# Patient Record
Sex: FEMALE | Race: BLACK OR AFRICAN AMERICAN | Employment: OTHER | URBAN - METROPOLITAN AREA
[De-identification: names, ages, dates, MRNs, and addresses within clinical notes are randomized per-mention and may not be internally consistent; named-entity substitution may affect disease eponyms.]

---

## 2017-07-27 PROBLEM — E66.01 MORBID OBESITY WITH BMI OF 40.0-44.9, ADULT (HCC): Chronic | Status: ACTIVE | Noted: 2017-07-27

## 2017-07-27 PROBLEM — I25.10 CAD (CORONARY ARTERY DISEASE): Chronic | Status: ACTIVE | Noted: 2017-07-27

## 2017-07-27 PROBLEM — K85.00 IDIOPATHIC ACUTE PANCREATITIS: Status: ACTIVE | Noted: 2017-07-27

## 2017-07-30 PROBLEM — K85.90 ACUTE PANCREATITIS WITHOUT INFECTION OR NECROSIS: Status: ACTIVE | Noted: 2017-07-27

## 2017-08-07 ENCOUNTER — OFFICE VISIT (OUTPATIENT)
Dept: CARDIOLOGY CLINIC | Age: 67
End: 2017-08-07

## 2017-08-07 VITALS
SYSTOLIC BLOOD PRESSURE: 120 MMHG | DIASTOLIC BLOOD PRESSURE: 66 MMHG | HEART RATE: 76 BPM | BODY MASS INDEX: 43.68 KG/M2 | WEIGHT: 231.2 LBS

## 2017-08-07 DIAGNOSIS — I25.83 CORONARY ARTERY DISEASE DUE TO LIPID RICH PLAQUE: Primary | ICD-10-CM

## 2017-08-07 DIAGNOSIS — I25.10 CORONARY ARTERY DISEASE DUE TO LIPID RICH PLAQUE: Primary | ICD-10-CM

## 2017-08-07 PROCEDURE — 99214 OFFICE O/P EST MOD 30 MIN: CPT | Performed by: INTERNAL MEDICINE

## 2017-08-07 RX ORDER — POTASSIUM CHLORIDE 750 MG/1
10 CAPSULE, EXTENDED RELEASE ORAL 2 TIMES DAILY
COMMUNITY
End: 2018-10-02

## 2017-08-07 RX ORDER — FUROSEMIDE 40 MG/1
20 TABLET ORAL DAILY
COMMUNITY
End: 2018-10-02

## 2017-11-13 ENCOUNTER — OFFICE VISIT (OUTPATIENT)
Dept: CARDIOLOGY CLINIC | Age: 67
End: 2017-11-13

## 2017-11-13 VITALS
SYSTOLIC BLOOD PRESSURE: 140 MMHG | WEIGHT: 228.6 LBS | HEART RATE: 64 BPM | DIASTOLIC BLOOD PRESSURE: 76 MMHG | BODY MASS INDEX: 43.19 KG/M2

## 2017-11-13 DIAGNOSIS — E78.00 PURE HYPERCHOLESTEROLEMIA: ICD-10-CM

## 2017-11-13 DIAGNOSIS — I10 ESSENTIAL HYPERTENSION: ICD-10-CM

## 2017-11-13 DIAGNOSIS — Z98.61 CORONARY ANGIOPLASTY STATUS: Primary | ICD-10-CM

## 2017-11-13 DIAGNOSIS — Z98.61 CORONARY ANGIOPLASTY STATUS: ICD-10-CM

## 2017-11-13 LAB
A/G RATIO: 1.2 (ref 1.1–2.2)
ALBUMIN SERPL-MCNC: 4.1 G/DL (ref 3.4–5)
ALP BLD-CCNC: 76 U/L (ref 40–129)
ALT SERPL-CCNC: 22 U/L (ref 10–40)
ANION GAP SERPL CALCULATED.3IONS-SCNC: 13 MMOL/L (ref 3–16)
AST SERPL-CCNC: 23 U/L (ref 15–37)
BILIRUB SERPL-MCNC: <0.2 MG/DL (ref 0–1)
BUN BLDV-MCNC: 13 MG/DL (ref 7–20)
CALCIUM SERPL-MCNC: 10.3 MG/DL (ref 8.3–10.6)
CHLORIDE BLD-SCNC: 103 MMOL/L (ref 99–110)
CHOLESTEROL, TOTAL: 266 MG/DL (ref 0–199)
CO2: 27 MMOL/L (ref 21–32)
CREAT SERPL-MCNC: 0.8 MG/DL (ref 0.6–1.2)
GFR AFRICAN AMERICAN: >60
GFR NON-AFRICAN AMERICAN: >60
GLOBULIN: 3.4 G/DL
GLUCOSE BLD-MCNC: 109 MG/DL (ref 70–99)
HDLC SERPL-MCNC: 40 MG/DL (ref 40–60)
LDL CHOLESTEROL CALCULATED: 192 MG/DL
POTASSIUM SERPL-SCNC: 4.6 MMOL/L (ref 3.5–5.1)
SODIUM BLD-SCNC: 143 MMOL/L (ref 136–145)
TOTAL PROTEIN: 7.5 G/DL (ref 6.4–8.2)
TRIGL SERPL-MCNC: 168 MG/DL (ref 0–150)
VLDLC SERPL CALC-MCNC: 34 MG/DL

## 2017-11-13 PROCEDURE — 99214 OFFICE O/P EST MOD 30 MIN: CPT | Performed by: INTERNAL MEDICINE

## 2017-11-13 NOTE — PROGRESS NOTES
[facility]   Dr Kar Mena. Evy Villarreal MD, [address] [facility]    Outpatient Follow Up Note    2017,9:51 AM  Subjective:   CHIEF COMPLAINT / HPI:  Follow Up secondary to hypertension and coronary artery disease     Luiza Ramirez is 79 y.o. female who presents today for a follow-up  This patient is doing well feeling well not having any discomfort or problems. She is planning a trip to [place] in 2018. Cecy aRmirez She feels that she is in pretty good shape and she is to go to rehab. In 2016 she has stent to the right coronary artery has done well since that time unfortunately she did have pancreatitis in 2017 in all for statins were discontinued. She apparently has significant issues with side effects from all the statins over the years including Zetia and is now not taking anything. I do believe that for cardiac purposes she needs to be on something for her cholesterol. We will get a fasting in lipid and CMP today. Think in view of her intolerance of all the statins and other medications we will need to place her on the injectable praluent or repatha. I did have a discussion with her about that.    Pancreatitis treated at [address] [place] [address] 2017  CAD with stent to the right coronary artery 2016  Failed treatment with all statins and all other cholesterol medications  Past Medical History:    Past Medical History:   Diagnosis Date    CAD (coronary artery disease)     CAD (coronary artery disease) 10/20/2016    Distal RCA 3.0 x 15 mm Alpine CRISTEL    Essential hypertension      Past Surgical History  Past Surgical History:   Procedure Laterality Date    ANGIOPLASTY      GS    APPENDECTOMY      BREAST BIOPSY       SECTION      CHOLECYSTECTOMY      HERNIA REPAIR       Social History:       History   Smoking Status    Never Smoker   Smokeless Tobacco    Never Used     Current Medications:  Prior to Visit Medications    Medication Sig Taking? Authorizing Provider   potassium chloride (MICRO-K) 10 MEQ extended release capsule Take 10 mEq by mouth 2 times daily Yes Historical Provider, MD   furosemide (LASIX) 40 MG tablet Take 20 mg by mouth daily Yes Historical Provider, MD   ondansetron (ZOFRAN) 4 MG tablet Take 1 tablet by mouth daily as needed for Nausea or Vomiting Yes Marilin Hoyos MD   hydrALAZINE (APRESOLINE) 50 MG tablet Take 1 tablet by mouth every 8 hours Yes Marilin Hoyos MD   metoprolol tartrate (LOPRESSOR) 25 MG tablet Take 1 tablet by mouth 2 times daily Yes Marilin Hoyos MD   amLODIPine (NORVASC) 5 MG tablet Take 1 tablet by mouth daily Yes Marilin Hoyos MD   ticagrelor (BRILINTA) 90 MG TABS tablet Take 1 tablet by mouth 2 times daily Yes Miky Orourke MD   pravastatin (PRAVACHOL) 20 MG tablet Take 1 tablet by mouth daily Yes Miky Orourke MD   allopurinol (ZYLOPRIM) 100 MG tablet Take 100 mg by mouth Yes Historical Provider, MD   ezetimibe (ZETIA) 10 MG tablet Take by mouth Yes Historical Provider, MD   glipiZIDE (GLUCOTROL) 5 MG tablet Take 1 tablet by mouth Yes Historical Provider, MD   sertraline (ZOLOFT) 50 MG tablet TAKE ONE AND ONE-HALF TABLETS DAILY Yes Historical Provider, MD   vitamin D (ERGOCALCIFEROL) 84623 UNITS CAPS capsule Take 1 capsule by mouth Yes Historical Provider, MD   aspirin EC 81 MG EC tablet Take 1 tablet by mouth daily Yes Anastasiya Kemp NP     Family History  History reviewed. No pertinent family history.     Current Medications  Current Outpatient Prescriptions   Medication Sig Dispense Refill    potassium chloride (MICRO-K) 10 MEQ extended release capsule Take 10 mEq by mouth 2 times daily      furosemide (LASIX) 40 MG tablet Take 20 mg by mouth daily      ondansetron (ZOFRAN) 4 MG tablet Take 1 tablet by mouth daily as needed for Nausea or Vomiting 10 tablet 0    hydrALAZINE (APRESOLINE) 50 MG tablet Take 1 tablet by mouth every 8 hours 90 tablet 3    metoprolol tartrate (LOPRESSOR) 25 MG tablet Take 1 tablet by mouth 2 times daily 60 tablet 3    amLODIPine (NORVASC) 5 MG tablet Take 1 tablet by mouth daily 30 tablet 3    ticagrelor (BRILINTA) 90 MG TABS tablet Take 1 tablet by mouth 2 times daily 180 tablet 2    pravastatin (PRAVACHOL) 20 MG tablet Take 1 tablet by mouth daily 90 tablet 2    allopurinol (ZYLOPRIM) 100 MG tablet Take 100 mg by mouth      ezetimibe (ZETIA) 10 MG tablet Take by mouth      glipiZIDE (GLUCOTROL) 5 MG tablet Take 1 tablet by mouth      sertraline (ZOLOFT) 50 MG tablet TAKE ONE AND ONE-HALF TABLETS DAILY      vitamin D (ERGOCALCIFEROL) 19933 UNITS CAPS capsule Take 1 capsule by mouth      aspirin EC 81 MG EC tablet Take 1 tablet by mouth daily 30 tablet 3     No current facility-administered medications for this visit. REVIEW OF SYSTEMS:    CONSTITUTIONAL: No major weight gain or loss, fatigue, weakness, night sweats or fever. HEENT: No new vision difficulties or ringing in the ears. RESPIRATORY: No new SOB, PND, orthopnea or cough. CARDIOVASCULAR: See HPI  GI: No nausea, vomiting, diarrhea, constipation, abdominal pain or changes in bowel habits. : No urinary frequency, urgency, incontinence hematuria or dysuria. SKIN: No cyanosis or skin lesions. MUSCULOSKELETAL: No new muscle or joint pain. NEUROLOGICAL: No syncope or TIA-like symptoms.   PSYCHIATRIC: No anxiety, pain, insomnia or depression    Objective:   PHYSICAL EXAM:        VITALS:    Wt Readings from Last 3 Encounters:   11/13/17 228 lb 9.6 oz (103.7 kg)   08/07/17 231 lb 3.2 oz (104.9 kg)   07/27/17 231 lb 4.2 oz (104.9 kg)     BP Readings from Last 3 Encounters:   11/13/17 (!) 140/76   08/07/17 120/66   07/31/17 (!) 150/78     Pulse Readings from Last 3 Encounters:   11/13/17 64   08/07/17 76   07/31/17 100       CONSTITUTIONAL: Cooperative, no apparent distress, and appears well nourished / developed  NEUROLOGIC:  Awake and orientated to person, place and time.  PSYCH: Calm affect. SKIN: Warm and dry. HEENT: Sclera non-icteric, normocephalic, neck supple, no elevation of JVP, normal carotid pulses with no bruits and thyroid normal size. LUNGS:  No increased work of breathing and clear to auscultation, no crackles or wheezing  CARDIOVASCULAR:  Regular rate and rhythm with no murmurs, gallops, rubs, or abnormal heart sounds, normal PMI. The apical impulses not displaced  Heart tones are crisp and normal  Cervical veins are not engorged  The carotid upstroke is normal in amplitude and contour without delay or bruit  JVP is not elevated  ABDOMEN:  Normal bowel sounds, non-distended and non-tender to palpation  EXT: No edema, no calf tenderness. Pulses are present bilaterally. DATA:    Lab Results   Component Value Date    ALT 18 07/30/2017    AST 24 07/30/2017    ALKPHOS 38 (L) 07/30/2017    BILITOT 0.6 07/30/2017     Lab Results   Component Value Date    CREATININE 0.8 07/30/2017    BUN 11 07/30/2017     07/30/2017    K 3.5 07/30/2017     07/30/2017    CO2 22 07/30/2017     No results found for: TSH, J2IGSEP, J9CMZBS, THYROIDAB  Lab Results   Component Value Date    WBC 11.4 (H) 07/30/2017    HGB 9.9 (L) 07/30/2017    HCT 31.2 (L) 07/30/2017    MCV 94.5 07/30/2017     07/30/2017     No components found for: CHLPL  Lab Results   Component Value Date    TRIG 129 07/27/2017    TRIG 253 (H) 10/20/2016     Lab Results   Component Value Date    HDL 35 (L) 10/20/2016     Lab Results   Component Value Date    LDLCALC 135 (H) 10/20/2016     Lab Results   Component Value Date    LABVLDL 51 10/20/2016     Radiology Review:  Pertinent images / reports were reviewed as a part of this visit and reveals the following:    Last Echo:    Last Stress Test / Angiogram: 10/4/16  The left ventricular chamber size is normal.  Normal left   ventricular wall motion on gated SPECT images.    The computer estimated rest left ventricular ejection fraction is   68% which is

## 2018-01-04 ENCOUNTER — TELEPHONE (OUTPATIENT)
Dept: CARDIOLOGY CLINIC | Age: 68
End: 2018-01-04

## 2018-01-04 NOTE — TELEPHONE ENCOUNTER
Insurance change beginning of the 2018 year. Pt's REPATHA medications need to go to:    [facility]   #[phone]. [facility] stated they have the authorization from us but not the RX for the [address]. Please call [facility] and also call pt with status.

## 2018-03-08 ENCOUNTER — OFFICE VISIT (OUTPATIENT)
Dept: CARDIOLOGY CLINIC | Age: 68
End: 2018-03-08

## 2018-03-08 VITALS
SYSTOLIC BLOOD PRESSURE: 118 MMHG | BODY MASS INDEX: 42.7 KG/M2 | HEART RATE: 72 BPM | WEIGHT: 226 LBS | DIASTOLIC BLOOD PRESSURE: 66 MMHG

## 2018-03-08 DIAGNOSIS — I25.10 CORONARY ARTERY DISEASE DUE TO LIPID RICH PLAQUE: ICD-10-CM

## 2018-03-08 DIAGNOSIS — E78.5 HYPERLIPIDEMIA LDL GOAL <70: ICD-10-CM

## 2018-03-08 DIAGNOSIS — Z98.61 CORONARY ANGIOPLASTY STATUS: ICD-10-CM

## 2018-03-08 DIAGNOSIS — I25.83 CORONARY ARTERY DISEASE DUE TO LIPID RICH PLAQUE: ICD-10-CM

## 2018-03-08 DIAGNOSIS — I10 ESSENTIAL HYPERTENSION: Primary | ICD-10-CM

## 2018-03-08 PROCEDURE — 99214 OFFICE O/P EST MOD 30 MIN: CPT | Performed by: INTERNAL MEDICINE

## 2018-03-08 NOTE — PROGRESS NOTES
[employer]   Dr Frances Sport. Jake Will MD, [address] [facility]    Outpatient Follow Up Note    3/8/2018,2:47 PM  Subjective:   [address] [facility]:  Follow Up secondary to hypertension and coronary artery disease     Chloe Nicholas is 79 y.o. female who presents today for a follow-up  She is stable from our perspective. Not having any current issues or shortness of breath or dyspnea. She did have her trip to [place] and apparently had a good time last fall. Currently she is on Repatha for about a month and a half. Her vitals are stable. She is tolerating Repatha well. She will have lipids and CMP in 1 month and return to see me in 2 months. [place] [facility] [place]  CAD with stent to the right coronary artery 2016  Failed treatment with all statins and all other cholesterol medications  Past Medical History:    Past Medical History:   Diagnosis Date    CAD (coronary artery disease)     CAD (coronary artery disease) 10/20/2016    Distal RCA 3.0 x 15 mm Alpine CRISTEL    Essential hypertension      Past Surgical History  Past Surgical History:   Procedure Laterality Date    ANGIOPLASTY      GS    APPENDECTOMY      BREAST BIOPSY       SECTION      CHOLECYSTECTOMY      HERNIA REPAIR       Social History:       History   Smoking Status    Never Smoker   Smokeless Tobacco    Never Used     Current Medications:  Prior to Visit Medications    Medication Sig Taking?  Authorizing Provider   Evolocumab with Infusor ([facility]) 420 MG/3.5ML SOCT Inject 420 mg into the skin every 30 days Yes Javier Ayers MD   potassium chloride (MICRO-K) 10 MEQ extended release capsule Take 10 mEq by mouth 2 times daily Yes Historical Provider, MD   furosemide (LASIX) 40 MG tablet Take 20 mg by mouth daily Yes Historical Provider, MD   ondansetron (ZOFRAN) 4 MG tablet Take 1 tablet by mouth daily as needed for Nausea or Vomiting Yes Bj Hahn MD   hydrALAZINE (APRESOLINE) 50 MG tablet Take 1 tablet by mouth every 8 hours Yes Bj Hahn MD   metoprolol tartrate (LOPRESSOR) 25 MG tablet Take 1 tablet by mouth 2 times daily Yes Bj Hahn MD   amLODIPine (NORVASC) 5 MG tablet Take 1 tablet by mouth daily Yes Bj Hahn MD   ticagrelor (BRILINTA) 90 MG TABS tablet Take 1 tablet by mouth 2 times daily Yes Carmel Browning MD   allopurinol (ZYLOPRIM) 100 MG tablet Take 100 mg by mouth Yes Historical Provider, MD   ezetimibe (ZETIA) 10 MG tablet Take by mouth Yes Historical Provider, MD   glipiZIDE (GLUCOTROL) 5 MG tablet Take 1 tablet by mouth Yes Historical Provider, MD   sertraline (ZOLOFT) 50 MG tablet TAKE ONE AND ONE-HALF TABLETS DAILY Yes Historical Provider, MD   vitamin D (ERGOCALCIFEROL) 10058 UNITS CAPS capsule Take 1 capsule by mouth Yes Historical Provider, MD   aspirin EC 81 MG EC tablet Take 1 tablet by mouth daily Yes Carley Angela NP     Family History  History reviewed. No pertinent family history.     Current Medications  Current Outpatient Prescriptions   Medication Sig Dispense Refill    Evolocumab with Infusor ([facility]) 420 MG/3.5ML SOCT Inject 420 mg into the skin every 30 days 1 Cartridge 12    potassium chloride (MICRO-K) 10 MEQ extended release capsule Take 10 mEq by mouth 2 times daily      furosemide (LASIX) 40 MG tablet Take 20 mg by mouth daily      ondansetron (ZOFRAN) 4 MG tablet Take 1 tablet by mouth daily as needed for Nausea or Vomiting 10 tablet 0    hydrALAZINE (APRESOLINE) 50 MG tablet Take 1 tablet by mouth every 8 hours 90 tablet 3    metoprolol tartrate (LOPRESSOR) 25 MG tablet Take 1 tablet by mouth 2 times daily 60 tablet 3    amLODIPine (NORVASC) 5 MG tablet Take 1 tablet by mouth daily 30 tablet 3    ticagrelor (BRILINTA) 90 MG TABS tablet Take 1 tablet by mouth 2 times daily 180 tablet 2    allopurinol (ZYLOPRIM) 100 MG tablet Take 100 mg by mouth  ezetimibe (ZETIA) 10 MG tablet Take by mouth      glipiZIDE (GLUCOTROL) 5 MG tablet Take 1 tablet by mouth      sertraline (ZOLOFT) 50 MG tablet TAKE ONE AND ONE-HALF TABLETS DAILY      vitamin D (ERGOCALCIFEROL) 01110 UNITS CAPS capsule Take 1 capsule by mouth      aspirin EC 81 MG EC tablet Take 1 tablet by mouth daily 30 tablet 3     No current facility-administered medications for this visit. REVIEW OF SYSTEMS:    CONSTITUTIONAL: No major weight gain or loss, fatigue, weakness, night sweats or fever. HEENT: No new vision difficulties or ringing in the ears. RESPIRATORY: No new SOB, PND, orthopnea or cough. CARDIOVASCULAR: See HPI  GI: No nausea, vomiting, diarrhea, constipation, abdominal pain or changes in bowel habits. : No urinary frequency, urgency, incontinence hematuria or dysuria. SKIN: No cyanosis or skin lesions. MUSCULOSKELETAL: No new muscle or joint pain. NEUROLOGICAL: No syncope or TIA-like symptoms. PSYCHIATRIC: No anxiety, pain, insomnia or depression    Objective:   PHYSICAL EXAM:        VITALS:    Wt Readings from Last 3 Encounters:   03/08/18 226 lb (102.5 kg)   11/13/17 228 lb 9.6 oz (103.7 kg)   08/07/17 231 lb 3.2 oz (104.9 kg)     BP Readings from Last 3 Encounters:   03/08/18 118/66   11/13/17 (!) 140/76   08/07/17 120/66     Pulse Readings from Last 3 Encounters:   03/08/18 72   11/13/17 64   08/07/17 76       CONSTITUTIONAL: Cooperative, no apparent distress, and appears well nourished / developed  NEUROLOGIC:  Awake and orientated to person, place and time. PSYCH: Calm affect. SKIN: Warm and dry. HEENT: Sclera non-icteric, normocephalic, neck supple, no elevation of JVP, normal carotid pulses with no bruits and thyroid normal size. LUNGS:  No increased work of breathing and clear to auscultation, no crackles or wheezing  CARDIOVASCULAR:  Regular rate and rhythm with no murmurs, gallops, rubs, or abnormal heart sounds, normal PMI. The apical impulses not

## 2018-03-28 DIAGNOSIS — E78.5 HYPERLIPIDEMIA LDL GOAL <70: ICD-10-CM

## 2018-03-28 DIAGNOSIS — I10 ESSENTIAL HYPERTENSION: ICD-10-CM

## 2018-03-28 DIAGNOSIS — Z98.61 CORONARY ANGIOPLASTY STATUS: ICD-10-CM

## 2018-03-28 DIAGNOSIS — I25.10 CORONARY ARTERY DISEASE DUE TO LIPID RICH PLAQUE: ICD-10-CM

## 2018-03-28 DIAGNOSIS — I25.83 CORONARY ARTERY DISEASE DUE TO LIPID RICH PLAQUE: ICD-10-CM

## 2018-03-28 LAB
A/G RATIO: 1.2 (ref 1.1–2.2)
ALBUMIN SERPL-MCNC: 3.9 G/DL (ref 3.4–5)
ALP BLD-CCNC: 77 U/L (ref 40–129)
ALT SERPL-CCNC: 24 U/L (ref 10–40)
ANION GAP SERPL CALCULATED.3IONS-SCNC: 15 MMOL/L (ref 3–16)
AST SERPL-CCNC: 25 U/L (ref 15–37)
BILIRUB SERPL-MCNC: 0.3 MG/DL (ref 0–1)
BUN BLDV-MCNC: 17 MG/DL (ref 7–20)
CALCIUM SERPL-MCNC: 9.5 MG/DL (ref 8.3–10.6)
CHLORIDE BLD-SCNC: 101 MMOL/L (ref 99–110)
CHOLESTEROL, TOTAL: 144 MG/DL (ref 0–199)
CO2: 26 MMOL/L (ref 21–32)
CREAT SERPL-MCNC: 0.8 MG/DL (ref 0.6–1.2)
GFR AFRICAN AMERICAN: >60
GFR NON-AFRICAN AMERICAN: >60
GLOBULIN: 3.2 G/DL
GLUCOSE BLD-MCNC: 91 MG/DL (ref 70–99)
HDLC SERPL-MCNC: 41 MG/DL (ref 40–60)
LDL CHOLESTEROL CALCULATED: 67 MG/DL
POTASSIUM SERPL-SCNC: 4.1 MMOL/L (ref 3.5–5.1)
SODIUM BLD-SCNC: 142 MMOL/L (ref 136–145)
TOTAL PROTEIN: 7.1 G/DL (ref 6.4–8.2)
TRIGL SERPL-MCNC: 180 MG/DL (ref 0–150)
VLDLC SERPL CALC-MCNC: 36 MG/DL

## 2018-04-06 LAB
AVERAGE GLUCOSE: NORMAL
HBA1C MFR BLD: 6.2 %

## 2018-04-12 ENCOUNTER — OFFICE VISIT (OUTPATIENT)
Dept: CARDIOLOGY CLINIC | Age: 68
End: 2018-04-12

## 2018-04-12 VITALS
SYSTOLIC BLOOD PRESSURE: 120 MMHG | DIASTOLIC BLOOD PRESSURE: 62 MMHG | WEIGHT: 226 LBS | HEART RATE: 68 BPM | BODY MASS INDEX: 42.7 KG/M2

## 2018-04-12 DIAGNOSIS — Z98.890 S/P CORONARY ANGIOGRAM: ICD-10-CM

## 2018-04-12 DIAGNOSIS — I10 ESSENTIAL HYPERTENSION: ICD-10-CM

## 2018-04-12 DIAGNOSIS — E78.00 PURE HYPERCHOLESTEROLEMIA: ICD-10-CM

## 2018-04-12 DIAGNOSIS — Z98.61 CORONARY ANGIOPLASTY STATUS: Primary | ICD-10-CM

## 2018-04-12 DIAGNOSIS — E11.9 TYPE 2 DIABETES MELLITUS WITHOUT COMPLICATION, UNSPECIFIED LONG TERM INSULIN USE STATUS: ICD-10-CM

## 2018-04-12 PROCEDURE — 99214 OFFICE O/P EST MOD 30 MIN: CPT | Performed by: NURSE PRACTITIONER

## 2018-04-13 LAB
CREATININE URINE: NORMAL MG/DL
MICROALBUMIN/CREAT 24H UR: 1000.8 MG/G{CREAT}

## 2018-08-09 ENCOUNTER — OFFICE VISIT (OUTPATIENT)
Dept: CARDIOLOGY CLINIC | Age: 68
End: 2018-08-09

## 2018-08-09 VITALS
DIASTOLIC BLOOD PRESSURE: 64 MMHG | BODY MASS INDEX: 44.02 KG/M2 | HEART RATE: 64 BPM | SYSTOLIC BLOOD PRESSURE: 124 MMHG | WEIGHT: 233 LBS

## 2018-08-09 DIAGNOSIS — E66.01 MORBID OBESITY WITH BMI OF 40.0-44.9, ADULT (HCC): Chronic | ICD-10-CM

## 2018-08-09 DIAGNOSIS — Z98.890 S/P CORONARY ANGIOGRAM: ICD-10-CM

## 2018-08-09 DIAGNOSIS — E11.9 TYPE 2 DIABETES MELLITUS WITHOUT COMPLICATION, UNSPECIFIED WHETHER LONG TERM INSULIN USE (HCC): Primary | ICD-10-CM

## 2018-08-09 DIAGNOSIS — I25.10 CORONARY ARTERY DISEASE INVOLVING NATIVE HEART WITHOUT ANGINA PECTORIS, UNSPECIFIED VESSEL OR LESION TYPE: Chronic | ICD-10-CM

## 2018-08-09 PROCEDURE — 99214 OFFICE O/P EST MOD 30 MIN: CPT | Performed by: NURSE PRACTITIONER

## 2018-08-09 NOTE — PATIENT INSTRUCTIONS
Last visit : drug holiday form Repatha /due to itching al over / today she states itching stopped   Blood work LDL was 192> 67 with Joshua Gaffney  / will do lipids and recheck her LDL's off of the Repatha consider Praluent  In future   Brilinta 90mg BID to 60 mg BID / has been>year on Brilnta 90mg BID / CRISTEL 2016   Has muscle cramps /she does take Vit.  D po / start CoQ10 daily    Fu in apporx one month with blood work  [address] [facility]

## 2018-08-09 NOTE — PROGRESS NOTES
1 tablet by mouth 2 times daily Yes Ignacio Avery MD   amLODIPine (NORVASC) 5 MG tablet Take 1 tablet by mouth daily Yes Ignacio Avery MD   ticagrelor (BRILINTA) 90 MG TABS tablet Take 1 tablet by mouth 2 times daily Yes Adrienne Chappell MD   allopurinol (ZYLOPRIM) 100 MG tablet Take 100 mg by mouth Yes Historical Provider, MD   ezetimibe (ZETIA) 10 MG tablet Take by mouth Yes Historical Provider, MD   glipiZIDE (GLUCOTROL) 5 MG tablet Take 1 tablet by mouth Yes Historical Provider, MD   sertraline (ZOLOFT) 50 MG tablet TAKE ONE AND ONE-HALF TABLETS DAILY Yes Historical Provider, MD   vitamin D (ERGOCALCIFEROL) 07149 UNITS CAPS capsule Take 1 capsule by mouth Yes Historical Provider, MD   aspirin EC 81 MG EC tablet Take 1 tablet by mouth daily Yes Pricila Ortiz APRN - CNP     Family History  History reviewed. No pertinent family history. · ROS:   · Cardiovascular: Reviewed in HPI  · Allergic/Immunologic: No nasal congestion or hives. · All other ROS are reviewed and are unremarkable. PHYSICAL EXAM:      Wt Readings from Last 3 Encounters:   08/09/18 233 lb (105.7 kg)   04/12/18 226 lb (102.5 kg)   03/08/18 226 lb (102.5 kg)       BP Readings from Last 3 Encounters:   08/09/18 124/64   04/12/18 120/62   03/08/18 118/66       Pulse Readings from Last 3 Encounters:   08/09/18 64   04/12/18 68   03/08/18 72     Exam   ENT: neg   NEUROLOGIC:  Neg   PSYCH: neg  SKIN: Warm and dry.   LUNGS:  No increased work of breathing and clear to auscultation, no crackles or wheezing  CARDIOVASCULAR: RRR  2/6 systolic murmur   ABDOMEN: soft large   EXT: no noted edema     Assessment:      CAD stent to the right coronary artery October 20, 2016  No c/o cp SOB edema / no PND   On OMT       HLD   Failed treatment with all statins and all other cholesterol medications  Today c/o itching head to toe / she feels its her Repatha / states throat felt tight one evening       c/o right hand soreness  fu with PCP with this issue      BOB   Uses CPAP      Pancreatitis  treated at [facility] July 2017      Plan:   Patient education on touch screen in room   Discussed heart heathy lifestyle including nutrition, exercise & importance of nonsmoking,         Last visit : drug holiday form Repatha /due to itching al over / today she states itching stopped   Blood work LDL was 192> 67 with Asha Santiago  / will do lipids and recheck her LDL's off of the Repatha consider Praluent  In future   Brilinta 90mg BID to 60 mg BID / has been>year on Brilnta 90mg BID / CRISTEL 2016   Has muscle cramps /she does take Vit.  D po / start CoQ10 daily    Fu in apporx one month with blood work  [address]

## 2018-08-13 DIAGNOSIS — E11.9 TYPE 2 DIABETES MELLITUS WITHOUT COMPLICATION, UNSPECIFIED WHETHER LONG TERM INSULIN USE (HCC): ICD-10-CM

## 2018-08-13 DIAGNOSIS — Z98.890 S/P CORONARY ANGIOGRAM: ICD-10-CM

## 2018-08-13 DIAGNOSIS — E66.01 MORBID OBESITY WITH BMI OF 40.0-44.9, ADULT (HCC): Chronic | ICD-10-CM

## 2018-08-13 DIAGNOSIS — I10 ESSENTIAL HYPERTENSION: ICD-10-CM

## 2018-08-13 DIAGNOSIS — E11.9 TYPE 2 DIABETES MELLITUS WITHOUT COMPLICATION (HCC): ICD-10-CM

## 2018-08-13 DIAGNOSIS — E78.00 PURE HYPERCHOLESTEROLEMIA: ICD-10-CM

## 2018-08-13 DIAGNOSIS — Z98.61 CORONARY ANGIOPLASTY STATUS: ICD-10-CM

## 2018-08-13 DIAGNOSIS — I25.10 CORONARY ARTERY DISEASE INVOLVING NATIVE HEART WITHOUT ANGINA PECTORIS, UNSPECIFIED VESSEL OR LESION TYPE: Chronic | ICD-10-CM

## 2018-08-13 LAB
A/G RATIO: 1.1 (ref 1.1–2.2)
ALBUMIN SERPL-MCNC: 4 G/DL (ref 3.4–5)
ALP BLD-CCNC: 80 U/L (ref 40–129)
ALT SERPL-CCNC: 26 U/L (ref 10–40)
ANION GAP SERPL CALCULATED.3IONS-SCNC: 13 MMOL/L (ref 3–16)
AST SERPL-CCNC: 26 U/L (ref 15–37)
BILIRUB SERPL-MCNC: 0.3 MG/DL (ref 0–1)
BILIRUBIN DIRECT: <0.2 MG/DL (ref 0–0.3)
BILIRUBIN, INDIRECT: NORMAL MG/DL (ref 0–1)
BUN BLDV-MCNC: 17 MG/DL (ref 7–20)
CALCIUM SERPL-MCNC: 10.2 MG/DL (ref 8.3–10.6)
CHLORIDE BLD-SCNC: 103 MMOL/L (ref 99–110)
CHOLESTEROL, TOTAL: 268 MG/DL (ref 0–199)
CO2: 28 MMOL/L (ref 21–32)
CREAT SERPL-MCNC: 0.9 MG/DL (ref 0.6–1.2)
FOLATE: 10.03 NG/ML (ref 4.78–24.2)
GFR AFRICAN AMERICAN: >60
GFR NON-AFRICAN AMERICAN: >60
GLOBULIN: 3.5 G/DL
GLUCOSE BLD-MCNC: 107 MG/DL (ref 70–99)
HCT VFR BLD CALC: 38.4 % (ref 36–48)
HDLC SERPL-MCNC: 36 MG/DL (ref 40–60)
HEMOGLOBIN: 12.6 G/DL (ref 12–16)
LDL CHOLESTEROL CALCULATED: 188 MG/DL
MAGNESIUM: 1.4 MG/DL (ref 1.8–2.4)
MCH RBC QN AUTO: 31 PG (ref 26–34)
MCHC RBC AUTO-ENTMCNC: 32.9 G/DL (ref 31–36)
MCV RBC AUTO: 94.1 FL (ref 80–100)
PDW BLD-RTO: 14 % (ref 12.4–15.4)
PLATELET # BLD: 275 K/UL (ref 135–450)
PMV BLD AUTO: 8.3 FL (ref 5–10.5)
POTASSIUM SERPL-SCNC: 4.5 MMOL/L (ref 3.5–5.1)
RBC # BLD: 4.08 M/UL (ref 4–5.2)
SODIUM BLD-SCNC: 144 MMOL/L (ref 136–145)
TOTAL PROTEIN: 7.5 G/DL (ref 6.4–8.2)
TRIGL SERPL-MCNC: 220 MG/DL (ref 0–150)
VITAMIN B-12: 1073 PG/ML (ref 211–911)
VLDLC SERPL CALC-MCNC: 44 MG/DL
WBC # BLD: 6.6 K/UL (ref 4–11)

## 2018-08-15 LAB — VITAMIN D 1,25-DIHYDROXY: 27 PG/ML (ref 19.9–79.3)

## 2018-10-02 ENCOUNTER — OFFICE VISIT (OUTPATIENT)
Dept: CARDIOLOGY CLINIC | Age: 68
End: 2018-10-02
Payer: MEDICARE

## 2018-10-02 VITALS
BODY MASS INDEX: 45.82 KG/M2 | WEIGHT: 233.4 LBS | OXYGEN SATURATION: 92 % | SYSTOLIC BLOOD PRESSURE: 134 MMHG | HEART RATE: 85 BPM | DIASTOLIC BLOOD PRESSURE: 76 MMHG | HEIGHT: 60 IN

## 2018-10-02 DIAGNOSIS — E78.00 PURE HYPERCHOLESTEROLEMIA: ICD-10-CM

## 2018-10-02 DIAGNOSIS — I10 ESSENTIAL HYPERTENSION: Primary | ICD-10-CM

## 2018-10-02 PROCEDURE — 99214 OFFICE O/P EST MOD 30 MIN: CPT | Performed by: INTERNAL MEDICINE

## 2018-10-02 PROCEDURE — 93000 ELECTROCARDIOGRAM COMPLETE: CPT | Performed by: INTERNAL MEDICINE

## 2018-10-02 RX ORDER — MAGNESIUM OXIDE 400 MG/1
400 TABLET ORAL DAILY
Qty: 30 TABLET | Refills: 3 | Status: SHIPPED | OUTPATIENT
Start: 2018-10-02 | End: 2018-12-26 | Stop reason: SDUPTHER

## 2018-10-04 DIAGNOSIS — E78.00 PURE HYPERCHOLESTEROLEMIA: ICD-10-CM

## 2018-10-04 DIAGNOSIS — I10 ESSENTIAL HYPERTENSION: ICD-10-CM

## 2018-10-04 LAB
A/G RATIO: 1.3 (ref 1.1–2.2)
ALBUMIN SERPL-MCNC: 4.2 G/DL (ref 3.4–5)
ALP BLD-CCNC: 83 U/L (ref 40–129)
ALT SERPL-CCNC: 24 U/L (ref 10–40)
ANION GAP SERPL CALCULATED.3IONS-SCNC: 15 MMOL/L (ref 3–16)
AST SERPL-CCNC: 28 U/L (ref 15–37)
BILIRUB SERPL-MCNC: 0.4 MG/DL (ref 0–1)
BUN BLDV-MCNC: 15 MG/DL (ref 7–20)
CALCIUM SERPL-MCNC: 10.4 MG/DL (ref 8.3–10.6)
CHLORIDE BLD-SCNC: 103 MMOL/L (ref 99–110)
CHOLESTEROL, TOTAL: 277 MG/DL (ref 0–199)
CO2: 25 MMOL/L (ref 21–32)
CREAT SERPL-MCNC: 0.9 MG/DL (ref 0.6–1.2)
GFR AFRICAN AMERICAN: >60
GFR NON-AFRICAN AMERICAN: >60
GLOBULIN: 3.2 G/DL
GLUCOSE BLD-MCNC: 95 MG/DL (ref 70–99)
HDLC SERPL-MCNC: 40 MG/DL (ref 40–60)
LDL CHOLESTEROL CALCULATED: 195 MG/DL
POTASSIUM SERPL-SCNC: 4.4 MMOL/L (ref 3.5–5.1)
SODIUM BLD-SCNC: 143 MMOL/L (ref 136–145)
TOTAL PROTEIN: 7.4 G/DL (ref 6.4–8.2)
TRIGL SERPL-MCNC: 208 MG/DL (ref 0–150)
VLDLC SERPL CALC-MCNC: 42 MG/DL

## 2018-10-08 ENCOUNTER — TELEPHONE (OUTPATIENT)
Dept: CARDIOLOGY CLINIC | Age: 68
End: 2018-10-08

## 2018-12-26 RX ORDER — MAGNESIUM OXIDE 400 MG/1
400 TABLET ORAL DAILY
Qty: 30 TABLET | Refills: 3 | Status: SHIPPED | OUTPATIENT
Start: 2018-12-26 | End: 2020-11-18 | Stop reason: CLARIF

## 2019-01-16 ENCOUNTER — OFFICE VISIT (OUTPATIENT)
Dept: CARDIOLOGY CLINIC | Age: 69
End: 2019-01-16
Payer: MEDICARE

## 2019-01-16 VITALS
WEIGHT: 232.2 LBS | HEART RATE: 71 BPM | SYSTOLIC BLOOD PRESSURE: 120 MMHG | DIASTOLIC BLOOD PRESSURE: 80 MMHG | BODY MASS INDEX: 45.35 KG/M2

## 2019-01-16 DIAGNOSIS — E78.00 PURE HYPERCHOLESTEROLEMIA: ICD-10-CM

## 2019-01-16 DIAGNOSIS — Z98.61 CORONARY ANGIOPLASTY STATUS: ICD-10-CM

## 2019-01-16 DIAGNOSIS — E11.9 TYPE 2 DIABETES MELLITUS WITHOUT COMPLICATION, UNSPECIFIED WHETHER LONG TERM INSULIN USE (HCC): ICD-10-CM

## 2019-01-16 DIAGNOSIS — I10 ESSENTIAL HYPERTENSION: ICD-10-CM

## 2019-01-16 DIAGNOSIS — Z98.890 S/P CORONARY ANGIOGRAM: ICD-10-CM

## 2019-01-16 DIAGNOSIS — E66.01 MORBID OBESITY WITH BMI OF 40.0-44.9, ADULT (HCC): Chronic | ICD-10-CM

## 2019-01-16 DIAGNOSIS — Z98.61 CORONARY ANGIOPLASTY STATUS: Primary | ICD-10-CM

## 2019-01-16 LAB
A/G RATIO: 1.3 (ref 1.1–2.2)
ALBUMIN SERPL-MCNC: 4.3 G/DL (ref 3.4–5)
ALP BLD-CCNC: 84 U/L (ref 40–129)
ALT SERPL-CCNC: 25 U/L (ref 10–40)
ANION GAP SERPL CALCULATED.3IONS-SCNC: 17 MMOL/L (ref 3–16)
AST SERPL-CCNC: 25 U/L (ref 15–37)
BILIRUB SERPL-MCNC: 0.3 MG/DL (ref 0–1)
BILIRUBIN DIRECT: <0.2 MG/DL (ref 0–0.3)
BILIRUBIN, INDIRECT: NORMAL MG/DL (ref 0–1)
BUN BLDV-MCNC: 18 MG/DL (ref 7–20)
CALCIUM SERPL-MCNC: 9.9 MG/DL (ref 8.3–10.6)
CHLORIDE BLD-SCNC: 102 MMOL/L (ref 99–110)
CHOLESTEROL, TOTAL: 154 MG/DL (ref 0–199)
CO2: 23 MMOL/L (ref 21–32)
CREAT SERPL-MCNC: 0.8 MG/DL (ref 0.6–1.2)
GFR AFRICAN AMERICAN: >60
GFR NON-AFRICAN AMERICAN: >60
GLOBULIN: 3.4 G/DL
GLUCOSE BLD-MCNC: 100 MG/DL (ref 70–99)
HCT VFR BLD CALC: 38.1 % (ref 36–48)
HDLC SERPL-MCNC: 40 MG/DL (ref 40–60)
HEMOGLOBIN: 12.5 G/DL (ref 12–16)
LDL CHOLESTEROL CALCULATED: 86 MG/DL
MAGNESIUM: 1.5 MG/DL (ref 1.8–2.4)
MCH RBC QN AUTO: 31.1 PG (ref 26–34)
MCHC RBC AUTO-ENTMCNC: 32.9 G/DL (ref 31–36)
MCV RBC AUTO: 94.6 FL (ref 80–100)
PDW BLD-RTO: 14 % (ref 12.4–15.4)
PLATELET # BLD: 260 K/UL (ref 135–450)
PMV BLD AUTO: 8.4 FL (ref 5–10.5)
POTASSIUM SERPL-SCNC: 4.3 MMOL/L (ref 3.5–5.1)
RBC # BLD: 4.03 M/UL (ref 4–5.2)
SODIUM BLD-SCNC: 142 MMOL/L (ref 136–145)
TOTAL PROTEIN: 7.7 G/DL (ref 6.4–8.2)
TRIGL SERPL-MCNC: 138 MG/DL (ref 0–150)
TSH REFLEX FT4: 3.22 UIU/ML (ref 0.27–4.2)
VLDLC SERPL CALC-MCNC: 28 MG/DL
WBC # BLD: 5.7 K/UL (ref 4–11)

## 2019-01-16 PROCEDURE — 99214 OFFICE O/P EST MOD 30 MIN: CPT | Performed by: NURSE PRACTITIONER

## 2019-01-17 ENCOUNTER — NURSE ONLY (OUTPATIENT)
Dept: CARDIOLOGY | Age: 69
End: 2019-01-17

## 2019-01-17 DIAGNOSIS — I25.10 CVD (CARDIOVASCULAR DISEASE): Primary | ICD-10-CM

## 2019-01-19 LAB — VITAMIN D 1,25-DIHYDROXY: 48.2 PG/ML (ref 19.9–79.3)

## 2019-07-17 ENCOUNTER — OFFICE VISIT (OUTPATIENT)
Dept: CARDIOLOGY CLINIC | Age: 69
End: 2019-07-17
Payer: MEDICARE

## 2019-07-17 VITALS
SYSTOLIC BLOOD PRESSURE: 138 MMHG | HEART RATE: 68 BPM | DIASTOLIC BLOOD PRESSURE: 74 MMHG | WEIGHT: 234 LBS | BODY MASS INDEX: 45.7 KG/M2

## 2019-07-17 DIAGNOSIS — E78.00 PURE HYPERCHOLESTEROLEMIA: ICD-10-CM

## 2019-07-17 DIAGNOSIS — Z98.61 CORONARY ANGIOPLASTY STATUS: ICD-10-CM

## 2019-07-17 DIAGNOSIS — Z98.61 CORONARY ANGIOPLASTY STATUS: Primary | ICD-10-CM

## 2019-07-17 LAB
A/G RATIO: 1.3 (ref 1.1–2.2)
ALBUMIN SERPL-MCNC: 4.2 G/DL (ref 3.4–5)
ALP BLD-CCNC: 80 U/L (ref 40–129)
ALT SERPL-CCNC: 34 U/L (ref 10–40)
ANION GAP SERPL CALCULATED.3IONS-SCNC: 12 MMOL/L (ref 3–16)
AST SERPL-CCNC: 21 U/L (ref 15–37)
BILIRUB SERPL-MCNC: <0.2 MG/DL (ref 0–1)
BUN BLDV-MCNC: 27 MG/DL (ref 7–20)
CALCIUM SERPL-MCNC: 10.1 MG/DL (ref 8.3–10.6)
CHLORIDE BLD-SCNC: 105 MMOL/L (ref 99–110)
CHOLESTEROL, TOTAL: 154 MG/DL (ref 0–199)
CO2: 28 MMOL/L (ref 21–32)
CREAT SERPL-MCNC: 1 MG/DL (ref 0.6–1.2)
GFR AFRICAN AMERICAN: >60
GFR NON-AFRICAN AMERICAN: 55
GLOBULIN: 3.3 G/DL
GLUCOSE BLD-MCNC: 98 MG/DL (ref 70–99)
HCT VFR BLD CALC: 40.9 % (ref 36–48)
HDLC SERPL-MCNC: 65 MG/DL (ref 40–60)
HEMOGLOBIN: 13.1 G/DL (ref 12–16)
LDL CHOLESTEROL CALCULATED: 69 MG/DL
MCH RBC QN AUTO: 30.8 PG (ref 26–34)
MCHC RBC AUTO-ENTMCNC: 32.1 G/DL (ref 31–36)
MCV RBC AUTO: 96.2 FL (ref 80–100)
PDW BLD-RTO: 14.4 % (ref 12.4–15.4)
PLATELET # BLD: 328 K/UL (ref 135–450)
PMV BLD AUTO: 8.2 FL (ref 5–10.5)
POTASSIUM SERPL-SCNC: 4.8 MMOL/L (ref 3.5–5.1)
RBC # BLD: 4.25 M/UL (ref 4–5.2)
SODIUM BLD-SCNC: 145 MMOL/L (ref 136–145)
TOTAL PROTEIN: 7.5 G/DL (ref 6.4–8.2)
TRIGL SERPL-MCNC: 102 MG/DL (ref 0–150)
VLDLC SERPL CALC-MCNC: 20 MG/DL
WBC # BLD: 8.7 K/UL (ref 4–11)

## 2019-07-17 PROCEDURE — 99214 OFFICE O/P EST MOD 30 MIN: CPT | Performed by: INTERNAL MEDICINE

## 2019-07-17 NOTE — PROGRESS NOTES
[address]   Dr Devonna Collet. Thea Vazquez MD, [address] [facility]    Outpatient Follow Up Note    2019,10:40 AM  Subjective:   CHIEF COMPLAINT / HPI:  Follow Up secondary to hyperlipidemia     Arlene Avery is 71 y.o. female who presents today for a follow-up on her heart disease. She does have obstructive sleep apnea and uses CPAP and she is doing well with that. She is on Repatha and her LDLs have come down dramatically. She did have a bruise on her left breast for unknown reason. She is concerning whether Repatha may be the cause. She has a slight irritation at the injection site on her thighs. No fever no chills. She has known CAD with previous coronary artery disease. CAD with previous angioplasty stents. Hyperlipidemia on Repatha  Past Medical History:    Past Medical History:   Diagnosis Date    CAD (coronary artery disease)     CAD (coronary artery disease) 10/20/2016    Distal RCA 3.0 x 15 mm Alpine CRISTEL    Essential hypertension      Past Surgical History  Past Surgical History:   Procedure Laterality Date    ANGIOPLASTY      GS    APPENDECTOMY      BREAST BIOPSY       SECTION      CHOLECYSTECTOMY      HERNIA REPAIR       Social History:       Social History     Tobacco Use   Smoking Status Never Smoker   Smokeless Tobacco Never Used     Current Medications:  Prior to Visit Medications    Medication Sig Taking?  Authorizing Provider   Evolocumab 140 MG/ML SOAJ Inject 1 Syringe into the skin every 14 days Yes ALETA Sweeney CNP   magnesium oxide (MAG-OX) 400 MG tablet Take 1 tablet by mouth daily Yes ALETA Sweeney CNP   ticagrelor (BRILINTA) 60 MG TABS tablet Take 1 tablet by mouth 2 times daily Yes ALETA Sweeney CNP   hydrALAZINE (APRESOLINE) 50 MG tablet Take 1 tablet by mouth every 8 hours Yes Dean Hull MD   metoprolol tartrate (LOPRESSOR) 25 MG tablet Take 1 tablet by mouth 2 times daily Yes Dean Hull MD Value Date    WBC 5.7 01/16/2019    HGB 12.5 01/16/2019    HCT 38.1 01/16/2019    MCV 94.6 01/16/2019     01/16/2019     No components found for: CHLPL  Lab Results   Component Value Date    TRIG 138 01/16/2019    TRIG 208 (H) 10/04/2018    TRIG 220 (H) 08/13/2018     Lab Results   Component Value Date    HDL 40 01/16/2019    HDL 40 10/04/2018    HDL 36 (L) 08/13/2018     Lab Results   Component Value Date    LDLCALC 86 01/16/2019    LDLCALC 195 (H) 10/04/2018    LDLCALC 188 (H) 08/13/2018     Lab Results   Component Value Date    LABVLDL 28 01/16/2019    LABVLDL 42 10/04/2018    LABVLDL 44 08/13/2018     Radiology Review:  Pertinent images / reports were reviewed as a part of this visit and reveals the following:    Last Echo:    Last Stress Test / Angiogram:    Last ECG:  This patient was educated using the patient point room wall mount device. Absence from smokers and smoking and diet and exercising are important. Assessment:   CAD and post PTCA stents. Hyperlipidemia on Repatha    Plan: Will get lipids and CMP and CBC to check for coagulability. I do not think Enoch Garcia is the cause for her bruise on the left breast.    Please call if we can assist further [phone]. Devon Martinez.  [place] T[place], [facility]      This note was likely completed using voice recognition technology and may contain unintended errors

## 2019-08-16 RX ORDER — TICAGRELOR 60 MG/1
TABLET ORAL
Qty: 180 TABLET | Refills: 3 | Status: SHIPPED | OUTPATIENT
Start: 2019-08-16 | End: 2020-08-10

## 2019-10-17 RX ORDER — EVOLOCUMAB 140 MG/ML
INJECTION, SOLUTION SUBCUTANEOUS
Qty: 2 PEN | Refills: 5 | Status: SHIPPED | OUTPATIENT
Start: 2019-10-17 | End: 2021-03-11 | Stop reason: SDUPTHER

## 2020-02-14 ENCOUNTER — OFFICE VISIT (OUTPATIENT)
Dept: CARDIOLOGY CLINIC | Age: 70
End: 2020-02-14
Payer: MEDICARE

## 2020-02-14 VITALS
DIASTOLIC BLOOD PRESSURE: 62 MMHG | HEART RATE: 62 BPM | SYSTOLIC BLOOD PRESSURE: 130 MMHG | BODY MASS INDEX: 45.5 KG/M2 | WEIGHT: 233 LBS

## 2020-02-14 LAB
CHOLESTEROL, TOTAL: 152 MG/DL (ref 0–199)
HCT VFR BLD CALC: 41.4 % (ref 36–48)
HDLC SERPL-MCNC: 41 MG/DL (ref 40–60)
HEMOGLOBIN: 13.5 G/DL (ref 12–16)
LDL CHOLESTEROL CALCULATED: 73 MG/DL
MAGNESIUM: 1.7 MG/DL (ref 1.8–2.4)
MCH RBC QN AUTO: 31.1 PG (ref 26–34)
MCHC RBC AUTO-ENTMCNC: 32.5 G/DL (ref 31–36)
MCV RBC AUTO: 95.7 FL (ref 80–100)
PDW BLD-RTO: 13.9 % (ref 12.4–15.4)
PLATELET # BLD: 312 K/UL (ref 135–450)
PMV BLD AUTO: 8.7 FL (ref 5–10.5)
RBC # BLD: 4.33 M/UL (ref 4–5.2)
TRIGL SERPL-MCNC: 192 MG/DL (ref 0–150)
VLDLC SERPL CALC-MCNC: 38 MG/DL
WBC # BLD: 7.8 K/UL (ref 4–11)

## 2020-02-14 PROCEDURE — 99214 OFFICE O/P EST MOD 30 MIN: CPT | Performed by: INTERNAL MEDICINE

## 2020-02-14 NOTE — PROGRESS NOTES
[facility]   Dr Calero Dry. Lane Mueller MD, [address] [facility]    Outpatient Follow Up Note    ,00:33 AM  Subjective:   CHIEF COMPLAINT / HPI:  Follow Up secondary to hyperlipidemia     Marco A Tracy is 71 y.o. female who presents today for follow-up for cardiac disease hypertension. She has hyperlipidemia. She is doing great feels fine and her numbers are looking better. She states that she itches on occasion and she feels that [address] [place] [address] might be the cause. There is no localized area and as I discussed with her it sounds like it could not be from the [address] [place] [address]. From our perspective she otherwise looks stable blood pressure is stable. CAD with previous angioplasty stents. Hyperlipidemia on Repatha  Past Medical History:    Past Medical History:   Diagnosis Date    CAD (coronary artery disease)     CAD (coronary artery disease) 10/20/2016    Distal RCA 3.0 x 15 mm Alpine CRISTEL    Essential hypertension      Past Surgical History  Past Surgical History:   Procedure Laterality Date    ANGIOPLASTY      GS    APPENDECTOMY      BREAST BIOPSY       SECTION      CHOLECYSTECTOMY      HERNIA REPAIR       Social History:       Social History     Tobacco Use   Smoking Status Never Smoker   Smokeless Tobacco Never Used     Current Medications:  Prior to Visit Medications    Medication Sig Taking?  Authorizing Provider   Brian Bender 920 MG/ML SOAJ INJECT CONTENTS OF 1 PEN SUBCUTANEOUSLY EVERY 14 DAYS Yes ALETA Hung CNP   BRILINTA 60 MG TABS tablet TAKE 1 TABLET TWICE A DAY Yes Jacqui Montano MD   magnesium oxide (MAG-OX) 400 MG tablet Take 1 tablet by mouth daily Yes ALETA Hung CNP   hydrALAZINE (APRESOLINE) 50 MG tablet Take 1 tablet by mouth every 8 hours Yes Resa Cockayne, MD   metoprolol tartrate (LOPRESSOR) 25 MG tablet Take 1 tablet by mouth 2 times daily Yes Resa Cockayne, MD   amLODIPine (NORVASC) 5 MG tablet Take 1 tablet by frequency, urgency, incontinence hematuria or dysuria. SKIN: No cyanosis or skin lesions. MUSCULOSKELETAL: No new muscle or joint pain. NEUROLOGICAL: No syncope or TIA-like symptoms. PSYCHIATRIC: No anxiety, pain, insomnia or depression    Objective:   PHYSICAL EXAM:        VITALS:    Wt Readings from Last 3 Encounters:   02/14/20 233 lb (105.7 kg)   07/17/19 234 lb (106.1 kg)   01/16/19 232 lb 3.2 oz (105.3 kg)     BP Readings from Last 3 Encounters:   02/14/20 130/62   07/17/19 138/74   01/16/19 120/80     Pulse Readings from Last 3 Encounters:   02/14/20 62   07/17/19 68   01/16/19 71       CONSTITUTIONAL: Cooperative, no apparent distress, and appears well nourished / developed  NEUROLOGIC:  Awake and orientated to person, place and time. PSYCH: Calm affect. SKIN: Warm and dry. HEENT: Sclera non-icteric, normocephalic, neck supple, no elevation of JVP, normal carotid pulses with no bruits and thyroid normal size. LUNGS:  No increased work of breathing and clear to auscultation, no crackles or wheezing  CARDIOVASCULAR:  Regular rate and rhythm with no murmurs, gallops, rubs, or abnormal heart sounds, normal PMI. The apical impulses not displaced  Heart tones are crisp and normal  Cervical veins are not engorged  The carotid upstroke is normal in amplitude and contour without delay or bruit  JVP is not elevated  ABDOMEN:  Normal bowel sounds, non-distended and non-tender to palpation  EXT: No edema, no calf tenderness. Pulses are present bilaterally.     DATA:    Lab Results   Component Value Date    ALT 34 07/17/2019    AST 21 07/17/2019    ALKPHOS 80 07/17/2019    BILITOT <0.2 07/17/2019     Lab Results   Component Value Date    CREATININE 1.0 07/17/2019    BUN 27 (H) 07/17/2019     07/17/2019    K 4.8 07/17/2019     07/17/2019    CO2 28 07/17/2019     No results found for: TSH, U8QUPPQ, I0HCXRA, THYROIDAB  Lab Results   Component Value Date    WBC 8.7 07/17/2019    HGB 13.1 07/17/2019    HCT

## 2020-02-18 ENCOUNTER — TELEPHONE (OUTPATIENT)
Dept: CARDIOLOGY CLINIC | Age: 70
End: 2020-02-18

## 2020-06-24 ENCOUNTER — OFFICE VISIT (OUTPATIENT)
Dept: CARDIOLOGY CLINIC | Age: 70
End: 2020-06-24
Payer: MEDICARE

## 2020-06-24 VITALS
SYSTOLIC BLOOD PRESSURE: 124 MMHG | HEART RATE: 60 BPM | DIASTOLIC BLOOD PRESSURE: 84 MMHG | BODY MASS INDEX: 45.9 KG/M2 | TEMPERATURE: 97.3 F | WEIGHT: 235 LBS

## 2020-06-24 PROCEDURE — 93000 ELECTROCARDIOGRAM COMPLETE: CPT | Performed by: INTERNAL MEDICINE

## 2020-06-24 PROCEDURE — 99214 OFFICE O/P EST MOD 30 MIN: CPT | Performed by: INTERNAL MEDICINE

## 2020-06-24 NOTE — PROGRESS NOTES
[facility], [place], operated by [facility]   Dr Kassy Smith. Kita Schuster MD, [address] [facility]    Outpatient Follow Up Note    2020,11:42 AM  Subjective:   CHIEF COMPLAINT / HPI:  Follow Up secondary to hyperlipidemia and hypertension and coronary disease     Win Giraldo is 79 y.o. female who presents today she is very stable having no symptoms of shortness of breath or dyspnea. She is actively moving about. She has gained about 2 pounds of [place] fat during this pandemic area. Last LDL was 66 on Repatha. She does have intolerance to statins specifically Lipitor Crestor and pravastatin. Examination is unremarkable except for the weight. CAD with previous angioplasty stents. Hyperlipidemia on Repatha  Intolerant statins lipitor and crestor and pravastatin  Past Medical History:    Past Medical History:   Diagnosis Date    CAD (coronary artery disease)     CAD (coronary artery disease) 10/20/2016    Distal RCA 3.0 x 15 mm Alpine CRISTEL    Essential hypertension      Past Surgical History  Past Surgical History:   Procedure Laterality Date    ANGIOPLASTY      GS    APPENDECTOMY      BREAST BIOPSY       SECTION      CHOLECYSTECTOMY      HERNIA REPAIR       Social History:       Social History     Tobacco Use   Smoking Status Never Smoker   Smokeless Tobacco Never Used     Current Medications:  Prior to Visit Medications    Medication Sig Taking?  Authorizing Provider   Nargis Garsia 455 MG/ML SOAJ INJECT CONTENTS OF 1 PEN SUBCUTANEOUSLY EVERY 14 DAYS Yes ALETA Sparks CNP   BRILINTA 60 MG TABS tablet TAKE 1 TABLET TWICE A DAY Yes Kasey Alaniz MD   magnesium oxide (MAG-OX) 400 MG tablet Take 1 tablet by mouth daily Yes ALETA Sparks CNP   hydrALAZINE (APRESOLINE) 50 MG tablet Take 1 tablet by mouth every 8 hours Yes Maribel Douglas MD   metoprolol tartrate (LOPRESSOR) 25 MG tablet Take 1 tablet by mouth 2 times daily Yes Maribel Douglas MD   amLODIPine ([address]) 5

## 2020-08-10 RX ORDER — TICAGRELOR 60 MG/1
TABLET ORAL
Qty: 180 TABLET | Refills: 1 | Status: SHIPPED | OUTPATIENT
Start: 2020-08-10 | End: 2021-03-03

## 2020-11-18 ENCOUNTER — HOSPITAL ENCOUNTER (INPATIENT)
Age: 70
LOS: 6 days | Discharge: HOME | DRG: 178 | End: 2020-11-24
Attending: EMERGENCY MEDICINE | Admitting: INTERNAL MEDICINE
Payer: MEDICARE

## 2020-11-18 ENCOUNTER — APPOINTMENT (OUTPATIENT)
Dept: GENERAL RADIOLOGY | Age: 70
DRG: 178 | End: 2020-11-18
Payer: MEDICARE

## 2020-11-18 PROBLEM — U07.1 COVID-19: Status: ACTIVE | Noted: 2020-11-18

## 2020-11-18 LAB
AMORPHOUS: ABNORMAL /HPF
ANION GAP SERPL CALCULATED.3IONS-SCNC: 17 MMOL/L (ref 3–16)
BACTERIA: ABNORMAL /HPF
BASOPHILS ABSOLUTE: 0 K/UL (ref 0–0.2)
BASOPHILS RELATIVE PERCENT: 0 %
BILIRUBIN URINE: NEGATIVE
BLOOD, URINE: ABNORMAL
BUN BLDV-MCNC: 58 MG/DL (ref 7–20)
CALCIUM SERPL-MCNC: 9.5 MG/DL (ref 8.3–10.6)
CHLORIDE BLD-SCNC: 103 MMOL/L (ref 99–110)
CHLORIDE URINE RANDOM: <20 MMOL/L
CLARITY: ABNORMAL
CO2: 15 MMOL/L (ref 21–32)
COLOR: YELLOW
CREAT SERPL-MCNC: 4.8 MG/DL (ref 0.6–1.2)
CREATININE URINE: 182.5 MG/DL (ref 28–259)
EKG ATRIAL RATE: 91 BPM
EKG DIAGNOSIS: NORMAL
EKG P AXIS: 34 DEGREES
EKG P-R INTERVAL: 164 MS
EKG Q-T INTERVAL: 362 MS
EKG QRS DURATION: 80 MS
EKG QTC CALCULATION (BAZETT): 445 MS
EKG R AXIS: -8 DEGREES
EKG T AXIS: 9 DEGREES
EKG VENTRICULAR RATE: 91 BPM
EOSINOPHILS ABSOLUTE: 0 K/UL (ref 0–0.6)
EOSINOPHILS RELATIVE PERCENT: 0 %
EPITHELIAL CELLS, UA: ABNORMAL /HPF (ref 0–5)
GFR AFRICAN AMERICAN: 11
GFR NON-AFRICAN AMERICAN: 9
GLUCOSE BLD-MCNC: 113 MG/DL (ref 70–99)
GLUCOSE BLD-MCNC: 73 MG/DL (ref 70–99)
GLUCOSE BLD-MCNC: 84 MG/DL (ref 70–99)
GLUCOSE BLD-MCNC: 94 MG/DL (ref 70–99)
GLUCOSE URINE: NEGATIVE MG/DL
HCT VFR BLD CALC: 38.3 % (ref 36–48)
HEMOGLOBIN: 12.6 G/DL (ref 12–16)
KETONES, URINE: NEGATIVE MG/DL
LEUKOCYTE ESTERASE, URINE: NEGATIVE
LYMPHOCYTES ABSOLUTE: 1.5 K/UL (ref 1–5.1)
LYMPHOCYTES RELATIVE PERCENT: 13 %
MCH RBC QN AUTO: 30.9 PG (ref 26–34)
MCHC RBC AUTO-ENTMCNC: 33 G/DL (ref 31–36)
MCV RBC AUTO: 93.7 FL (ref 80–100)
MICROSCOPIC EXAMINATION: YES
MONOCYTES ABSOLUTE: 1.4 K/UL (ref 0–1.3)
MONOCYTES RELATIVE PERCENT: 12 %
NEUTROPHILS ABSOLUTE: 8.5 K/UL (ref 1.7–7.7)
NEUTROPHILS RELATIVE PERCENT: 75 %
NITRITE, URINE: NEGATIVE
PDW BLD-RTO: 14.7 % (ref 12.4–15.4)
PERFORMED ON: ABNORMAL
PERFORMED ON: NORMAL
PERFORMED ON: NORMAL
PH UA: 6 (ref 5–8)
PLATELET # BLD: 379 K/UL (ref 135–450)
PMV BLD AUTO: 7.9 FL (ref 5–10.5)
POTASSIUM REFLEX MAGNESIUM: 4.9 MMOL/L (ref 3.5–5.1)
POTASSIUM, UR: 17.2 MMOL/L
PRO-BNP: 222 PG/ML (ref 0–124)
PROTEIN PROTEIN: 173.7 MG/DL
PROTEIN UA: 100 MG/DL
PROTEIN/CREAT RATIO: 1 MG/DL
RBC # BLD: 4.09 M/UL (ref 4–5.2)
RBC UA: ABNORMAL /HPF (ref 0–4)
SODIUM BLD-SCNC: 135 MMOL/L (ref 136–145)
SODIUM URINE: 35 MMOL/L
SPECIFIC GRAVITY UA: 1.02 (ref 1–1.03)
TROPONIN: 0.05 NG/ML
TROPONIN: 0.06 NG/ML
TROPONIN: 0.06 NG/ML
URIC ACID, SERUM: 7.4 MG/DL (ref 2.6–6)
URINE TYPE: ABNORMAL
UROBILINOGEN, URINE: 0.2 E.U./DL
WBC # BLD: 11.3 K/UL (ref 4–11)
WBC UA: ABNORMAL /HPF (ref 0–5)

## 2020-11-18 PROCEDURE — 84300 ASSAY OF URINE SODIUM: CPT

## 2020-11-18 PROCEDURE — 6370000000 HC RX 637 (ALT 250 FOR IP): Performed by: STUDENT IN AN ORGANIZED HEALTH CARE EDUCATION/TRAINING PROGRAM

## 2020-11-18 PROCEDURE — 80048 BASIC METABOLIC PNL TOTAL CA: CPT

## 2020-11-18 PROCEDURE — 84133 ASSAY OF URINE POTASSIUM: CPT

## 2020-11-18 PROCEDURE — 2060000000 HC ICU INTERMEDIATE R&B

## 2020-11-18 PROCEDURE — 83036 HEMOGLOBIN GLYCOSYLATED A1C: CPT

## 2020-11-18 PROCEDURE — 6370000000 HC RX 637 (ALT 250 FOR IP): Performed by: INTERNAL MEDICINE

## 2020-11-18 PROCEDURE — 99284 EMERGENCY DEPT VISIT MOD MDM: CPT

## 2020-11-18 PROCEDURE — 82436 ASSAY OF URINE CHLORIDE: CPT

## 2020-11-18 PROCEDURE — 93005 ELECTROCARDIOGRAM TRACING: CPT | Performed by: EMERGENCY MEDICINE

## 2020-11-18 PROCEDURE — 2580000003 HC RX 258: Performed by: INTERNAL MEDICINE

## 2020-11-18 PROCEDURE — 2500000003 HC RX 250 WO HCPCS: Performed by: INTERNAL MEDICINE

## 2020-11-18 PROCEDURE — 81001 URINALYSIS AUTO W/SCOPE: CPT

## 2020-11-18 PROCEDURE — 71045 X-RAY EXAM CHEST 1 VIEW: CPT

## 2020-11-18 PROCEDURE — 6360000002 HC RX W HCPCS: Performed by: INTERNAL MEDICINE

## 2020-11-18 PROCEDURE — 36415 COLL VENOUS BLD VENIPUNCTURE: CPT

## 2020-11-18 PROCEDURE — 85025 COMPLETE CBC W/AUTO DIFF WBC: CPT

## 2020-11-18 PROCEDURE — 84156 ASSAY OF PROTEIN URINE: CPT

## 2020-11-18 PROCEDURE — 83880 ASSAY OF NATRIURETIC PEPTIDE: CPT

## 2020-11-18 PROCEDURE — 2580000003 HC RX 258: Performed by: EMERGENCY MEDICINE

## 2020-11-18 PROCEDURE — 82570 ASSAY OF URINE CREATININE: CPT

## 2020-11-18 PROCEDURE — 84550 ASSAY OF BLOOD/URIC ACID: CPT

## 2020-11-18 PROCEDURE — 87205 SMEAR GRAM STAIN: CPT

## 2020-11-18 PROCEDURE — 84484 ASSAY OF TROPONIN QUANT: CPT

## 2020-11-18 RX ORDER — INSULIN LISPRO 100 [IU]/ML
0-6 INJECTION, SOLUTION INTRAVENOUS; SUBCUTANEOUS NIGHTLY
Status: DISCONTINUED | OUTPATIENT
Start: 2020-11-18 | End: 2020-11-24 | Stop reason: HOSPADM

## 2020-11-18 RX ORDER — SODIUM CHLORIDE 9 MG/ML
INJECTION, SOLUTION INTRAVENOUS CONTINUOUS
Status: DISCONTINUED | OUTPATIENT
Start: 2020-11-18 | End: 2020-11-18

## 2020-11-18 RX ORDER — ALLOPURINOL 100 MG/1
100 TABLET ORAL DAILY
Status: DISCONTINUED | OUTPATIENT
Start: 2020-11-18 | End: 2020-11-18 | Stop reason: DRUGHIGH

## 2020-11-18 RX ORDER — ONDANSETRON 2 MG/ML
4 INJECTION INTRAMUSCULAR; INTRAVENOUS EVERY 6 HOURS PRN
Status: DISCONTINUED | OUTPATIENT
Start: 2020-11-18 | End: 2020-11-24 | Stop reason: HOSPADM

## 2020-11-18 RX ORDER — HEPARIN SODIUM 5000 [USP'U]/ML
5000 INJECTION, SOLUTION INTRAVENOUS; SUBCUTANEOUS EVERY 8 HOURS SCHEDULED
Status: DISCONTINUED | OUTPATIENT
Start: 2020-11-18 | End: 2020-11-24 | Stop reason: HOSPADM

## 2020-11-18 RX ORDER — ALLOPURINOL 300 MG/1
300 TABLET ORAL DAILY
Status: ON HOLD | COMMUNITY
Start: 2020-10-23 | End: 2020-11-24 | Stop reason: HOSPADM

## 2020-11-18 RX ORDER — SODIUM CHLORIDE 0.9 % (FLUSH) 0.9 %
10 SYRINGE (ML) INJECTION PRN
Status: DISCONTINUED | OUTPATIENT
Start: 2020-11-18 | End: 2020-11-24 | Stop reason: HOSPADM

## 2020-11-18 RX ORDER — MAGNESIUM SULFATE IN WATER 40 MG/ML
2 INJECTION, SOLUTION INTRAVENOUS PRN
Status: DISCONTINUED | OUTPATIENT
Start: 2020-11-18 | End: 2020-11-18

## 2020-11-18 RX ORDER — HYDRALAZINE HYDROCHLORIDE 50 MG/1
50 TABLET, FILM COATED ORAL 2 TIMES DAILY
Status: DISCONTINUED | OUTPATIENT
Start: 2020-11-18 | End: 2020-11-24 | Stop reason: HOSPADM

## 2020-11-18 RX ORDER — PROMETHAZINE HYDROCHLORIDE 25 MG/1
12.5 TABLET ORAL EVERY 6 HOURS PRN
Status: DISCONTINUED | OUTPATIENT
Start: 2020-11-18 | End: 2020-11-24 | Stop reason: HOSPADM

## 2020-11-18 RX ORDER — ALLOPURINOL 100 MG/1
50 TABLET ORAL
Status: DISCONTINUED | OUTPATIENT
Start: 2020-11-23 | End: 2020-11-18

## 2020-11-18 RX ORDER — ASPIRIN 81 MG/1
81 TABLET ORAL DAILY
COMMUNITY

## 2020-11-18 RX ORDER — CLOBETASOL PROPIONATE 0.5 MG/G
CREAM TOPICAL DAILY PRN
COMMUNITY
End: 2020-12-15

## 2020-11-18 RX ORDER — HYDRALAZINE HYDROCHLORIDE 50 MG/1
50 TABLET, FILM COATED ORAL EVERY 8 HOURS SCHEDULED
Status: DISCONTINUED | OUTPATIENT
Start: 2020-11-18 | End: 2020-11-18 | Stop reason: DRUGHIGH

## 2020-11-18 RX ORDER — DEXTROSE MONOHYDRATE 50 MG/ML
100 INJECTION, SOLUTION INTRAVENOUS PRN
Status: DISCONTINUED | OUTPATIENT
Start: 2020-11-18 | End: 2020-11-24 | Stop reason: HOSPADM

## 2020-11-18 RX ORDER — AMLODIPINE BESYLATE 5 MG/1
5 TABLET ORAL DAILY
Status: DISCONTINUED | OUTPATIENT
Start: 2020-11-18 | End: 2020-11-20

## 2020-11-18 RX ORDER — POTASSIUM CHLORIDE 7.45 MG/ML
10 INJECTION INTRAVENOUS PRN
Status: DISCONTINUED | OUTPATIENT
Start: 2020-11-18 | End: 2020-11-18

## 2020-11-18 RX ORDER — UBIDECARENONE 100 MG
100 CAPSULE ORAL DAILY
COMMUNITY
End: 2022-02-04

## 2020-11-18 RX ORDER — SODIUM CHLORIDE, SODIUM LACTATE, POTASSIUM CHLORIDE, CALCIUM CHLORIDE 600; 310; 30; 20 MG/100ML; MG/100ML; MG/100ML; MG/100ML
1000 INJECTION, SOLUTION INTRAVENOUS ONCE
Status: COMPLETED | OUTPATIENT
Start: 2020-11-18 | End: 2020-11-18

## 2020-11-18 RX ORDER — NICOTINE POLACRILEX 4 MG
15 LOZENGE BUCCAL PRN
Status: DISCONTINUED | OUTPATIENT
Start: 2020-11-18 | End: 2020-11-24 | Stop reason: HOSPADM

## 2020-11-18 RX ORDER — VITAMIN B COMPLEX
1000 TABLET ORAL DAILY
Status: DISCONTINUED | OUTPATIENT
Start: 2020-11-18 | End: 2020-11-24 | Stop reason: HOSPADM

## 2020-11-18 RX ORDER — ASPIRIN 81 MG/1
81 TABLET ORAL DAILY
Status: DISCONTINUED | OUTPATIENT
Start: 2020-11-18 | End: 2020-11-24 | Stop reason: HOSPADM

## 2020-11-18 RX ORDER — ERGOCALCIFEROL 1.25 MG/1
50000 CAPSULE ORAL WEEKLY
Status: DISCONTINUED | OUTPATIENT
Start: 2020-11-18 | End: 2020-11-18 | Stop reason: DRUGHIGH

## 2020-11-18 RX ORDER — GLIPIZIDE 2.5 MG/1
2.5 TABLET, EXTENDED RELEASE ORAL 2 TIMES DAILY WITH MEALS
COMMUNITY

## 2020-11-18 RX ORDER — INSULIN LISPRO 100 [IU]/ML
0-12 INJECTION, SOLUTION INTRAVENOUS; SUBCUTANEOUS
Status: DISCONTINUED | OUTPATIENT
Start: 2020-11-18 | End: 2020-11-24 | Stop reason: HOSPADM

## 2020-11-18 RX ORDER — AMLODIPINE BESYLATE 5 MG/1
5 TABLET ORAL DAILY
Status: ON HOLD | COMMUNITY
End: 2020-11-24 | Stop reason: HOSPADM

## 2020-11-18 RX ORDER — DEXTROSE MONOHYDRATE 25 G/50ML
12.5 INJECTION, SOLUTION INTRAVENOUS PRN
Status: DISCONTINUED | OUTPATIENT
Start: 2020-11-18 | End: 2020-11-24 | Stop reason: HOSPADM

## 2020-11-18 RX ORDER — HYDRALAZINE HYDROCHLORIDE 50 MG/1
50 TABLET, FILM COATED ORAL 2 TIMES DAILY
COMMUNITY

## 2020-11-18 RX ORDER — ACETAMINOPHEN 325 MG/1
650 TABLET ORAL EVERY 6 HOURS PRN
Status: DISCONTINUED | OUTPATIENT
Start: 2020-11-18 | End: 2020-11-24 | Stop reason: HOSPADM

## 2020-11-18 RX ORDER — ACETAMINOPHEN 650 MG/1
650 SUPPOSITORY RECTAL EVERY 6 HOURS PRN
Status: DISCONTINUED | OUTPATIENT
Start: 2020-11-18 | End: 2020-11-24 | Stop reason: HOSPADM

## 2020-11-18 RX ORDER — SODIUM CHLORIDE 0.9 % (FLUSH) 0.9 %
10 SYRINGE (ML) INJECTION EVERY 12 HOURS SCHEDULED
Status: DISCONTINUED | OUTPATIENT
Start: 2020-11-18 | End: 2020-11-24 | Stop reason: HOSPADM

## 2020-11-18 RX ADMIN — HEPARIN SODIUM 5000 UNITS: 5000 INJECTION INTRAVENOUS; SUBCUTANEOUS at 09:12

## 2020-11-18 RX ADMIN — SERTRALINE HYDROCHLORIDE 50 MG: 50 TABLET ORAL at 11:48

## 2020-11-18 RX ADMIN — HEPARIN SODIUM 5000 UNITS: 5000 INJECTION INTRAVENOUS; SUBCUTANEOUS at 16:27

## 2020-11-18 RX ADMIN — HYDRALAZINE HYDROCHLORIDE 50 MG: 50 TABLET, FILM COATED ORAL at 21:31

## 2020-11-18 RX ADMIN — SODIUM CHLORIDE, POTASSIUM CHLORIDE, SODIUM LACTATE AND CALCIUM CHLORIDE 1000 ML: 600; 310; 30; 20 INJECTION, SOLUTION INTRAVENOUS at 06:38

## 2020-11-18 RX ADMIN — HYDRALAZINE HYDROCHLORIDE 50 MG: 50 TABLET, FILM COATED ORAL at 11:31

## 2020-11-18 RX ADMIN — TICAGRELOR 60 MG: 60 TABLET ORAL at 21:32

## 2020-11-18 RX ADMIN — TICAGRELOR 60 MG: 60 TABLET ORAL at 12:42

## 2020-11-18 RX ADMIN — HEPARIN SODIUM 5000 UNITS: 5000 INJECTION INTRAVENOUS; SUBCUTANEOUS at 21:35

## 2020-11-18 RX ADMIN — SODIUM BICARBONATE: 84 INJECTION, SOLUTION INTRAVENOUS at 12:41

## 2020-11-18 RX ADMIN — ACETAMINOPHEN 650 MG: 325 TABLET ORAL at 16:30

## 2020-11-18 RX ADMIN — Medication 10 ML: at 11:49

## 2020-11-18 RX ADMIN — AMLODIPINE BESYLATE 5 MG: 5 TABLET ORAL at 11:31

## 2020-11-18 RX ADMIN — SODIUM CHLORIDE 1000 ML: 9 INJECTION, SOLUTION INTRAVENOUS at 09:09

## 2020-11-18 RX ADMIN — SODIUM BICARBONATE: 84 INJECTION, SOLUTION INTRAVENOUS at 21:32

## 2020-11-18 RX ADMIN — Medication 1000 UNITS: at 11:31

## 2020-11-18 RX ADMIN — METOPROLOL TARTRATE 25 MG: 25 TABLET, FILM COATED ORAL at 21:31

## 2020-11-18 RX ADMIN — METOPROLOL TARTRATE 25 MG: 25 TABLET, FILM COATED ORAL at 11:48

## 2020-11-18 RX ADMIN — ASPIRIN 81 MG: 81 TABLET, COATED ORAL at 11:31

## 2020-11-18 ASSESSMENT — PAIN SCALES - GENERAL
PAINLEVEL_OUTOF10: 0
PAINLEVEL_OUTOF10: 3

## 2020-11-18 ASSESSMENT — ENCOUNTER SYMPTOMS
NAUSEA: 0
COUGH: 0
EYE PAIN: 0
SHORTNESS OF BREATH: 0
ABDOMINAL PAIN: 0
VOMITING: 0
WHEEZING: 0
DIARRHEA: 1

## 2020-11-18 ASSESSMENT — PAIN DESCRIPTION - PROGRESSION: CLINICAL_PROGRESSION: NOT CHANGED

## 2020-11-18 ASSESSMENT — PAIN DESCRIPTION - FREQUENCY: FREQUENCY: INTERMITTENT

## 2020-11-18 ASSESSMENT — PAIN DESCRIPTION - PAIN TYPE
TYPE: ACUTE PAIN
TYPE: ACUTE PAIN

## 2020-11-18 ASSESSMENT — PAIN DESCRIPTION - DESCRIPTORS: DESCRIPTORS: ACHING

## 2020-11-18 ASSESSMENT — PAIN DESCRIPTION - ONSET: ONSET: GRADUAL

## 2020-11-18 ASSESSMENT — PAIN - FUNCTIONAL ASSESSMENT: PAIN_FUNCTIONAL_ASSESSMENT: ACTIVITIES ARE NOT PREVENTED

## 2020-11-18 ASSESSMENT — PAIN DESCRIPTION - LOCATION: LOCATION: HEAD

## 2020-11-18 NOTE — H&P
Hospital Medicine History & Physical      PCP: Manda Mcarthur    Date of Admission: 2020    Chief Complaint: Diarrhea, dehydration      History Of Present Illness:  Patient is a 79-year-old female with past medical history of diabetes mellitus, hypertension who presents to the hospital for diarrhea. According to the patient she tested positive for Covid 19 infection last  and she has been having diarrhea around 4-5 bowel movements every day, watery, nonbloody. Denies nausea vomiting. Patient also mentions she feels thirsty and feels like she has dizziness. Patient denies fevers chills chest pain shortness of breath nausea vomiting dysuria. Past Medical History:          Diagnosis Date    CAD (coronary artery disease)     CAD (coronary artery disease) 10/20/2016    Distal RCA 3.0 x 15 mm Alpine CRISTEL    Essential hypertension        Past Surgical History:          Procedure Laterality Date    ANGIOPLASTY      GS    APPENDECTOMY      BREAST BIOPSY       SECTION      CHOLECYSTECTOMY      HERNIA REPAIR         Medications Prior to Admission:      Prior to Admission medications    Medication Sig Start Date End Date Taking?  Authorizing Provider   allopurinol (ZYLOPRIM) 300 MG tablet Take 300 mg by mouth daily 10/23/20  Yes Historical Provider, MD   amLODIPine (NORVASC) 5 MG tablet Take 5 mg by mouth daily   Yes Historical Provider, MD   aspirin 81 MG EC tablet Take 81 mg by mouth daily   Yes Historical Provider, MD   glipiZIDE (GLUCOTROL XL) 2.5 MG extended release tablet Take 2.5 mg by mouth 2 times daily (with meals)   Yes Historical Provider, MD   metoprolol tartrate (LOPRESSOR) 25 MG tablet Take 25 mg by mouth 2 times daily   Yes Historical Provider, MD   hydrALAZINE (APRESOLINE) 50 MG tablet Take 50 mg by mouth 2 times daily   Yes Historical Provider, MD   sertraline (ZOLOFT) 50 MG tablet Take 50 mg by mouth daily   Yes Historical Provider, MD   vitamin D (CHOLECALCIFEROL) 25 MCG (1000 UT) TABS tablet Take 1,000 Units by mouth daily   Yes Historical Provider, MD   coenzyme Q10 100 MG CAPS capsule Take 100 mg by mouth daily   Yes Historical Provider, MD   BRILINTA 60 MG TABS tablet TAKE 1 TABLET TWICE A DAY 8/10/20  Yes ALETA Gunn CNP   REPATHA SURECLICK 922 MG/ML SOAJ INJECT CONTENTS OF 1 PEN SUBCUTANEOUSLY EVERY 14 DAYS 10/17/19  Yes ALETA Gunn CNP   clobetasol (TEMOVATE) 0.05 % cream Apply topically daily as needed Apply topically to vaginal area when needed    Historical Provider, MD       Allergies:  Crestor [rosuvastatin calcium]; Statins; and Repatha [evolocumab]    Social History:      TOBACCO:   reports that she has never smoked. She has never used smokeless tobacco.  ETOH:   reports no history of alcohol use. Family History:       Reviewed in detail and non contributory      History reviewed. No pertinent family history. REVIEW OF SYSTEMS:   Pertinent positives as noted in the HPI. All other systems reviewed and negative. PHYSICAL EXAM PERFORMED:    /66   Pulse 93   Temp 98.2 °F (36.8 °C) (Axillary)   Resp 18   Ht 5' (1.524 m)   Wt 235 lb (106.6 kg)   SpO2 97%   BMI 45.90 kg/m²     General appearance:  No apparent distress, cooperative. HEENT:  Normal cephalic, atraumatic without obvious deformity. Conjunctivae/corneas clear. Neck: Supple, with full range of motion. No cervical lymphadenopathy  Respiratory:  Normal respiratory effort. Clear to auscultation, bilaterally without Rales/Wheezes/Rhonchi. Cardiovascular:  Regular rate and rhythm with normal S1/S2 without murmurs, rubs or gallops. Abdomen: Soft, non-tender, non-distended, normal bowel sounds. Musculoskeletal:  No edema noted bilaterally. No tenderness on palpation   Skin: no rash visible  Neurologic:  Neurologically intact without any focal sensory/motor deficits.   grossly non-focal.  Psychiatric:  Alert and oriented, normal mood  Peripheral choices (60 gms)/meal  Code Status: Full Code    PT/OT Eval Status: ordered    Dispo - pending clinical improvement       Najma Hoffmann MD    The note was completed using EMR and Dragon dictation system. Every effort was made to ensure accuracy; however, inadvertent computerized transcription errors may be present. Thank you Santos Beverly for the opportunity to be involved in this patient's care. If you have any questions or concerns please feel free to contact me at [phone].     Najma Hoffmann MD

## 2020-11-18 NOTE — ED PROVIDER NOTES
[address]          ATTENDING PHYSICIAN NOTE       Date of evaluation: 11/18/2020    Chief Complaint     Dizziness (COVID +) and Other (pt thinks there is a problem with her medication doses (ALLOPURINOL 100mg))      History of Present Illness     Lakhwinder Ramirez is a 79 y.o. female who presents with chief complaint of dizziness. The patient states that she has been feeling intermittently dizzy which she describes as lightheadedness which is worsened with positional changes for the past 3 to 4 days. She states that she was diagnosed with COVID-19 by a swab on Thursday of last week. She had a exposure to household contact who was positive about 15 days ago. The only symptom that she can state that she is having was some mild diarrhea and generalized fatigue with decreased appetite. No abdominal pain. No cough, chest pain or difficulty breathing. No vertigo. No headaches. No fevers. She states her diarrhea is actually improving and was only a small amount per day. No blood in her stools. Was also recently started on allopurinol 300 mg daily for her gout and is concerned this may be contributing to her dizziness. Her gout is actually much better. Review of Systems     Review of Systems   Constitutional: Positive for fatigue. Negative for chills and fever. HENT: Negative for congestion. Eyes: Negative for pain. Respiratory: Negative for cough, shortness of breath and wheezing. Cardiovascular: Negative for chest pain and leg swelling. Gastrointestinal: Positive for diarrhea. Negative for abdominal pain, nausea and vomiting. Genitourinary: Negative for dysuria. Musculoskeletal: Negative for arthralgias. Skin: Negative for rash. Neurological: Positive for light-headedness. Negative for weakness and headaches. All other systems reviewed and are negative.       Past Medical, Surgical, Family, and Social History         Diagnosis Date    CAD (coronary artery disease)     CAD (coronary artery disease) 10/20/2016    Distal RCA 3.0 x 15 mm Alpine CRISTEL    Essential hypertension          Procedure Laterality Date    ANGIOPLASTY      GS    APPENDECTOMY      BREAST BIOPSY       SECTION      CHOLECYSTECTOMY      HERNIA REPAIR       Her family history is not on file. She reports that she has never smoked. She has never used smokeless tobacco. She reports that she does not drink alcohol. Medications     Previous Medications    ALLOPURINOL (ZYLOPRIM) 100 MG TABLET    Take 100 mg by mouth    AMLODIPINE (NORVASC) 5 MG TABLET    Take 1 tablet by mouth daily    ASPIRIN EC 81 MG EC TABLET    Take 1 tablet by mouth daily    BRILINTA 60 MG TABS TABLET    TAKE 1 TABLET TWICE A DAY    GLIPIZIDE (GLUCOTROL) 5 MG TABLET    Take 1 tablet by mouth    HYDRALAZINE (APRESOLINE) 50 MG TABLET    Take 1 tablet by mouth every 8 hours    MAGNESIUM OXIDE (MAG-OX) 400 MG TABLET    Take 1 tablet by mouth daily    METOPROLOL TARTRATE (LOPRESSOR) 25 MG TABLET    Take 1 tablet by mouth 2 times daily    REPATHA SURECLICK 917 MG/ML SOAJ    INJECT CONTENTS OF 1 PEN SUBCUTANEOUSLY EVERY 14 DAYS    SERTRALINE (ZOLOFT) 50 MG TABLET    TAKE ONE AND ONE-HALF TABLETS DAILY    VITAMIN D (ERGOCALCIFEROL) 58099 UNITS CAPS CAPSULE    Take 1 capsule by mouth       Allergies     She is allergic to crestor [rosuvastatin calcium]; statins; and repatha [evolocumab]. Physical Exam     ED Triage Vitals [20 0522]   Enc Vitals Group      /71      Pulse 98      Resp 18      Temp 98 °F (36.7 °C)      Temp Source Oral      SpO2 95 %      Weight       Height       Head Circumference       Peak Flow       Pain Score       Pain Loc       Pain Edu? Excl. in [address]? General:  Non-toxic, no acute distress, fully cooperative with my exam    HEENT:  NCAT    Neck:  Supple    Pulmonary:   No increased work of breathing; CTAB    Cardiac:  RRR, no murmur, thrill or gallop.  Capillary refill <3s. 2+ distal pulses    Abdomen:  Soft, nontender, nondistended; no focal rebound or guarding    Musculoskeletal:  Grossly intact without obvious injury or deformity    Neuro: Awake alert and oriented x4, cranial nerves II through XII intact, 5/5 strength of the bilateral upper and lower extremities, sensation intact to light touch diffusely, finger-to-nose without dysmetria, normal gait without ataxia    Skin: No rashes      Diagnostic Results     EKG   Sinus rhythm. Ventricular rate of 91 bpm.  Normal axes and intervals. No acute ST or T wave changes. Overall, normal EKG. RADIOLOGY:  XR CHEST PORTABLE   Final Result       Slight bibasilar opacities. Cardiomegaly.           LABS:   Results for orders placed or performed during the hospital encounter of 11/18/20   CBC Auto Differential   Result Value Ref Range    WBC 11.3 (H) 4.0 - 11.0 K/uL    RBC 4.09 4.00 - 5.20 M/uL    Hemoglobin 12.6 12.0 - 16.0 g/dL    Hematocrit 38.3 36.0 - 48.0 %    MCV 93.7 80.0 - 100.0 fL    MCH 30.9 26.0 - 34.0 pg    MCHC 33.0 31.0 - 36.0 g/dL    RDW 14.7 12.4 - 15.4 %    Platelets 541 283 - 777 K/uL    MPV 7.9 5.0 - 10.5 fL   Basic Metabolic Panel w/ Reflex to MG   Result Value Ref Range    Sodium 135 (L) 136 - 145 mmol/L    Potassium reflex Magnesium 4.9 3.5 - 5.1 mmol/L    Chloride 103 99 - 110 mmol/L    CO2 15 (L) 21 - 32 mmol/L    Anion Gap 17 (H) 3 - 16    Glucose 84 70 - 99 mg/dL    BUN 58 (H) 7 - 20 mg/dL    CREATININE 4.8 (H) 0.6 - 1.2 mg/dL    GFR Non-African American 9 (A) >60    GFR  11 (A) >60    Calcium 9.5 8.3 - 10.6 mg/dL   Troponin   Result Value Ref Range    Troponin 0.06 (H) <0.01 ng/mL   Brain Natriuretic Peptide   Result Value Ref Range    Pro- (H) 0 - 124 pg/mL       RECENT VITALS:  BP: 113/69, Temp: 98 °F (36.7 °C), Pulse: 93, Resp: 16, SpO2: 93 %     Procedures         ED Course     Nursing Notes, Past Medical Hx, Past Surgical Hx, Social Hx, Allergies, and Family Hx were reviewed. The patient was given the following medications:  Orders Placed This Encounter   Medications    lactated ringers infusion 1,000 mL       CONSULTS:  Bennie Padron / OLAF / Richard Edin is a 79 y.o. female with recent diagnosis of COVID-19 who presents with orthostatic lightheadedness. On examination she had no murmur. EKG showed no arrhythmia or ischemic changes. CXR did show some mild cardiomegaly with bibasilar infiltrates potentially concerning for COVID-19 viral pneumonia but the cardiomegaly appears to be new. Does have a history of coronary artery disease previously. Troponin is modestly elevated at 0.06 which raises some concern for COVID-19 myocarditis and possible cardiomyopathy. However she is also noted to have fairly significant acute renal failure may indicate that her renal failure is contributing to difficulty clearing troponin and that may be the cause for elevation. This would indicate more severe dehydration that I previously anticipated clinically. Call has been placed to the hospitalist to discuss admission. Clinical Impression     1. NSTEMI (non-ST elevated myocardial infarction) ([facility] [address]    2. COVID-19        Disposition     PATIENT REFERRED TO:  No follow-up provider specified.     DISCHARGE MEDICATIONS:  New Prescriptions    No medications on file       37[address] Pooja Ramirez MD  11/18/20 [id number]

## 2020-11-18 NOTE — PROGRESS NOTES
Attempted to obtain trop level however nurse was unsuccessful. Call to lab for completion.  Electronically signed by Morena Tijerina RN on 11/18/2020 at 2:28 PM

## 2020-11-18 NOTE — CARE COORDINATION
Referred to patient for d/c planning. Spoke to patient. Patient is a 79year old female admitted for NSTEMI and COVID+. Patient usually lives at home with friend. Patient reports she is independent in ADLs. Patient currently denies d/c needs. Case Management Assessment           Initial Evaluation                Date / Time of Evaluation: 11/18/2020 3:20 PM                 Assessment Completed by: Kain Bryson    Patient Name: Kayla Haney     YOB: 1950  Diagnosis: COVID-19 [U07.1]  COVID-19 [U07.1]     Date / Time: 11/18/2020  5:08 AM    Patient Admission Status: Inpatient    If patient is discharged prior to next notation, then this note serves as note for discharge by case management.      Current PCP: [address] [place] [address] Vinay Patient: No    Chart Reviewed: Yes  Patient/ Family Interviewed: Yes    Initial assessment completed at bedside with: Patient    Hospitalization in the last 30 days: No    Emergency Contacts:  Extended Emergency Contact Information  Primary Emergency Contact: [address] Phone: [phone]  Mobile Phone: [phone]  Relation: Child  Secondary Emergency Contact: Julien Ortiz  Phone: [phone]  Relation: Other    Advance Directives:   Code Status: Full 2021 Marline Burnsy: Yes  Agent: Lisette  Contact Number:     Copy present: No     In paper Chart: No    Scanned into EMR No    Financial  Payor: Rahel De La Cruz / Plan: Alecia Aldana PPO / Product Type: Medicare /     Pre-cert required for SNF: Yes    Pharmacy    [facility]  Naomi Evangelista, [address] - F [phone]  [address] [place] [address] Ave  Phone: [phone] Fax: [phone] [address] Evangelista, [address] [phone] - f [phone]  [facility] [address]  Phone: [phone] Fax: [phone]    [address] [place] [address] Needed    Transportation PLAN for discharge: family     Factors facilitating achievement of predicted outcomes: Family support, Motivated, Cooperative and Pleasant    Barriers to discharge: Medical complications    Additional Case Management Notes: see above    The Plan for Transition of Care is related to the following treatment goals of COVID-19 [U07.1]  COVID-19 [U07.1]    The Patient and/or patient representative Izzy Lopez and her family were provided with a choice of provider and agrees with the discharge plan Not Indicated    Freedom of choice list was provided with basic dialogue that supports the patient's individualized plan of care/goals and shares the quality data associated with the providers.  Not Indicated    Care Transition patient: No    JAVIER Fernandez, LYDIA-S  [facility] [employer]  Case Management Department  Ph: [phone]

## 2020-11-18 NOTE — ED NOTES
Assisted to [facility] to void. Slight shortness of breath upon returning to bed.       Jacoby Arnett RN  11/18/20 [id number]

## 2020-11-18 NOTE — CONSULTS
- Gout    ROS:       positives in bold   Constitutional:  fever, chills, weakness, weight change, fatigue  Skin:  rash, pruritus, hair loss, bruising, dry skin, petechiae  Head, Face, Neck   headaches, swelling,  cervical adenopathy  Respiratory: shortness of breath, cough, or wheezing  Cardiovascular: chest pain, palpitations, dizzy, edema  Gastrointestinal: nausea, vomiting, diarrhea, constipation,belly pain    Yellow skin, blood in stool  Musculoskeletal:  back pain, muscle weakness, gait problems,       joint pain or swelling. Genitourinary:  dysuria, poor urine flow, flank pain, blood in urine  Neurologic:  vertigo, TIA'S, syncope, seizures, focal weakness  Psychosocial:  insomnia, anxiety, or depression. 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 She has never used smokeless tobacco. She reports that she does not drink alcohol.    family history is not on file.          Medication:     Current Facility-Administered Medications: heparin (porcine) injection 5,000 Units, 5,000 Units, Subcutaneous, 3 times per day  sodium chloride flush 0.9 % injection 10 mL, 10 mL, Intravenous, 2 times per day  sodium chloride flush 0.9 % injection 10 mL, 10 mL, Intravenous, PRN  acetaminophen (TYLENOL) tablet 650 mg, 650 mg, Oral, Q6H PRN **OR** acetaminophen (TYLENOL) suppository 650 mg, 650 mg, Rectal, Q6H PRN  magnesium hydroxide (MILK OF MAGNESIA) 400 MG/5ML suspension 30 mL, 30 mL, Oral, Daily PRN  promethazine (PHENERGAN) tablet 12.5 mg, 12.5 mg, Oral, Q6H PRN **OR** ondansetron (ZOFRAN) injection 4 mg, 4 mg, Intravenous, Q6H PRN  0.9 % sodium chloride infusion, , Intravenous, Continuous  allopurinol (ZYLOPRIM) tablet 100 mg, 100 mg, Oral, Daily  amLODIPine (NORVASC) tablet 5 mg, 5 mg, Oral, Daily  aspirin EC tablet 81 mg, 81 mg, Oral, Daily  ticagrelor (BRILINTA) tablet 60 mg, 60 mg, Oral, BID  hydrALAZINE (APRESOLINE) tablet 50 mg, 50 mg, Oral, 3 times per day  metoprolol tartrate (LOPRESSOR) tablet 25 mg, 25 mg, Oral, BID  sertraline (ZOLOFT) tablet 50 mg, 50 mg, Oral, Daily  vitamin D (ERGOCALCIFEROL) capsule 50,000 Units, 50,000 Units, Oral, Weekly       Vitals :     Vitals:    11/18/20 0930   BP: (!) 133/94   Pulse: 88   Resp: 20   Temp:    SpO2: 96%       I & O :       Intake/Output Summary (Last 24 hours) at 11/18/2020 0959  Last data filed at 11/18/2020 0744  Gross per 24 hour   Intake 1000 ml   Output --   Net 1000 ml        Physical Examination :     General appearance: Anxious- no, distressed- no, in good spirits- yes  HEENT: Lips- normal, teeth- ok , oral mucosa- dry, cracked  Neck : Mass- no, appears symmetrical, JVD- not visible  Respiratory: Respiratory effort-  normal, wheeze- no, crackles - no  Cardiovascular:  Ausculation- No M/R/G, Edema no  Abdomen: visible mass- no, distention- no, scar- no, tenderness- no                            hepatosplenomegaly-  no  Musculoskeletal:  clubbing no,cyanosis- no , digital ischemia- no                           muscle strength- grossly normal , tone - grossly normal  Skin: rashes- no , ulcers- no, induration- no, tightening - no  Psychiatric:  Judgement and insight- normal           AAO X 3  Additional finding: skin is dry     LABS:-      Recent Labs     11/18/20  0541   WBC 11.3*   HGB 12.6   HCT 38.3        Recent Labs     11/18/20  0541   *   K 4.9      CO2 15*   BUN 58*   CREATININE 4.8*   GLUCOSE 84      Bryan Rouse PGY2  10:24 AM  11/18/2020    Will Discuss with Dr Merry Landa    Patient was seen and examined and the case was discussed with the resident. He acted as my scribe. I agree with the assessment and plan.     Thanks  Nephrology  [address] [facility] Mail, [address] [facility]  Office: [phone]  Cell: [phone]  Fax: [phone]

## 2020-11-18 NOTE — ED NOTES
Report called to [facility] on PCU.       Christine Thomas RN  11/18/20 9780 Gerald Hogan RN  11/18/20 6621

## 2020-11-18 NOTE — PROGRESS NOTES
Unable to obtain c diff specimen this shift. Will report to oncoming shift.  Electronically signed by Sarah Bermeo RN on 11/18/2020 at 6:03 PM

## 2020-11-18 NOTE — PROGRESS NOTES
Attempted to obtain urine however pt has loose stool and stool was present in specimen. Will attempt once again when pt is able.  Electronically signed by Jose Kim RN on 11/18/2020 at 12:09 PM

## 2020-11-18 NOTE — ACP (ADVANCE CARE PLANNING)
Advance Care Planning     Advance Care Planning (ACP) Physician/NP/PA (Provider) Conversation      Date of ACP Conversation: 11/18/2020    Conversation Conducted with:   Patient with [address] Maker:    Current Designated Health Care Decision Maker:    Primary Decision Maker: Ping Olson - [phone]  . Care Preferences:    Hospitalization: \"If your health worsens and it becomes clear that your chance of recovery is unlikely, what would your preference be regarding hospitalization? \"  If the patient would want hospitalization, answer \"yes\". If the patient would prefer comfort-focused treatment without hospitalization, answer \"no\". YES/NO/WILD CARDS: yes      Ventilation: \"If you were in your present state of health and suddenly became very ill and were unable to breathe on your own, what would your preference be about the use of a ventilator (breathing machine) if it was available to you? \"    If patient would desire the use of a ventilator (breathing machine), answer \"yes\", if not answer \"no\":yes      Resuscitation:  \"CPR works best to restart the heart when there is a sudden event, like a heart attack, in someone who is otherwise healthy. Unfortunately, CPR does not typically restart the heart for people who have serious health conditions or who are very sick. \"    \"In the event your heart stopped as a result of an underlying serious health condition, would you want attempts to be made to restart your heart (answer \"yes\" for attempt to resuscitate) or would you prefer a natural death (answer \"no\" for do not attempt to resuscitate)? \"   yes         Conversation Outcomes / Follow-Up Plan:   No ACP gaps identified      Length of Voluntary ACP Conversation in minutes:  <16 minutes (Non-Billable)    [facility]

## 2020-11-18 NOTE — CONSULTS
Clinical Pharmacy Progress Note  Pharmacy to assist with Home Medication information    Admit date: 11/18/2020     Subjective/Objective:  Patient is a 73yr old female from home admitted with COVID related sx and CHRISTINE. Pharmacy has been asked by Dr. Eloy Alatorre to assist with obtaining home medication information. Assessment/Plan:    1. Home Medication information:    · Spoke with patient via phone in her ER room. She is very knowledgeable about her medications. [facility]. · Home Medication List in Epic is complete. Changes made to medication list:   Medications removed (no longer taking):  · Mag Ox    Medications added:   · Coenzyme Q10    Medication doses / instructions adjusted:   · Allopurinol -- increased to 300mg daily on 10/23  · Hydralazine -- BID (not TID)  · Glipizide -- changed to XL form, changed dose  · Vit D -- dose change      A Perfect Serve message has been sent to Dr. Joanne Montaño to discuss the updated [address] List.      Thank you, please call with questions.    Juan M Conde PharmD., BCPS   11/18/2020 9:53 AM  Wireless: [phone]

## 2020-11-19 ENCOUNTER — APPOINTMENT (OUTPATIENT)
Dept: ULTRASOUND IMAGING | Age: 70
DRG: 178 | End: 2020-11-19
Payer: MEDICARE

## 2020-11-19 LAB
ANION GAP SERPL CALCULATED.3IONS-SCNC: 16 MMOL/L (ref 3–16)
BUN BLDV-MCNC: 66 MG/DL (ref 7–20)
CALCIUM SERPL-MCNC: 9.1 MG/DL (ref 8.3–10.6)
CHLORIDE BLD-SCNC: 102 MMOL/L (ref 99–110)
CO2: 14 MMOL/L (ref 21–32)
CREAT SERPL-MCNC: 4.5 MG/DL (ref 0.6–1.2)
EOSINOPHIL,URINE: NORMAL
ESTIMATED AVERAGE GLUCOSE: 159.9 MG/DL
GFR AFRICAN AMERICAN: 12
GFR NON-AFRICAN AMERICAN: 10
GLUCOSE BLD-MCNC: 126 MG/DL (ref 70–99)
GLUCOSE BLD-MCNC: 163 MG/DL (ref 70–99)
GLUCOSE BLD-MCNC: 183 MG/DL (ref 70–99)
GLUCOSE BLD-MCNC: 92 MG/DL (ref 70–99)
HBA1C MFR BLD: 7.2 %
HCT VFR BLD CALC: 35.7 % (ref 36–48)
HEMOGLOBIN: 11.8 G/DL (ref 12–16)
MCH RBC QN AUTO: 31.2 PG (ref 26–34)
MCHC RBC AUTO-ENTMCNC: 32.9 G/DL (ref 31–36)
MCV RBC AUTO: 94.7 FL (ref 80–100)
PDW BLD-RTO: 14.5 % (ref 12.4–15.4)
PERFORMED ON: ABNORMAL
PLATELET # BLD: 404 K/UL (ref 135–450)
PMV BLD AUTO: 7.7 FL (ref 5–10.5)
POTASSIUM REFLEX MAGNESIUM: 3.7 MMOL/L (ref 3.5–5.1)
RBC # BLD: 3.77 M/UL (ref 4–5.2)
SARS-COV-2, PCR: DETECTED
SODIUM BLD-SCNC: 132 MMOL/L (ref 136–145)
WBC # BLD: 9.9 K/UL (ref 4–11)

## 2020-11-19 PROCEDURE — 97165 OT EVAL LOW COMPLEX 30 MIN: CPT

## 2020-11-19 PROCEDURE — 2580000003 HC RX 258: Performed by: INTERNAL MEDICINE

## 2020-11-19 PROCEDURE — 6370000000 HC RX 637 (ALT 250 FOR IP): Performed by: INTERNAL MEDICINE

## 2020-11-19 PROCEDURE — 2060000000 HC ICU INTERMEDIATE R&B

## 2020-11-19 PROCEDURE — 97161 PT EVAL LOW COMPLEX 20 MIN: CPT

## 2020-11-19 PROCEDURE — 76770 US EXAM ABDO BACK WALL COMP: CPT

## 2020-11-19 PROCEDURE — 80048 BASIC METABOLIC PNL TOTAL CA: CPT

## 2020-11-19 PROCEDURE — 6370000000 HC RX 637 (ALT 250 FOR IP): Performed by: STUDENT IN AN ORGANIZED HEALTH CARE EDUCATION/TRAINING PROGRAM

## 2020-11-19 PROCEDURE — 85027 COMPLETE CBC AUTOMATED: CPT

## 2020-11-19 PROCEDURE — 2500000003 HC RX 250 WO HCPCS: Performed by: INTERNAL MEDICINE

## 2020-11-19 PROCEDURE — U0003 INFECTIOUS AGENT DETECTION BY NUCLEIC ACID (DNA OR RNA); SEVERE ACUTE RESPIRATORY SYNDROME CORONAVIRUS 2 (SARS-COV-2) (CORONAVIRUS DISEASE [COVID-19]), AMPLIFIED PROBE TECHNIQUE, MAKING USE OF HIGH THROUGHPUT TECHNOLOGIES AS DESCRIBED BY CMS-2020-01-R: HCPCS

## 2020-11-19 PROCEDURE — 6360000002 HC RX W HCPCS: Performed by: INTERNAL MEDICINE

## 2020-11-19 PROCEDURE — 97116 GAIT TRAINING THERAPY: CPT

## 2020-11-19 PROCEDURE — 97535 SELF CARE MNGMENT TRAINING: CPT

## 2020-11-19 RX ORDER — LACTOBACILLUS RHAMNOSUS GG 10B CELL
1 CAPSULE ORAL DAILY
Status: DISCONTINUED | OUTPATIENT
Start: 2020-11-19 | End: 2020-11-24 | Stop reason: HOSPADM

## 2020-11-19 RX ADMIN — Medication 10 ML: at 07:53

## 2020-11-19 RX ADMIN — SODIUM BICARBONATE: 84 INJECTION, SOLUTION INTRAVENOUS at 21:48

## 2020-11-19 RX ADMIN — METOPROLOL TARTRATE 25 MG: 25 TABLET, FILM COATED ORAL at 20:30

## 2020-11-19 RX ADMIN — TICAGRELOR 60 MG: 60 TABLET ORAL at 07:52

## 2020-11-19 RX ADMIN — HEPARIN SODIUM 5000 UNITS: 5000 INJECTION INTRAVENOUS; SUBCUTANEOUS at 20:30

## 2020-11-19 RX ADMIN — HEPARIN SODIUM 5000 UNITS: 5000 INJECTION INTRAVENOUS; SUBCUTANEOUS at 05:27

## 2020-11-19 RX ADMIN — ASPIRIN 81 MG: 81 TABLET, COATED ORAL at 07:47

## 2020-11-19 RX ADMIN — AMLODIPINE BESYLATE 5 MG: 5 TABLET ORAL at 07:47

## 2020-11-19 RX ADMIN — INSULIN LISPRO 1 UNITS: 100 INJECTION, SOLUTION INTRAVENOUS; SUBCUTANEOUS at 23:39

## 2020-11-19 RX ADMIN — TICAGRELOR 60 MG: 60 TABLET ORAL at 21:48

## 2020-11-19 RX ADMIN — HYDRALAZINE HYDROCHLORIDE 50 MG: 50 TABLET, FILM COATED ORAL at 07:47

## 2020-11-19 RX ADMIN — Medication 1000 UNITS: at 07:47

## 2020-11-19 RX ADMIN — Medication 1 CAPSULE: at 11:45

## 2020-11-19 RX ADMIN — Medication 10 ML: at 20:31

## 2020-11-19 RX ADMIN — HEPARIN SODIUM 5000 UNITS: 5000 INJECTION INTRAVENOUS; SUBCUTANEOUS at 14:40

## 2020-11-19 RX ADMIN — METOPROLOL TARTRATE 25 MG: 25 TABLET, FILM COATED ORAL at 07:47

## 2020-11-19 RX ADMIN — SERTRALINE HYDROCHLORIDE 50 MG: 50 TABLET ORAL at 07:47

## 2020-11-19 RX ADMIN — SODIUM BICARBONATE: 84 INJECTION, SOLUTION INTRAVENOUS at 09:52

## 2020-11-19 RX ADMIN — HYDRALAZINE HYDROCHLORIDE 50 MG: 50 TABLET, FILM COATED ORAL at 20:30

## 2020-11-19 ASSESSMENT — PAIN SCALES - GENERAL
PAINLEVEL_OUTOF10: 0

## 2020-11-19 NOTE — PROGRESS NOTES
history  - CAD s/p placement of 2 stents, 3 years ago. Dr Maddy Theodore. - Pancreatitis last year. Uncertain cause. Had cholecystectomy prior.   - Gout    ROS:       positives in bold   Constitutional:  fever, chills, weakness, weight change, fatigue  Skin:  rash, pruritus, hair loss, bruising, dry skin, petechiae  Head, Face, Neck   headaches, swelling,  cervical adenopathy  Respiratory: shortness of breath, cough, or wheezing  Cardiovascular: chest pain, palpitations, dizzy, edema  Gastrointestinal: nausea, vomiting, diarrhea, constipation,belly pain    Yellow skin, blood in stool  Musculoskeletal:  back pain, muscle weakness, gait problems,       joint pain or swelling. Genitourinary:  dysuria, poor urine flow, flank pain, blood in urine  Neurologic:  vertigo, TIA'S, syncope, seizures, focal weakness  Psychosocial:  insomnia, anxiety, or depression. 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 She has never used smokeless tobacco. She reports that she does not drink alcohol.    family history is not on file.          Medication:     Current Facility-Administered Medications: heparin (porcine) injection 5,000 Units, 5,000 Units, Subcutaneous, 3 times per day  sodium chloride flush 0.9 % injection 10 mL, 10 mL, Intravenous, 2 times per day  sodium chloride flush 0.9 % injection 10 mL, 10 mL, Intravenous, PRN  acetaminophen (TYLENOL) tablet 650 mg, 650 mg, Oral, Q6H PRN **OR** acetaminophen (TYLENOL) suppository 650 mg, 650 mg, Rectal, Q6H PRN  magnesium hydroxide (MILK OF MAGNESIA) 400 MG/5ML suspension 30 mL, 30 mL, Oral, Daily PRN  promethazine (PHENERGAN) tablet 12.5 mg, 12.5 mg, Oral, Q6H PRN **OR** ondansetron (ZOFRAN) injection 4 mg, 4 mg, Intravenous, Q6H PRN  amLODIPine (NORVASC) tablet 5 mg, 5 mg, Oral, Daily  aspirin EC tablet 81 mg, 81 mg, Oral, Daily  ticagrelor (BRILINTA) tablet 60 mg, 60 mg, Oral, BID  metoprolol tartrate (LOPRESSOR) tablet 25 mg, 25 mg, Oral, BID  sertraline (ZOLOFT) tablet 50 mg, 50 mg, Oral, Daily  hydrALAZINE (APRESOLINE) tablet 50 mg, 50 mg, Oral, BID  vitamin D (CHOLECALCIFEROL) tablet 1,000 Units, 1,000 Units, Oral, Daily  sodium bicarbonate 50 mEq in sodium chloride 0.45 % 1,000 mL infusion, , Intravenous, Continuous  glucose (GLUTOSE) 40 % oral gel 15 g, 15 g, Oral, PRN  dextrose 50 % IV solution, 12.5 g, Intravenous, PRN  glucagon (rDNA) injection 1 mg, 1 mg, Intramuscular, PRN  dextrose 5 % solution, 100 mL/hr, Intravenous, PRN  insulin lispro (1 Unit Dial) 0-12 Units, 0-12 Units, Subcutaneous, TID WC  insulin lispro (1 Unit Dial) 0-6 Units, 0-6 Units, Subcutaneous, Nightly       Vitals :     Vitals:    11/19/20 0745   BP: 116/65   Pulse: 80   Resp: 18   Temp: 98.7 °F (37.1 °C)   SpO2: 94%       I & O :       Intake/Output Summary (Last 24 hours) at 11/19/2020 4030  Last data filed at 11/19/2020 0530  Gross per 24 hour   Intake 2391.91 ml   Output --   Net 2391.91 ml        Physical Examination :     General appearance: Anxious- no, distressed- no, in good spirits- yes  HEENT: Lips- normal, teeth- ok , oral mucosa- dry, cracked  Neck : Mass- no, appears symmetrical, JVD- not visible  Respiratory: Respiratory effort-  normal, wheeze- no, crackles - no  Cardiovascular:  Ausculation- No M/R/G, Edema no  Abdomen: visible mass- no, distention- no, scar- no, tenderness- no                            hepatosplenomegaly-  no  Musculoskeletal:  clubbing no,cyanosis- no , digital ischemia- no                           muscle strength- grossly normal , tone - grossly normal  Skin: rashes- no , ulcers- no, induration- no, tightening - no  Psychiatric:  Judgement and insight- normal           AAO X 3  Additional finding: skin is dry     LABS:-      Recent Labs     11/18/20  0541 11/19/20  0535   WBC 11.3* 9.9   HGB 12.6 11.8*   HCT 38.3 35.7*    404     Recent Labs     11/18/20  0541 11/19/20  0535   * 132*   K 4.9 3.7    102   CO2 15* 14*   BUN 58* 66*   CREATININE 4.8* 4.5*   GLUCOSE 84 [address] [facility] [address] Chase Molina PGY2  8:29 AM  11/19/2020    Will Discuss with Dr Chase Mloina    Patient was seen and examined and the case was discussed with the resident. He acted as my scribe. I agree with the assessment and plan.     Thanks  Nephrology  [address]  Office: [phone]  Cell: [phone]  Fax: [phone]

## 2020-11-19 NOTE — PROGRESS NOTES
Hospitalist Progress Note      PCP: Joni Hutchison    Chief Complaint. Presented to hospital for diarrhea, malaise    Date of Admission: 11/18/2020      Subjective: Patient mentions overall her diarrhea has improved, she had 3 bowel movements overnight, watery, nonbloody. Denies chest pain, nausea, vomiting, shortness of breath, fever or chills. mention feels overall better    Medications:  Reviewed    Infusion Medications    IV infusion builder 125 mL/hr at 11/19/20 0899    dextrose       Scheduled Medications    lactobacillus  1 capsule Oral Daily    heparin (porcine)  5,000 Units Subcutaneous 3 times per day    sodium chloride flush  10 mL Intravenous 2 times per day    amLODIPine  5 mg Oral Daily    aspirin EC  81 mg Oral Daily    ticagrelor  60 mg Oral BID    metoprolol tartrate  25 mg Oral BID    sertraline  50 mg Oral Daily    hydrALAZINE  50 mg Oral BID    Vitamin D  1,000 Units Oral Daily    insulin lispro  0-12 Units Subcutaneous TID WC    insulin lispro  0-6 Units Subcutaneous Nightly     PRN Meds: sodium chloride flush, acetaminophen **OR** acetaminophen, magnesium hydroxide, promethazine **OR** ondansetron, glucose, dextrose, glucagon (rDNA), dextrose      Intake/Output Summary (Last 24 hours) at 11/19/2020 1303  Last data filed at 11/19/2020 0712  Gross per 24 hour   Intake 2631.91 ml   Output --   Net 2631.91 ml       Physical Exam Performed:    BP (!) 113/49   Pulse 76   Temp 98.4 °F (36.9 °C) (Axillary)   Resp 18   Ht 5' (1.524 m)   Wt 235 lb (106.6 kg)   SpO2 96%   BMI 45.90 kg/m²     General appearance: No apparent distress   HEENT:  Conjunctivae/corneas clear. Neck: Supple, with full range of motion. Respiratory:  Normal respiratory effort. Clear to auscultation, bilaterally without Rales/Wheezes/Rhonchi.   Cardiovascular: Regular rate and rhythm with normal S1/S2 without murmurs or rubs  Abdomen: Soft, non-tender, non-distended, normal bowel sounds. Musculoskeletal: No cyanosis or edema bilaterally  Neurologic:  without any focal sensory/motor deficits. grossly non-focal.  Psychiatric: Alert and oriented, Normal mood  Peripheral Pulses: +2 palpable, equal bilaterally       Labs:   Recent Labs     11/18/20  0541 11/19/20  0535   WBC 11.3* 9.9   HGB 12.6 11.8*   HCT 38.3 35.7*    404     Recent Labs     11/18/20  0541 11/19/20  0535   * 132*   K 4.9 3.7    102   CO2 15* 14*   BUN 58* 66*   CREATININE 4.8* 4.5*   CALCIUM 9.5 9.1     No results for input(s): AST, ALT, BILIDIR, BILITOT, ALKPHOS in the last 72 hours. No results for input(s): INR in the last 72 hours. Recent Labs     11/18/20  0541 11/18/20  0936 11/18/20  1604   TROPONINI 0.06* 0.06* 0.05*       Urinalysis:      Lab Results   Component Value Date    NITRU Negative 11/18/2020    WBCUA 3-5 11/18/2020    BACTERIA 2+ 11/18/2020    RBCUA 0-2 11/18/2020    BLOODU SMALL 11/18/2020    SPECGRAV 1.025 11/18/2020    GLUCOSEU Negative 11/18/2020       Radiology:  XR CHEST PORTABLE   Final Result       Slight bibasilar opacities. Cardiomegaly. US RENAL COMPLETE    (Results Pending)         Assessment/Plan:    Active Hospital Problems    Diagnosis    COVID-19 [U07.1]       Patient is a 79-year-old female with past medical history of diabetes mellitus, hypertension who presents to the hospital for diarrhea. According to the patient she tested positive for Covid 19 infection last Sunday and she has been having diarrhea around 4-5 bowel movements every day, watery, nonbloody. Denies nausea vomiting. Patient also mentions she feels thirsty and feels like she has dizziness.   Patient denies fevers chills chest pain shortness of breath nausea vomiting dysuria.     Assessment  Viral gastroenteritis likely secondary to COVID-19  Acute kidney injury likely prerenal  Metabolic acidosis  Elevated troponin in the setting of CHRISTINE, no chest pain  Leukocytosis likely reactive  Hypertension  CAD s/p stenting 2016  Diabetes mellitus           Plan  Continue IV fluid therapy, monitor BMP, consult nephrology, creatinine slowly improving,  Insulin sliding scale  Started lactobacillus  Resume home amlodipine, hydralazine, metoprolol, ASA  DVT prophylaxis with heparin 5K units every 8hrs  Diet: DIET GENERAL; Carb Control: 4 carb choices (60 gms)/meal  Code Status: Full Code    PT/OT Eval Status: ordered    Dispo -likely discharge 2 to 3 days pending clinical improvement-diarrhea, CHRISTINE    Richy Huerta MD

## 2020-11-19 NOTE — PLAN OF CARE
Problem: Falls - Risk of:  Goal: Will remain free from falls  Description: Will remain free from falls  11/19/2020 1014 by Sarah Bermeo RN  Outcome: Met This Shift     Problem: Airway Clearance - Ineffective  Goal: Achieve or maintain patent airway  11/19/2020 1014 by Sarah Bermeo RN  Outcome: Met This Shift

## 2020-11-19 NOTE — PLAN OF CARE
Problem: Falls - Risk of:  Goal: Will remain free from falls  Description: Will remain free from falls  11/19/2020 0038 by Dexter Morel RN  Outcome: Ongoing  11/18/2020 1432 by Marisa Hathaway RN  Outcome: Met This Shift  Goal: Absence of physical injury  Description: Absence of physical injury  Outcome: Ongoing     Problem: Airway Clearance - Ineffective  Goal: Achieve or maintain patent airway  Outcome: Ongoing     Problem: Fluid Volume:  Goal: Ability to achieve a balanced intake and output will improve  Description: Ability to achieve a balanced intake and output will improve  Outcome: Ongoing

## 2020-11-19 NOTE — PROGRESS NOTES
PT doing well this shift. Stated she just feels very weak. PT was able to complete showering without nursing assistance. PT has denied pain this shift as well as other needs. Call llight is within reach.  Electronically signed by Heron Stewart RN on 11/19/2020 at 5:34 PM

## 2020-11-19 NOTE — PROGRESS NOTES
Physical Therapy    Facility/Department: [place] [id number] 4 PCU  Initial Assessment and treatment/discharge    NAME: Alta Valdes  : 1950  MRN: [id number]    Date of Service: 2020    Discharge Recommendations:    Alta Valdes scored a 22/24 on the AM-PAC short mobility form. At this time, no further PT is recommended upon discharge due to patient at baseline. Recommend patient returns to prior setting with prior services. PT Equipment Recommendations  Equipment Needed: No    Assessment   Assessment: Patient tolerated session well, transferring and ambulating in room and bathroom as above with fairly steady gait without an assistive device. Patient reports being up ad trini to bathroom. She reports mild weakness with ambulation but denies concerns regarding mobility upon discharge and denies need for home PT. Patient currently functioning near baseline level and is up ad trini in room. Encouraged ambulation and out of bed to chair as tolerated; she verbalized understanding. Will discharge from acute care PT at this time. Patient planning to d/c home with 24 hour assist from . Treatment Diagnosis: impaired mobility associated with COVID-19  Prognosis: Good  Decision Making: Low Complexity  PT Education: Goals;Transfer Training;PT Role;Functional Mobility Training;Plan of Care;General Safety;Gait Training  Patient Education: patient verbalized understanding. No Skilled PT: Safe to return home  REQUIRES PT FOLLOW UP: No  Activity Tolerance  Activity Tolerance: Patient Tolerated treatment well       Patient Diagnosis(es): The primary encounter diagnosis was NSTEMI (non-ST elevated myocardial infarction) ([facility] [address] Diagnoses of COVID-19 and Acute renal failure, unspecified acute renal failure type [facility]) were also pertinent to this visit. has a past medical history of CAD (coronary artery disease), CAD (coronary artery disease), and Essential hypertension.    has a past surgical history that WFL  Strength RLE  Strength RLE: WFL  Strength LLE  Strength LLE: WFL        Bed mobility  Supine to Sit: Modified independent(head of bed elevated)  Comment: patient sitting up in bedside chair at end of session  Transfers  Sit to Stand: Modified independent(from EOB and from toilet)  Stand to sit: Modified independent(to toilet and to bedside chair)  Ambulation  Ambulation?: Yes  Ambulation 1  Surface: level tile  Device: No Device  Assistance: Supervision  Quality of Gait: gait fairly steady with no overt loss of balance noted, wide base of support, decreased nola  Distance: 15' into bathroom, 40' in room returning to bedside chair  Stairs/Curb  Stairs?: No     Balance  Sitting - Static: Good  Standing - Static: Fair;+(supervision with UE support)  Standing - Dynamic: Fair;+(supervision to ambulate without an assistive device)        Plan   Plan  Times per week: d/c from acute care PT  Safety Devices  Type of devices: Left in chair, Call light within reach, Nurse notified(patient is up ad trini in room)    OutComes Score                                                  AM-PAC Score  AM-PAC Inpatient Mobility Raw Score : 22 (11/19/20 1537)  AM-PAC Inpatient T-Scale Score : 53.28 (11/19/20 1537)  Mobility Inpatient CMS 0-100% Score: 20.91 (11/19/20 1537)  Mobility Inpatient CMS G-Code Modifier : Yousuf Kee (11/19/20 1537)          Goals  Short term goals  Time Frame for Short term goals: none; patient will be discharged from acute care PT  Patient Goals   Patient goals : to go home       Therapy Time   Individual Concurrent Group Co-treatment   Time In 1410         Time Out 1436         Minutes 26         Timed Code Treatment Minutes: 11 Minutes    Timed Code Treatment Minutes:  11 Minutes    Total Treatment Minutes:    11 minutes treatment + 15 minutes evaluation = 26 total treatment minutes    [place], [place] [id number]

## 2020-11-19 NOTE — PROGRESS NOTES
Occupational Therapy   Occupational Therapy Initial Assessment /Treatment/Discharge  Date: 2020   Patient Name: Alessandro Reyes  MRN: [id number]     : 1950    Date of Service: 2020    Discharge Recommendations:    Alessandro Reyes scored a 23/24 on the [facility] ADL Inpatient form. At this time, no further OT is recommended upon discharge. Recommend patient returns to prior setting with prior services. OT Equipment Recommendations  Equipment Needed: No    Assessment   Assessment: Pt demonstrated good functional independence duing eval./  No OT needs identified. Pt expresses no concerns regarding discharge to home. Prognosis: Good  Decision Making: Low Complexity  OT Education: OT Role  Patient Education: Pt verbalized understanding  REQUIRES OT FOLLOW UP: No  Safety Devices  Safety Devices in place: Yes  Type of devices: Left in chair;Call light within reach;Nurse notified           Restrictions  Position Activity Restriction  Other position/activity restrictions: up as tolerated    Subjective   General  Chart Reviewed: Yes  Patient assessed for rehabilitation services?: Yes  Additional Pertinent Hx: Pt admitted 2020 with diarrhea. Pt is COVID-19 positive. NSTMI        PMH includes: CAD, henia repair, appy, essential HTN  Family / Caregiver Present: No  Referring Practitioner: Dr. Fer Ryan  Diagnosis: COVID-19  Subjective  Subjective: Pt in bed upon entry. Pt agreeable to activity.   Patient Currently in Pain: Denies  Vital Signs  Level of Consciousness: Alert  Patient Currently in Pain: Denies  Social/Functional History  Social/Functional History  Lives With: Spouse  Type of Home: (condo - 2nd level)  Home Layout: One level  Home Access: Elevator, Level entry  Bathroom Shower/Tub: Walk-in shower  Bathroom Toilet: Handicap height(sink nearby for leverage)  Bathroom Equipment: Grab bars in shower  Home Equipment: (none)  ADL Assistance: [address]: Independent(shares with )  Ambulation Assistance: Independent  Transfer Assistance: Independent  Active : Yes  Occupation: Retired  Type of occupation:   Additional Comments: patient denies history of falls at home       Objective   Vision: Within Functional Limits  Hearing: Within functional limits    Orientation  Overall Orientation Status: Within Functional Limits     Balance  Sitting Balance: Independent  Standing Balance: Supervision  Standing Balance  Time: ~3 min  Activity: bathroom mobility/ctivity, walk in room  Functional Mobility  Functional - Mobility Device: No device  Activity: To/from bathroom; Other  Assist Level: Supervision  Toilet Transfers  Toilet - Technique: Ambulating  Equipment Used: Standard toilet(grab ar)  Toilet Transfer: Modified independent  ADL  Feeding: Independent  Grooming: Independent  LE Dressing: Supervision  Toileting: Independent  Tone RUE  RUE Tone: Normotonic  Tone LUE  LUE Tone: Normotonic  Coordination  Movements Are Fluid And Coordinated: Yes     Bed mobility  Supine to Sit: Modified independent(head of bed elevated)  Transfers  Stand Step Transfers: Supervision  Sit to stand: Modified independent  Stand to sit: Modified independent     Cognition  Overall Cognitive Status: WFL                 LUE AROM (degrees)  LUE AROM : WFL  RUE AROM (degrees)  RUE AROM : WFL        Hand Dominance  Hand Dominance: Right         Treatment included ADL and transfer training.      Plan   Plan  Plan Comment: Discharge pt from acute OT    AM-PAC Score        AM-PAC Inpatient Daily Activity Raw Score: 23 (11/19/20 1545)  AM-PAC Inpatient ADL T-Scale Score : 51.12 (11/19/20 1545)  ADL Inpatient CMS 0-100% Score: 15.86 (11/19/20 1545)  ADL Inpatient CMS G-Code Modifier : CI (11/19/20 1545)    Goals    No goals indicated       Therapy Time   Individual Concurrent Group Co-treatment   Time In 1410         Time Out 1435         Minutes 25         Timed Code Treatment Minutes: 15 Minutes    Total Treatment Time:25    London Molina, OTR/L [id number]

## 2020-11-19 NOTE — PROGRESS NOTES
Nurse reviewed labs with pt. Nurse inquired about which family member to update on care. PT stated she has been updating them all and didn't need nurse to completed. Nurse stated understanding and stated if she changes her mind to let her know. PT stated thank you but she would rather complete herself.  Electronically signed by Oz Mello RN on 11/19/2020 at 5:30 PM

## 2020-11-20 LAB
ANION GAP SERPL CALCULATED.3IONS-SCNC: 13 MMOL/L (ref 3–16)
BUN BLDV-MCNC: 64 MG/DL (ref 7–20)
C DIFF TOXIN/ANTIGEN: NORMAL
CALCIUM SERPL-MCNC: 9 MG/DL (ref 8.3–10.6)
CHLORIDE BLD-SCNC: 97 MMOL/L (ref 99–110)
CO2: 23 MMOL/L (ref 21–32)
CREAT SERPL-MCNC: 4.1 MG/DL (ref 0.6–1.2)
GFR AFRICAN AMERICAN: 13
GFR NON-AFRICAN AMERICAN: 11
GLUCOSE BLD-MCNC: 130 MG/DL (ref 70–99)
GLUCOSE BLD-MCNC: 143 MG/DL (ref 70–99)
GLUCOSE BLD-MCNC: 154 MG/DL (ref 70–99)
GLUCOSE BLD-MCNC: 169 MG/DL (ref 70–99)
GLUCOSE BLD-MCNC: 176 MG/DL (ref 70–99)
GLUCOSE BLD-MCNC: 181 MG/DL (ref 70–99)
HCT VFR BLD CALC: 33.1 % (ref 36–48)
HEMOGLOBIN: 11.2 G/DL (ref 12–16)
MAGNESIUM: 1.6 MG/DL (ref 1.8–2.4)
MCH RBC QN AUTO: 31.2 PG (ref 26–34)
MCHC RBC AUTO-ENTMCNC: 33.8 G/DL (ref 31–36)
MCV RBC AUTO: 92.1 FL (ref 80–100)
PDW BLD-RTO: 14.4 % (ref 12.4–15.4)
PERFORMED ON: ABNORMAL
PLATELET # BLD: 405 K/UL (ref 135–450)
PMV BLD AUTO: 8 FL (ref 5–10.5)
POTASSIUM REFLEX MAGNESIUM: 3.4 MMOL/L (ref 3.5–5.1)
RBC # BLD: 3.6 M/UL (ref 4–5.2)
SODIUM BLD-SCNC: 133 MMOL/L (ref 136–145)
WBC # BLD: 8.5 K/UL (ref 4–11)

## 2020-11-20 PROCEDURE — 6370000000 HC RX 637 (ALT 250 FOR IP): Performed by: STUDENT IN AN ORGANIZED HEALTH CARE EDUCATION/TRAINING PROGRAM

## 2020-11-20 PROCEDURE — 2580000003 HC RX 258: Performed by: INTERNAL MEDICINE

## 2020-11-20 PROCEDURE — 6370000000 HC RX 637 (ALT 250 FOR IP): Performed by: INTERNAL MEDICINE

## 2020-11-20 PROCEDURE — 6360000002 HC RX W HCPCS: Performed by: INTERNAL MEDICINE

## 2020-11-20 PROCEDURE — 87449 NOS EACH ORGANISM AG IA: CPT

## 2020-11-20 PROCEDURE — 2500000003 HC RX 250 WO HCPCS: Performed by: INTERNAL MEDICINE

## 2020-11-20 PROCEDURE — 2060000000 HC ICU INTERMEDIATE R&B

## 2020-11-20 PROCEDURE — 80048 BASIC METABOLIC PNL TOTAL CA: CPT

## 2020-11-20 PROCEDURE — 87324 CLOSTRIDIUM AG IA: CPT

## 2020-11-20 PROCEDURE — 83735 ASSAY OF MAGNESIUM: CPT

## 2020-11-20 PROCEDURE — 85027 COMPLETE CBC AUTOMATED: CPT

## 2020-11-20 RX ORDER — 0.9 % SODIUM CHLORIDE 0.9 %
500 INTRAVENOUS SOLUTION INTRAVENOUS ONCE
Status: COMPLETED | OUTPATIENT
Start: 2020-11-20 | End: 2020-11-20

## 2020-11-20 RX ADMIN — Medication 1000 UNITS: at 09:14

## 2020-11-20 RX ADMIN — SODIUM BICARBONATE: 84 INJECTION, SOLUTION INTRAVENOUS at 09:16

## 2020-11-20 RX ADMIN — HEPARIN SODIUM 5000 UNITS: 5000 INJECTION INTRAVENOUS; SUBCUTANEOUS at 23:06

## 2020-11-20 RX ADMIN — SODIUM BICARBONATE: 84 INJECTION, SOLUTION INTRAVENOUS at 06:41

## 2020-11-20 RX ADMIN — HYDRALAZINE HYDROCHLORIDE 50 MG: 50 TABLET, FILM COATED ORAL at 20:20

## 2020-11-20 RX ADMIN — HEPARIN SODIUM 5000 UNITS: 5000 INJECTION INTRAVENOUS; SUBCUTANEOUS at 13:55

## 2020-11-20 RX ADMIN — TICAGRELOR 60 MG: 60 TABLET ORAL at 23:10

## 2020-11-20 RX ADMIN — INSULIN LISPRO 2 UNITS: 100 INJECTION, SOLUTION INTRAVENOUS; SUBCUTANEOUS at 13:55

## 2020-11-20 RX ADMIN — HYDRALAZINE HYDROCHLORIDE 50 MG: 50 TABLET, FILM COATED ORAL at 09:14

## 2020-11-20 RX ADMIN — SODIUM BICARBONATE: 84 INJECTION, SOLUTION INTRAVENOUS at 23:04

## 2020-11-20 RX ADMIN — Medication 1 CAPSULE: at 09:16

## 2020-11-20 RX ADMIN — ASPIRIN 81 MG: 81 TABLET, COATED ORAL at 09:13

## 2020-11-20 RX ADMIN — SODIUM CHLORIDE 500 ML: 9 INJECTION, SOLUTION INTRAVENOUS at 17:24

## 2020-11-20 RX ADMIN — SERTRALINE HYDROCHLORIDE 50 MG: 50 TABLET ORAL at 09:14

## 2020-11-20 RX ADMIN — INSULIN LISPRO 2 UNITS: 100 INJECTION, SOLUTION INTRAVENOUS; SUBCUTANEOUS at 09:19

## 2020-11-20 RX ADMIN — INSULIN LISPRO 1 UNITS: 100 INJECTION, SOLUTION INTRAVENOUS; SUBCUTANEOUS at 23:04

## 2020-11-20 RX ADMIN — TICAGRELOR 60 MG: 60 TABLET ORAL at 09:15

## 2020-11-20 RX ADMIN — HEPARIN SODIUM 5000 UNITS: 5000 INJECTION INTRAVENOUS; SUBCUTANEOUS at 05:31

## 2020-11-20 RX ADMIN — ACETAMINOPHEN 650 MG: 325 TABLET ORAL at 11:52

## 2020-11-20 RX ADMIN — METOPROLOL TARTRATE 25 MG: 25 TABLET, FILM COATED ORAL at 09:14

## 2020-11-20 RX ADMIN — METOPROLOL TARTRATE 25 MG: 25 TABLET, FILM COATED ORAL at 20:20

## 2020-11-20 ASSESSMENT — PAIN DESCRIPTION - ONSET: ONSET: GRADUAL

## 2020-11-20 ASSESSMENT — PAIN DESCRIPTION - DESCRIPTORS: DESCRIPTORS: HEADACHE

## 2020-11-20 ASSESSMENT — PAIN DESCRIPTION - FREQUENCY: FREQUENCY: INTERMITTENT

## 2020-11-20 ASSESSMENT — PAIN DESCRIPTION - LOCATION: LOCATION: HEAD

## 2020-11-20 ASSESSMENT — PAIN SCALES - GENERAL
PAINLEVEL_OUTOF10: 0
PAINLEVEL_OUTOF10: 7
PAINLEVEL_OUTOF10: 0
PAINLEVEL_OUTOF10: 0

## 2020-11-20 ASSESSMENT — PAIN - FUNCTIONAL ASSESSMENT: PAIN_FUNCTIONAL_ASSESSMENT: ACTIVITIES ARE NOT PREVENTED

## 2020-11-20 ASSESSMENT — PAIN DESCRIPTION - PAIN TYPE: TYPE: ACUTE PAIN

## 2020-11-20 NOTE — PROGRESS NOTES
Hospitalist Progress Note      PCP: Leonel Peacock    Chief Complaint. Presented to hospital for diarrhea, malaise    Date of Admission: 11/18/2020      Subjective: Patient mentions her diarrhea has improved, she had 2 watery bowel movements since morning. Otherwise denies nausea vomiting. Medications:  Reviewed    Infusion Medications    IV infusion builder 125 mL/hr at 11/20/20 0916    dextrose       Scheduled Medications    lactobacillus  1 capsule Oral Daily    heparin (porcine)  5,000 Units Subcutaneous 3 times per day    sodium chloride flush  10 mL Intravenous 2 times per day    aspirin EC  81 mg Oral Daily    ticagrelor  60 mg Oral BID    metoprolol tartrate  25 mg Oral BID    sertraline  50 mg Oral Daily    hydrALAZINE  50 mg Oral BID    Vitamin D  1,000 Units Oral Daily    insulin lispro  0-12 Units Subcutaneous TID WC    insulin lispro  0-6 Units Subcutaneous Nightly     PRN Meds: sodium chloride flush, acetaminophen **OR** acetaminophen, magnesium hydroxide, promethazine **OR** ondansetron, glucose, dextrose, glucagon (rDNA), dextrose      Intake/Output Summary (Last 24 hours) at 11/20/2020 1436  Last data filed at 11/20/2020 1401  Gross per 24 hour   Intake 3140 ml   Output 700 ml   Net 2440 ml       Physical Exam Performed:    BP (!) 157/77   Pulse 69   Temp 98.7 °F (37.1 °C) (Oral)   Resp 15   Ht 5' (1.524 m)   Wt 235 lb (106.6 kg)   SpO2 93%   BMI 45.90 kg/m²     General appearance: No apparent distress   HEENT:  Conjunctivae/corneas clear. Neck: Supple, with full range of motion. Respiratory:  Normal respiratory effort. Clear to auscultation, bilaterally without Rales/Wheezes/Rhonchi. Cardiovascular: Regular rate and rhythm with normal S1/S2 without murmurs or rubs  Abdomen: Soft, non-tender, non-distended, normal bowel sounds. Musculoskeletal: No cyanosis or edema bilaterally  Neurologic:  without any focal sensory/motor deficits.  grossly non-focal.  Psychiatric: Alert and oriented, Normal mood  Peripheral Pulses: +2 palpable, equal bilaterally       Labs:   Recent Labs     11/18/20  0541 11/19/20  0535 11/20/20  0530   WBC 11.3* 9.9 8.5   HGB 12.6 11.8* 11.2*   HCT 38.3 35.7* 33.1*    404 405     Recent Labs     11/18/20  0541 11/19/20  0535 11/20/20  0530   * 132* 133*   K 4.9 3.7 3.4*    102 97*   CO2 15* 14* 23   BUN 58* 66* 64*   CREATININE 4.8* 4.5* 4.1*   CALCIUM 9.5 9.1 9.0     No results for input(s): AST, ALT, BILIDIR, BILITOT, ALKPHOS in the last 72 hours. No results for input(s): INR in the last 72 hours. Recent Labs     11/18/20  0541 11/18/20  0936 11/18/20  1604   TROPONINI 0.06* 0.06* 0.05*       Urinalysis:      Lab Results   Component Value Date    NITRU Negative 11/18/2020    WBCUA 3-5 11/18/2020    BACTERIA 2+ 11/18/2020    RBCUA 0-2 11/18/2020    BLOODU SMALL 11/18/2020    SPECGRAV 1.025 11/18/2020    GLUCOSEU Negative 11/18/2020       Radiology:  US RENAL COMPLETE   Final Result      No hydronephrosis      If concern for renal or ureteral calculi CT abdomen and pelvis without contrast recommended. XR CHEST PORTABLE   Final Result       Slight bibasilar opacities. Cardiomegaly. Assessment/Plan:    Active Hospital Problems    Diagnosis    COVID-19 [U07.1]       Patient is a 77-year-old female with past medical history of diabetes mellitus, hypertension who presents to the hospital for diarrhea. According to the patient she tested positive for Covid 19 infection last Sunday and she has been having diarrhea around 4-5 bowel movements every day, watery, nonbloody. Denies nausea vomiting. Patient also mentions she feels thirsty and feels like she has dizziness.   Patient denies fevers chills chest pain shortness of breath nausea vomiting dysuria.     Assessment  Viral gastroenteritis likely secondary to COVID-19  Acute kidney injury likely prerenal  Metabolic acidosis  Elevated troponin in the

## 2020-11-20 NOTE — PROGRESS NOTES
Am assessment charted. /62   Pulse 75   Temp 98.7 °F (37.1 °C) (Oral)   Resp 16   Ht 5' (1.524 m)   Wt 235 lb (106.6 kg)   SpO2 94%   BMI 45.90 kg/m²   Pt denies any pain or Sob. Updated family member via phone at bedside.

## 2020-11-20 NOTE — PROGRESS NOTES
[facility]    [facility]              [phone]                      Roxanna Cervantes is admitted with CHRISTINE. We are following for CHRISTINE    Interval History and plan:          Urine suggestive of prerenal etiology  Creatinine is slowly improving . 4.8 > 4.5> 4.1  Bicarbonate better  Renal US is bilateral normal sized kidneys   Change IVF  Stop Amlodipine  Strict I/O  Avoid ACE/ARB, NSAID, IV contrast and other nephrotoxic meds                   Assessment :     Dehydration  - d/t diarrhea and poor PO intake. - continue fluids, monitor UOP     CHRISTINE  - suspect primarily due to dehydration  - is possibly AIN from allopurinol. Check urine eosinophils  - no prior history of kidney disease  - reports taking lisinopril   - is COVID +, secondary renal injury from [place] is a possibility  - has 1 gram / day of proteinuria    NAGMA + AGMA  - primarily NAGMA from diarrhea  - give bicarb in fluids    NSTEMI  - management per primary      CC/reason for consult :     CHRISTINE     HPI :     Roxanna Cervantes is a 79 y.o. female presented to the hospital on 11/18/2020 with dizziness. She tested positive for COVID 19 2 weeks ago. At first was not feeling any symptoms but did eventually notice fatigue, diarrhea, and dizziness. The diarrhea has been up to 5 times per day, is watery without blood or mucus. Has not had fever. Has had poor PO intake. Has not noticed any changes in her urine, either in the color, frequency, amount, or associated pain. Does not smoke or drink alcohol. Has lost about 13 pounds in 1 month. She thinks allopurinol is cause of diarrhea because her dose was recently increased when symptoms began (from 200mg to 300mg). She also takes an acute relief medication but is unable to recall which medication it is (possibly medrol). She says that she takes 6-8 pills per day of this med when needed. Last used in October. Denies taking over the counter pain relief.      Medical history  - CAD s/p placement injection 10 mL, 10 mL, Intravenous, PRN  acetaminophen (TYLENOL) tablet 650 mg, 650 mg, Oral, Q6H PRN **OR** acetaminophen (TYLENOL) suppository 650 mg, 650 mg, Rectal, Q6H PRN  magnesium hydroxide (MILK OF MAGNESIA) 400 MG/5ML suspension 30 mL, 30 mL, Oral, Daily PRN  promethazine (PHENERGAN) tablet 12.5 mg, 12.5 mg, Oral, Q6H PRN **OR** ondansetron (ZOFRAN) injection 4 mg, 4 mg, Intravenous, Q6H PRN  amLODIPine (NORVASC) tablet 5 mg, 5 mg, Oral, Daily  aspirin EC tablet 81 mg, 81 mg, Oral, Daily  ticagrelor (BRILINTA) tablet 60 mg, 60 mg, Oral, BID  metoprolol tartrate (LOPRESSOR) tablet 25 mg, 25 mg, Oral, BID  sertraline (ZOLOFT) tablet 50 mg, 50 mg, Oral, Daily  hydrALAZINE (APRESOLINE) tablet 50 mg, 50 mg, Oral, BID  vitamin D (CHOLECALCIFEROL) tablet 1,000 Units, 1,000 Units, Oral, Daily  glucose (GLUTOSE) 40 % oral gel 15 g, 15 g, Oral, PRN  dextrose 50 % IV solution, 12.5 g, Intravenous, PRN  glucagon (rDNA) injection 1 mg, 1 mg, Intramuscular, PRN  dextrose 5 % solution, 100 mL/hr, Intravenous, PRN  insulin lispro (1 Unit Dial) 0-12 Units, 0-12 Units, Subcutaneous, TID WC  insulin lispro (1 Unit Dial) 0-6 Units, 0-6 Units, Subcutaneous, Nightly       Vitals :     Vitals:    11/20/20 0342   BP: 133/61   Pulse: 74   Resp: 16   Temp: 98.9 °F (37.2 °C)   SpO2: 95%       I & O :       Intake/Output Summary (Last 24 hours) at 11/20/2020 5180  Last data filed at 11/20/2020 0600  Gross per 24 hour   Intake 3380 ml   Output 400 ml   Net 2980 ml        Physical Examination :     General appearance: Anxious- no, distressed- no, in good spirits- yes  HEENT: Lips- normal, teeth- ok , oral mucosa- dry, cracked  Neck : Mass- no, appears symmetrical, JVD- not visible  Respiratory: Respiratory effort-  normal, wheeze- no, crackles - no  Cardiovascular:  Ausculation- No M/R/G, Edema no  Abdomen: visible mass- no, distention- no, scar- no, tenderness- no                            hepatosplenomegaly-  no  Musculoskeletal: clubbing no,cyanosis- no , digital ischemia- no                           muscle strength- grossly normal , tone - grossly normal  Skin: rashes- no , ulcers- no, induration- no, tightening - no  Psychiatric:  Judgement and insight- normal           AAO X 3  Additional finding: skin is dry     LABS:-      Recent Labs     11/18/20  0541 11/19/20  0535 11/20/20  0530   WBC 11.3* 9.9 8.5   HGB 12.6 11.8* 11.2*   HCT 38.3 35.7* 33.1*    404 405     Recent Labs     11/18/20  0541 11/19/20  0535 11/20/20  0530   * 132* 133*   K 4.9 3.7 3.4*    102 97*   CO2 15* 14* 23   BUN 58* 66* 64*   CREATININE 4.8* 4.5* 4.1*   GLUCOSE 84 92 176*   MG  --   --  1.60*      Gillian Field  7:22 AM  11/20/2020        Thanks  Nephrology  [address] [facility], [address] [place] [address]  Office: [phone]  Cell: [phone]  Fax: [phone]

## 2020-11-20 NOTE — PLAN OF CARE
Problem: Falls - Risk of:  Goal: Will remain free from falls  Description: Will remain free from falls  Outcome: Ongoing  Note: Medium risk per LOCKETT, up ad trini, steady on feet. Will update as needed. Problem: Airway Clearance - Ineffective  Goal: Achieve or maintain patent airway  Outcome: Ongoing  Note: SpO2 >95% on Room air. No dyspnea with exertion, is feeling fatigued. Will update as needed.

## 2020-11-21 LAB
ANION GAP SERPL CALCULATED.3IONS-SCNC: 12 MMOL/L (ref 3–16)
BUN BLDV-MCNC: 53 MG/DL (ref 7–20)
CALCIUM SERPL-MCNC: 8.6 MG/DL (ref 8.3–10.6)
CHLORIDE BLD-SCNC: 99 MMOL/L (ref 99–110)
CO2: 26 MMOL/L (ref 21–32)
CREAT SERPL-MCNC: 3.4 MG/DL (ref 0.6–1.2)
GFR AFRICAN AMERICAN: 16
GFR NON-AFRICAN AMERICAN: 13
GLUCOSE BLD-MCNC: 108 MG/DL (ref 70–99)
GLUCOSE BLD-MCNC: 114 MG/DL (ref 70–99)
GLUCOSE BLD-MCNC: 122 MG/DL (ref 70–99)
GLUCOSE BLD-MCNC: 149 MG/DL (ref 70–99)
GLUCOSE BLD-MCNC: 174 MG/DL (ref 70–99)
HCT VFR BLD CALC: 32.3 % (ref 36–48)
HEMOGLOBIN: 10.9 G/DL (ref 12–16)
MAGNESIUM: 1.6 MG/DL (ref 1.8–2.4)
MCH RBC QN AUTO: 31.1 PG (ref 26–34)
MCHC RBC AUTO-ENTMCNC: 33.7 G/DL (ref 31–36)
MCV RBC AUTO: 92.4 FL (ref 80–100)
PDW BLD-RTO: 14.5 % (ref 12.4–15.4)
PERFORMED ON: ABNORMAL
PLATELET # BLD: 410 K/UL (ref 135–450)
PMV BLD AUTO: 7.7 FL (ref 5–10.5)
POTASSIUM REFLEX MAGNESIUM: 3.5 MMOL/L (ref 3.5–5.1)
RBC # BLD: 3.5 M/UL (ref 4–5.2)
SODIUM BLD-SCNC: 137 MMOL/L (ref 136–145)
WBC # BLD: 8 K/UL (ref 4–11)

## 2020-11-21 PROCEDURE — 6370000000 HC RX 637 (ALT 250 FOR IP): Performed by: STUDENT IN AN ORGANIZED HEALTH CARE EDUCATION/TRAINING PROGRAM

## 2020-11-21 PROCEDURE — 2580000003 HC RX 258: Performed by: INTERNAL MEDICINE

## 2020-11-21 PROCEDURE — 83735 ASSAY OF MAGNESIUM: CPT

## 2020-11-21 PROCEDURE — 6370000000 HC RX 637 (ALT 250 FOR IP): Performed by: INTERNAL MEDICINE

## 2020-11-21 PROCEDURE — 2500000003 HC RX 250 WO HCPCS: Performed by: INTERNAL MEDICINE

## 2020-11-21 PROCEDURE — 80048 BASIC METABOLIC PNL TOTAL CA: CPT

## 2020-11-21 PROCEDURE — 2060000000 HC ICU INTERMEDIATE R&B

## 2020-11-21 PROCEDURE — 6360000002 HC RX W HCPCS: Performed by: INTERNAL MEDICINE

## 2020-11-21 PROCEDURE — 85027 COMPLETE CBC AUTOMATED: CPT

## 2020-11-21 RX ORDER — SODIUM CHLORIDE 9 MG/ML
INJECTION, SOLUTION INTRAVENOUS CONTINUOUS
Status: DISCONTINUED | OUTPATIENT
Start: 2020-11-21 | End: 2020-11-24 | Stop reason: HOSPADM

## 2020-11-21 RX ORDER — LANOLIN ALCOHOL/MO/W.PET/CERES
800 CREAM (GRAM) TOPICAL ONCE
Status: COMPLETED | OUTPATIENT
Start: 2020-11-21 | End: 2020-11-21

## 2020-11-21 RX ADMIN — METOPROLOL TARTRATE 25 MG: 25 TABLET, FILM COATED ORAL at 20:51

## 2020-11-21 RX ADMIN — TICAGRELOR 60 MG: 60 TABLET ORAL at 10:23

## 2020-11-21 RX ADMIN — TICAGRELOR 60 MG: 60 TABLET ORAL at 20:51

## 2020-11-21 RX ADMIN — ASPIRIN 81 MG: 81 TABLET, COATED ORAL at 10:22

## 2020-11-21 RX ADMIN — Medication 1000 UNITS: at 10:22

## 2020-11-21 RX ADMIN — ACETAMINOPHEN 650 MG: 325 TABLET ORAL at 05:14

## 2020-11-21 RX ADMIN — METOPROLOL TARTRATE 25 MG: 25 TABLET, FILM COATED ORAL at 10:22

## 2020-11-21 RX ADMIN — HEPARIN SODIUM 5000 UNITS: 5000 INJECTION INTRAVENOUS; SUBCUTANEOUS at 13:39

## 2020-11-21 RX ADMIN — INSULIN LISPRO 2 UNITS: 100 INJECTION, SOLUTION INTRAVENOUS; SUBCUTANEOUS at 18:43

## 2020-11-21 RX ADMIN — HEPARIN SODIUM 5000 UNITS: 5000 INJECTION INTRAVENOUS; SUBCUTANEOUS at 23:27

## 2020-11-21 RX ADMIN — SERTRALINE HYDROCHLORIDE 50 MG: 50 TABLET ORAL at 10:22

## 2020-11-21 RX ADMIN — SODIUM BICARBONATE: 84 INJECTION, SOLUTION INTRAVENOUS at 07:58

## 2020-11-21 RX ADMIN — SODIUM CHLORIDE: 9 INJECTION, SOLUTION INTRAVENOUS at 16:15

## 2020-11-21 RX ADMIN — HYDRALAZINE HYDROCHLORIDE 50 MG: 50 TABLET, FILM COATED ORAL at 10:22

## 2020-11-21 RX ADMIN — INSULIN LISPRO 2 UNITS: 100 INJECTION, SOLUTION INTRAVENOUS; SUBCUTANEOUS at 13:39

## 2020-11-21 RX ADMIN — Medication 800 MG: at 10:22

## 2020-11-21 RX ADMIN — Medication 1 CAPSULE: at 10:22

## 2020-11-21 RX ADMIN — HYDRALAZINE HYDROCHLORIDE 50 MG: 50 TABLET, FILM COATED ORAL at 20:51

## 2020-11-21 RX ADMIN — HEPARIN SODIUM 5000 UNITS: 5000 INJECTION INTRAVENOUS; SUBCUTANEOUS at 05:07

## 2020-11-21 ASSESSMENT — PAIN SCALES - GENERAL
PAINLEVEL_OUTOF10: 0
PAINLEVEL_OUTOF10: 3
PAINLEVEL_OUTOF10: 4
PAINLEVEL_OUTOF10: 0
PAINLEVEL_OUTOF10: 0
PAINLEVEL_OUTOF10: 3
PAINLEVEL_OUTOF10: 3

## 2020-11-21 ASSESSMENT — PAIN DESCRIPTION - ORIENTATION
ORIENTATION: RIGHT;LEFT

## 2020-11-21 ASSESSMENT — PAIN DESCRIPTION - LOCATION
LOCATION: ANKLE

## 2020-11-21 ASSESSMENT — PAIN DESCRIPTION - PAIN TYPE
TYPE: ACUTE PAIN

## 2020-11-21 NOTE — PROGRESS NOTES
[facility]    [facility]              [phone]                      Haydee Gandhi is admitted with CHRISTINE. We are following for CHRISTINE    Interval History and plan:      Urine suggestive of prerenal etiology  SCr improving now 3.4 and bicarb 26 so continue IVF for now but remove bicarb and place on NS at 75 cc/hr. Renal US is bilateral normal sized kidneys   BP stable 133/78 off Amlodipine  Strict I/O  Avoid ACE/ARB, NSAID, IV contrast and other nephrotoxic meds                   Assessment :     Dehydration  - d/t diarrhea and poor PO intake. - continue fluids, monitor UOP     CHRISTINE  - suspect primarily due to dehydration  - is possibly AIN from allopurinol. Check urine eosinophils  - no prior history of kidney disease  - reports taking lisinopril   - is COVID +, secondary renal injury from [place] is a possibility  - has 1 gram / day of proteinuria    NAGMA + AGMA  - primarily NAGMA from diarrhea  - give bicarb in fluids    NSTEMI  - management per primary      CC/reason for consult :     CHRISTINE     HPI :     Haydee Gandhi is a 79 y.o. female presented to the hospital on 11/18/2020 with dizziness. She tested positive for COVID 19 2 weeks ago. At first was not feeling any symptoms but did eventually notice fatigue, diarrhea, and dizziness. The diarrhea has been up to 5 times per day, is watery without blood or mucus. Has not had fever. Has had poor PO intake. Has not noticed any changes in her urine, either in the color, frequency, amount, or associated pain. Does not smoke or drink alcohol. Has lost about 13 pounds in 1 month. She thinks allopurinol is cause of diarrhea because her dose was recently increased when symptoms began (from 200mg to 300mg). She also takes an acute relief medication but is unable to recall which medication it is (possibly medrol). She says that she takes 6-8 pills per day of this med when needed. Last used in October. Denies taking over the counter pain relief. Medical history  - CAD s/p placement of 2 stents, 3 years ago. Dr Laura Greenwood. - Pancreatitis last year. Uncertain cause. Had cholecystectomy prior.   - Gout    ROS:       positives in bold   Constitutional:  fever, chills, weakness, weight change, fatigue  Skin:  rash, pruritus, hair loss, bruising, dry skin, petechiae  Head, Face, Neck   headaches, swelling,  cervical adenopathy  Respiratory: shortness of breath, cough, or wheezing  Cardiovascular: chest pain, palpitations, dizzy, edema  Gastrointestinal: nausea, vomiting, diarrhea, constipation,belly pain    Yellow skin, blood in stool  Musculoskeletal:  back pain, muscle weakness, gait problems,       joint pain or swelling. Genitourinary:  dysuria, poor urine flow, flank pain, blood in urine  Neurologic:  vertigo, TIA'S, syncope, seizures, focal weakness  Psychosocial:  insomnia, anxiety, or depression. 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 She has never used smokeless tobacco. She reports that she does not drink alcohol.    family history is not on file.          Medication:     Current Facility-Administered Medications: sodium bicarbonate 50 mEq in sodium chloride 0.45 % 1,000 mL infusion, , Intravenous, Continuous  lactobacillus (CULTURELLE) capsule 1 capsule, 1 capsule, Oral, Daily  heparin (porcine) injection 5,000 Units, 5,000 Units, Subcutaneous, 3 times per day  sodium chloride flush 0.9 % injection 10 mL, 10 mL, Intravenous, 2 times per day  sodium chloride flush 0.9 % injection 10 mL, 10 mL, Intravenous, PRN  acetaminophen (TYLENOL) tablet 650 mg, 650 mg, Oral, Q6H PRN **OR** acetaminophen (TYLENOL) suppository 650 mg, 650 mg, Rectal, Q6H PRN  magnesium hydroxide (MILK OF MAGNESIA) 400 MG/5ML suspension 30 mL, 30 mL, Oral, Daily PRN  promethazine (PHENERGAN) tablet 12.5 mg, 12.5 mg, Oral, Q6H PRN **OR** ondansetron (ZOFRAN) injection 4 mg, 4 mg, Intravenous, Q6H PRN  aspirin EC tablet 81 mg, 81 mg, Oral, Daily  ticagrelor (BRILINTA) tablet 60 mg, 60 mg, Oral, BID  metoprolol tartrate (LOPRESSOR) tablet 25 mg, 25 mg, Oral, BID  sertraline (ZOLOFT) tablet 50 mg, 50 mg, Oral, Daily  hydrALAZINE (APRESOLINE) tablet 50 mg, 50 mg, Oral, BID  vitamin D (CHOLECALCIFEROL) tablet 1,000 Units, 1,000 Units, Oral, Daily  glucose (GLUTOSE) 40 % oral gel 15 g, 15 g, Oral, PRN  dextrose 50 % IV solution, 12.5 g, Intravenous, PRN  glucagon (rDNA) injection 1 mg, 1 mg, Intramuscular, PRN  dextrose 5 % solution, 100 mL/hr, Intravenous, PRN  insulin lispro (1 Unit Dial) 0-12 Units, 0-12 Units, Subcutaneous, TID WC  insulin lispro (1 Unit Dial) 0-6 Units, 0-6 Units, Subcutaneous, Nightly       Vitals :     Vitals:    11/21/20 1212   BP: 133/78   Pulse: 72   Resp: 18   Temp: 97.4 °F (36.3 °C)   SpO2: 93%       I & O :       Intake/Output Summary (Last 24 hours) at 11/21/2020 1515  Last data filed at 11/21/2020 1343  Gross per 24 hour   Intake 2983 ml   Output 100 ml   Net 2883 ml        Physical Examination :     General appearance: Anxious- no, distressed- no, in good spirits- yes  HEENT: Lips- normal, teeth- ok , oral mucosa- dry, cracked  Neck : Mass- no, appears symmetrical, JVD- not visible  Respiratory: Respiratory effort-  normal, wheeze- no, crackles - no  Cardiovascular:  Ausculation- No M/R/G, Edema no  Abdomen: visible mass- no, distention- no, scar- no, tenderness- no                            hepatosplenomegaly-  no  Musculoskeletal:  clubbing no,cyanosis- no , digital ischemia- no                           muscle strength- grossly normal , tone - grossly normal  Skin: rashes- no , ulcers- no, induration- no, tightening - no  Psychiatric:  Judgement and insight- normal           AAO X 3  Additional finding: skin is dry     LABS:-      Recent Labs     11/19/20  0535 11/20/20  0530 11/21/20  0554   WBC 9.9 8.5 8.0   HGB 11.8* 11.2* 10.9*   HCT 35.7* 33.1* 32.3*    405 410     Recent Labs     11/19/20  0535 11/20/20  0530 11/21/20  0554   * 133* 137   K 3.7 3.4* 3.5    97* 99   CO2 14* 23 26   BUN 66* 64* 53*   CREATININE 4.5* 4.1* 3.4*   GLUCOSE 92 176* 122*   MG  --  1.60* 1.60*      Jose C Slater  3:15 PM  11/21/2020        Thanks  Nephrology  [address] [facility], [address] [place] [address]  Office: [phone]  Cell: [phone]  Fax: [phone]

## 2020-11-21 NOTE — PLAN OF CARE
Problem: Falls - Risk of:  Goal: Will remain free from falls  Description: Will remain free from falls  11/21/2020 1429 by Vini Manuel RN  Outcome: Ongoing     Problem: Airway Clearance - Ineffective  Goal: Achieve or maintain patent airway  11/21/2020 1429 by Vini Manuel RN  Note: RR 28 deep and regular  lungs diminished with auscultation  patient does become dyspnic with exertion at times  room air pulse ox mid 90's  continue monitor and assess     Problem: Fluid Volume:  Goal: Ability to achieve a balanced intake and output will improve  Description: Ability to achieve a balanced intake and output will improve  Outcome: Ongoing  Note: IVF infusing per order  patient reports 2 loose bm today  patient voiding without difficulty but refusing collection hat to monitor output     Problem: Pain:  Description: Pain management should include both nonpharmacologic and pharmacologic interventions.   Goal: Control of acute pain  Description: Control of acute pain  11/21/2020 1429 by Vini Manuel RN  Note: Patient reports bilat ankle pain related to gout  patient taking prn tylenol per order  reports little or no pain reduction  but does not feel need to request stronger pain medicine   continue assess pain

## 2020-11-21 NOTE — PLAN OF CARE
Problem: Falls - Risk of:  Goal: Will remain free from falls  Description: Will remain free from falls  11/21/2020 0109 by Rasheed Millard RN  Outcome: Ongoing  Note: Pt up in room ad trini, steady on feet, no dizziness/lightheadedness. Medium risk per LOCKETT. Will update as needed. Problem: Airway Clearance - Ineffective  Goal: Achieve or maintain patent airway  Outcome: Ongoing  Note: SpO2 >95% on room air, no dyspnea with exertion. Will update as needed. Problem: Pain:  Goal: Pain level will decrease  Description: Pain level will decrease  11/21/2020 0109 by Rasheed Millard RN  Outcome: Ongoing     Problem: Pain:  Goal: Control of acute pain  Description: Control of acute pain  Outcome: Ongoing  Note: Denies pain at this time. Will update as needed.

## 2020-11-21 NOTE — PROGRESS NOTES
Hospitalist Progress Note      PCP: Robert Fung    Date of Admission: 11/18/2020    Chief Complaint: diarrhea, malaise    Subjective:  Patient seen and examined at the bedside. She is feeling well. No acute events overnight. Diarrhea has improved. No other symptoms. Cr improved to 3.5 from 4.5. PFHS: reviewed as documented 11/18/2020, no changes    Medications:  Reviewed    Infusion Medications    IV infusion builder 125 mL/hr at 11/21/20 0758    dextrose       Scheduled Medications    magnesium oxide  800 mg Oral Once    lactobacillus  1 capsule Oral Daily    heparin (porcine)  5,000 Units Subcutaneous 3 times per day    sodium chloride flush  10 mL Intravenous 2 times per day    aspirin EC  81 mg Oral Daily    ticagrelor  60 mg Oral BID    metoprolol tartrate  25 mg Oral BID    sertraline  50 mg Oral Daily    hydrALAZINE  50 mg Oral BID    Vitamin D  1,000 Units Oral Daily    insulin lispro  0-12 Units Subcutaneous TID WC    insulin lispro  0-6 Units Subcutaneous Nightly     PRN Meds: sodium chloride flush, acetaminophen **OR** acetaminophen, magnesium hydroxide, promethazine **OR** ondansetron, glucose, dextrose, glucagon (rDNA), dextrose      Intake/Output Summary (Last 24 hours) at 11/21/2020 0903  Last data filed at 11/21/2020 0516  Gross per 24 hour   Intake 2743 ml   Output 400 ml   Net 2343 ml         Physical Exam Performed:    /75   Pulse 73   Temp 97.7 °F (36.5 °C) (Oral)   Resp 18   Ht 5' (1.524 m)   Wt 229 lb 9.6 oz (104.1 kg)   SpO2 95%   BMI 44.84 kg/m²     General appearance:  No apparent distress, appears stated age  Eyes: Pupils equal, round, reactive to light, conjunctiva/corneas clear  Ears/Nose/Mouth/Throat: No external lesions or scars, hearing intact to voice  Neck: Trachea midline, no masses noted  Respiratory:  Normal respiratory effort.  Clear to auscultation bilaterally  Cardiovascular: Regular rate and rhythm, nl S1/S2, w/o murmurs, rubs or gallops, no lower extremity edema  Abdomen: Soft, non-tender, non-distended, no hepatosplenomegaly  Musculoskeletal: No cyanosis, clubbing or petechiae, no lower extremity misalignment, asymmetry, or crepitation  Skin: Normal skin color, texture, turgor. No rashes or lesions noted. Psychiatric: Alert and oriented x4, good insight and judgment    Labs:   Recent Labs     11/19/20  0535 11/20/20  0530 11/21/20  0554   WBC 9.9 8.5 8.0   HGB 11.8* 11.2* 10.9*   HCT 35.7* 33.1* 32.3*    405 410     Recent Labs     11/19/20  0535 11/20/20  0530 11/21/20  0554   * 133* 137   K 3.7 3.4* 3.5    97* 99   CO2 14* 23 26   BUN 66* 64* 53*   CREATININE 4.5* 4.1* 3.4*   CALCIUM 9.1 9.0 8.6     No results for input(s): AST, ALT, BILIDIR, BILITOT, ALKPHOS in the last 72 hours. No results for input(s): INR in the last 72 hours. Recent Labs     11/18/20  0936 11/18/20  1604   TROPONINI 0.06* 0.05*       Urinalysis:      Lab Results   Component Value Date    NITRU Negative 11/18/2020    WBCUA 3-5 11/18/2020    BACTERIA 2+ 11/18/2020    RBCUA 0-2 11/18/2020    BLOODU SMALL 11/18/2020    SPECGRAV 1.025 11/18/2020    GLUCOSEU Negative 11/18/2020       Radiology:  US RENAL COMPLETE   Final Result      No hydronephrosis      If concern for renal or ureteral calculi CT abdomen and pelvis without contrast recommended. XR CHEST PORTABLE   Final Result       Slight bibasilar opacities. Cardiomegaly.           Assessment/Plan:    Active Hospital Problems    Diagnosis Date Noted    COVID-19 [U07.1] 11/18/2020       Plan:    # Viral gastroenteritis, likely secondary to COVID-19  -likely caused dehydration and orthostasis  -continue IV fluids    # CHRISTINE, likely pre-renal  -nephrology following  -Cr improving (4.8>4.5>4.1>3.4)  -continue IV fluids    # Gout  -holding allopurinol  -uric acid 7.4    # COVID 19 +  -no shortness of breath or hypoxemia, thus no indication for tx at this time  -Continue to monitor    # T2DM  -sugars well controlled with mealtime and correctional insulin  -carb control diet    # CAD s/p stent 2016  -troponin 0.06>0.05, no chest pain  -suspect this is due to CHRISTINE  -continue brilinta  -Continue to monitor    # HTN  -continue hydralazine, metoprolol    DVT Prophylaxis: heparin  Diet: DIET GENERAL; Carb Control: 4 carb choices (60 gms)/meal  Code Status: Full Code    PT/OT Eval Status: ongoing    Dispo - inpt, dispo pending clinical improvement, improvement in kidney function    Jennifer Craven MD

## 2020-11-22 LAB
ANION GAP SERPL CALCULATED.3IONS-SCNC: 12 MMOL/L (ref 3–16)
BUN BLDV-MCNC: 44 MG/DL (ref 7–20)
CALCIUM SERPL-MCNC: 8.8 MG/DL (ref 8.3–10.6)
CHLORIDE BLD-SCNC: 102 MMOL/L (ref 99–110)
CO2: 26 MMOL/L (ref 21–32)
CREAT SERPL-MCNC: 2.9 MG/DL (ref 0.6–1.2)
GFR AFRICAN AMERICAN: 19
GFR NON-AFRICAN AMERICAN: 16
GLUCOSE BLD-MCNC: 103 MG/DL (ref 70–99)
GLUCOSE BLD-MCNC: 114 MG/DL (ref 70–99)
GLUCOSE BLD-MCNC: 126 MG/DL (ref 70–99)
GLUCOSE BLD-MCNC: 98 MG/DL (ref 70–99)
HCT VFR BLD CALC: 29.1 % (ref 36–48)
HEMOGLOBIN: 9.7 G/DL (ref 12–16)
MCH RBC QN AUTO: 31 PG (ref 26–34)
MCHC RBC AUTO-ENTMCNC: 33.2 G/DL (ref 31–36)
MCV RBC AUTO: 93.1 FL (ref 80–100)
PDW BLD-RTO: 14.1 % (ref 12.4–15.4)
PERFORMED ON: ABNORMAL
PLATELET # BLD: 427 K/UL (ref 135–450)
PMV BLD AUTO: 7.2 FL (ref 5–10.5)
POTASSIUM REFLEX MAGNESIUM: 3.7 MMOL/L (ref 3.5–5.1)
RBC # BLD: 3.12 M/UL (ref 4–5.2)
SODIUM BLD-SCNC: 140 MMOL/L (ref 136–145)
WBC # BLD: 8.1 K/UL (ref 4–11)

## 2020-11-22 PROCEDURE — 2580000003 HC RX 258: Performed by: INTERNAL MEDICINE

## 2020-11-22 PROCEDURE — 6360000002 HC RX W HCPCS: Performed by: INTERNAL MEDICINE

## 2020-11-22 PROCEDURE — 2060000000 HC ICU INTERMEDIATE R&B

## 2020-11-22 PROCEDURE — 85027 COMPLETE CBC AUTOMATED: CPT

## 2020-11-22 PROCEDURE — 6370000000 HC RX 637 (ALT 250 FOR IP): Performed by: INTERNAL MEDICINE

## 2020-11-22 PROCEDURE — 6370000000 HC RX 637 (ALT 250 FOR IP): Performed by: STUDENT IN AN ORGANIZED HEALTH CARE EDUCATION/TRAINING PROGRAM

## 2020-11-22 PROCEDURE — 80048 BASIC METABOLIC PNL TOTAL CA: CPT

## 2020-11-22 PROCEDURE — 36415 COLL VENOUS BLD VENIPUNCTURE: CPT

## 2020-11-22 RX ORDER — LANOLIN ALCOHOL/MO/W.PET/CERES
3 CREAM (GRAM) TOPICAL NIGHTLY PRN
Status: DISCONTINUED | OUTPATIENT
Start: 2020-11-22 | End: 2020-11-24 | Stop reason: HOSPADM

## 2020-11-22 RX ADMIN — HYDRALAZINE HYDROCHLORIDE 50 MG: 50 TABLET, FILM COATED ORAL at 09:12

## 2020-11-22 RX ADMIN — Medication 3 MG: at 22:55

## 2020-11-22 RX ADMIN — TICAGRELOR 60 MG: 60 TABLET ORAL at 09:13

## 2020-11-22 RX ADMIN — Medication 1000 UNITS: at 09:12

## 2020-11-22 RX ADMIN — HEPARIN SODIUM 5000 UNITS: 5000 INJECTION INTRAVENOUS; SUBCUTANEOUS at 06:56

## 2020-11-22 RX ADMIN — Medication 1 CAPSULE: at 09:12

## 2020-11-22 RX ADMIN — HEPARIN SODIUM 5000 UNITS: 5000 INJECTION INTRAVENOUS; SUBCUTANEOUS at 22:54

## 2020-11-22 RX ADMIN — ASPIRIN 81 MG: 81 TABLET, COATED ORAL at 09:12

## 2020-11-22 RX ADMIN — SODIUM CHLORIDE: 9 INJECTION, SOLUTION INTRAVENOUS at 05:23

## 2020-11-22 RX ADMIN — METOPROLOL TARTRATE 25 MG: 25 TABLET, FILM COATED ORAL at 20:50

## 2020-11-22 RX ADMIN — SERTRALINE HYDROCHLORIDE 50 MG: 50 TABLET ORAL at 09:12

## 2020-11-22 RX ADMIN — METOPROLOL TARTRATE 25 MG: 25 TABLET, FILM COATED ORAL at 09:12

## 2020-11-22 RX ADMIN — TICAGRELOR 60 MG: 60 TABLET ORAL at 21:15

## 2020-11-22 RX ADMIN — HYDRALAZINE HYDROCHLORIDE 50 MG: 50 TABLET, FILM COATED ORAL at 20:50

## 2020-11-22 RX ADMIN — ACETAMINOPHEN 650 MG: 325 TABLET ORAL at 22:55

## 2020-11-22 RX ADMIN — HEPARIN SODIUM 5000 UNITS: 5000 INJECTION INTRAVENOUS; SUBCUTANEOUS at 15:13

## 2020-11-22 ASSESSMENT — PAIN DESCRIPTION - FREQUENCY: FREQUENCY: INTERMITTENT

## 2020-11-22 ASSESSMENT — PAIN DESCRIPTION - ORIENTATION: ORIENTATION: RIGHT

## 2020-11-22 ASSESSMENT — PAIN DESCRIPTION - LOCATION: LOCATION: ANKLE

## 2020-11-22 ASSESSMENT — PAIN SCALES - GENERAL
PAINLEVEL_OUTOF10: 5
PAINLEVEL_OUTOF10: 0
PAINLEVEL_OUTOF10: 0
PAINLEVEL_OUTOF10: 3
PAINLEVEL_OUTOF10: 0
PAINLEVEL_OUTOF10: 0

## 2020-11-22 ASSESSMENT — PAIN DESCRIPTION - PAIN TYPE: TYPE: ACUTE PAIN

## 2020-11-22 ASSESSMENT — PAIN DESCRIPTION - PROGRESSION: CLINICAL_PROGRESSION: NOT CHANGED

## 2020-11-22 ASSESSMENT — PAIN - FUNCTIONAL ASSESSMENT: PAIN_FUNCTIONAL_ASSESSMENT: ACTIVITIES ARE NOT PREVENTED

## 2020-11-22 ASSESSMENT — PAIN DESCRIPTION - ONSET: ONSET: GRADUAL

## 2020-11-22 ASSESSMENT — PAIN DESCRIPTION - DESCRIPTORS: DESCRIPTORS: DISCOMFORT

## 2020-11-22 NOTE — PROGRESS NOTES
[facility] NEPHROLOGY    [facility]              [phone]                      Diamond Keith is admitted with CHRISTINE. We are following for CHRISTINE    Interval History and plan:      Urine suggestive of prerenal etiology  SCr improving now 2.9 and bicarb 26 continue IVF for now but removed bicarb and place on NS at 75 cc/hr. Renal US is bilateral normal sized kidneys   BP stable 139/60 off Amlodipine  UOP not recorded. States she is urinating well. Avoid ACE/ARB, NSAID, IV contrast and other nephrotoxic meds                   Assessment :     Dehydration  - d/t diarrhea and poor PO intake. - continue fluids, monitor UOP     CHRISTINE  - suspect primarily due to dehydration  - is possibly AIN from allopurinol. Check urine eosinophils  - no prior history of kidney disease  - reports taking lisinopril   - is COVID +, secondary renal injury from [place] is a possibility  - has 1 gram / day of proteinuria    NAGMA + AGMA  - primarily NAGMA from diarrhea  - give bicarb in fluids    NSTEMI  - management per primary      CC/reason for consult :     CHRISTINE     HPI :     Diamond Keith is a 79 y.o. female presented to the hospital on 11/18/2020 with dizziness. She tested positive for COVID 19 2 weeks ago. At first was not feeling any symptoms but did eventually notice fatigue, diarrhea, and dizziness. The diarrhea has been up to 5 times per day, is watery without blood or mucus. Has not had fever. Has had poor PO intake. Has not noticed any changes in her urine, either in the color, frequency, amount, or associated pain. Does not smoke or drink alcohol. Has lost about 13 pounds in 1 month. She thinks allopurinol is cause of diarrhea because her dose was recently increased when symptoms began (from 200mg to 300mg). She also takes an acute relief medication but is unable to recall which medication it is (possibly medrol). She says that she takes 6-8 pills per day of this med when needed. Last used in October.  Denies hepatosplenomegaly-  no  Musculoskeletal:  clubbing no,cyanosis- no , digital ischemia- no                           muscle strength- grossly normal , tone - grossly normal  Skin: rashes- no , ulcers- no, induration- no, tightening - no  Psychiatric:  Judgement and insight- normal           AAO X 3  Additional finding: skin is dry     LABS:-      Recent Labs     11/20/20  0530 11/21/20  0554 11/22/20  0551   WBC 8.5 8.0 8.1   HGB 11.2* 10.9* 9.7*   HCT 33.1* 32.3* 29.1*    410 427     Recent Labs     11/20/20  0530 11/21/20  0554 11/22/20  0925   * 137 140   K 3.4* 3.5 3.7   CL 97* 99 102   CO2 23 26 26   BUN 64* 53* 44*   CREATININE 4.1* 3.4* 2.9*   GLUCOSE 176* 122* 98   MG 1.60* 1.60*  --       Otelia Grate  1:33 PM  11/22/2020        Thanks  Nephrology  [address]  Office: [phone]  Cell: [phone]  Fax: [phone]

## 2020-11-22 NOTE — PLAN OF CARE
Problem: Falls - Risk of:  Goal: Will remain free from falls  Description: Will remain free from falls  11/22/2020 0322 by Christiano Wong  Outcome: Ongoing   Pt is UAL and reports no SOB or dizziness. Pt calls out appropriately if they need something. Will continue to assess pt's risk for falls. Problem: Airway Clearance - Ineffective  Goal: Achieve or maintain patent airway  11/22/2020 0322 by Christiano Wong  Outcome: Ongoing   Pt is on room air and SpO2 is WNL. Pt reports no SOB. Pt wears home BiPAP when asleep. Problem: Fluid Volume:  Goal: Ability to achieve a balanced intake and output will improve  Description: Ability to achieve a balanced intake and output will improve  11/22/2020 0322 by Christiano Wong  Outcome: Ongoing   Pt has had two urine occurrences this shift and continues to tolerate oral fluids and is receiving 75 ml/hr IVF. Problem: Pain:  Goal: Pain level will decrease  Description: Pain level will decrease  Outcome: Ongoing   Pt has denied pain throughout the shift. Will continue to assess pt's pain level.

## 2020-11-22 NOTE — PLAN OF CARE
Problem: Falls - Risk of:  Goal: Will remain free from falls  Description: Will remain free from falls  11/22/2020 1248 by Deborah Keenan RN  Note: Ambulate independently with steady gait  call light in reach at all times  nonskid socks when OOB    bed low with wheels locked  continue fall prevention measures     Problem: Airway Clearance - Ineffective  Goal: Achieve or maintain patent airway  11/22/2020 1248 by Deborah Keenan RN  Outcome: Ongoing     Problem: Fluid Volume:  Goal: Ability to achieve a balanced intake and output will improve  Description: Ability to achieve a balanced intake and output will improve  11/22/2020 1248 by Deborah Keenan RN  Note: IVF infusing per order  daily metabolic panel  patient reports one BM this morning, soft but formed  patient refusing collection hat in toilet for accurate urine output  continue assess     Problem: Pain:  Description: Pain management should include both nonpharmacologic and pharmacologic interventions.   Goal: Pain level will decrease  Description: Pain level will decrease  11/22/2020 1248 by Deborah Keenan RN  Outcome: Ongoing

## 2020-11-23 LAB
ANION GAP SERPL CALCULATED.3IONS-SCNC: 10 MMOL/L (ref 3–16)
BUN BLDV-MCNC: 36 MG/DL (ref 7–20)
CALCIUM SERPL-MCNC: 8.6 MG/DL (ref 8.3–10.6)
CHLORIDE BLD-SCNC: 105 MMOL/L (ref 99–110)
CO2: 25 MMOL/L (ref 21–32)
CREAT SERPL-MCNC: 2.5 MG/DL (ref 0.6–1.2)
GFR AFRICAN AMERICAN: 23
GFR NON-AFRICAN AMERICAN: 19
GLUCOSE BLD-MCNC: 106 MG/DL (ref 70–99)
GLUCOSE BLD-MCNC: 113 MG/DL (ref 70–99)
GLUCOSE BLD-MCNC: 129 MG/DL (ref 70–99)
GLUCOSE BLD-MCNC: 133 MG/DL (ref 70–99)
GLUCOSE BLD-MCNC: 165 MG/DL (ref 70–99)
HCT VFR BLD CALC: 31.1 % (ref 36–48)
HEMOGLOBIN: 10.3 G/DL (ref 12–16)
MAGNESIUM: 1.4 MG/DL (ref 1.8–2.4)
MCH RBC QN AUTO: 31.1 PG (ref 26–34)
MCHC RBC AUTO-ENTMCNC: 33 G/DL (ref 31–36)
MCV RBC AUTO: 94.1 FL (ref 80–100)
PDW BLD-RTO: 14 % (ref 12.4–15.4)
PERFORMED ON: ABNORMAL
PLATELET # BLD: 397 K/UL (ref 135–450)
PMV BLD AUTO: 7 FL (ref 5–10.5)
POTASSIUM REFLEX MAGNESIUM: 3.5 MMOL/L (ref 3.5–5.1)
RBC # BLD: 3.3 M/UL (ref 4–5.2)
SODIUM BLD-SCNC: 140 MMOL/L (ref 136–145)
WBC # BLD: 7.9 K/UL (ref 4–11)

## 2020-11-23 PROCEDURE — 6370000000 HC RX 637 (ALT 250 FOR IP): Performed by: STUDENT IN AN ORGANIZED HEALTH CARE EDUCATION/TRAINING PROGRAM

## 2020-11-23 PROCEDURE — 83735 ASSAY OF MAGNESIUM: CPT

## 2020-11-23 PROCEDURE — 2580000003 HC RX 258: Performed by: INTERNAL MEDICINE

## 2020-11-23 PROCEDURE — 80048 BASIC METABOLIC PNL TOTAL CA: CPT

## 2020-11-23 PROCEDURE — 85027 COMPLETE CBC AUTOMATED: CPT

## 2020-11-23 PROCEDURE — 6360000002 HC RX W HCPCS: Performed by: INTERNAL MEDICINE

## 2020-11-23 PROCEDURE — 2060000000 HC ICU INTERMEDIATE R&B

## 2020-11-23 PROCEDURE — 6370000000 HC RX 637 (ALT 250 FOR IP): Performed by: INTERNAL MEDICINE

## 2020-11-23 RX ORDER — MAGNESIUM SULFATE IN WATER 40 MG/ML
4 INJECTION, SOLUTION INTRAVENOUS ONCE
Status: COMPLETED | OUTPATIENT
Start: 2020-11-23 | End: 2020-11-23

## 2020-11-23 RX ADMIN — ASPIRIN 81 MG: 81 TABLET, COATED ORAL at 08:39

## 2020-11-23 RX ADMIN — TICAGRELOR 60 MG: 60 TABLET ORAL at 08:39

## 2020-11-23 RX ADMIN — METOPROLOL TARTRATE 25 MG: 25 TABLET, FILM COATED ORAL at 08:39

## 2020-11-23 RX ADMIN — ACETAMINOPHEN 650 MG: 325 TABLET ORAL at 22:21

## 2020-11-23 RX ADMIN — HEPARIN SODIUM 5000 UNITS: 5000 INJECTION INTRAVENOUS; SUBCUTANEOUS at 22:23

## 2020-11-23 RX ADMIN — Medication 1000 UNITS: at 08:38

## 2020-11-23 RX ADMIN — HYDRALAZINE HYDROCHLORIDE 50 MG: 50 TABLET, FILM COATED ORAL at 22:21

## 2020-11-23 RX ADMIN — ACETAMINOPHEN 650 MG: 325 TABLET ORAL at 14:58

## 2020-11-23 RX ADMIN — MAGNESIUM SULFATE HEPTAHYDRATE 4 G: 40 INJECTION, SOLUTION INTRAVENOUS at 08:41

## 2020-11-23 RX ADMIN — INSULIN LISPRO 2 UNITS: 100 INJECTION, SOLUTION INTRAVENOUS; SUBCUTANEOUS at 12:07

## 2020-11-23 RX ADMIN — HYDRALAZINE HYDROCHLORIDE 50 MG: 50 TABLET, FILM COATED ORAL at 08:39

## 2020-11-23 RX ADMIN — SERTRALINE HYDROCHLORIDE 50 MG: 50 TABLET ORAL at 08:39

## 2020-11-23 RX ADMIN — HEPARIN SODIUM 5000 UNITS: 5000 INJECTION INTRAVENOUS; SUBCUTANEOUS at 12:54

## 2020-11-23 RX ADMIN — SODIUM CHLORIDE: 9 INJECTION, SOLUTION INTRAVENOUS at 08:37

## 2020-11-23 RX ADMIN — METOPROLOL TARTRATE 25 MG: 25 TABLET, FILM COATED ORAL at 22:22

## 2020-11-23 RX ADMIN — TICAGRELOR 60 MG: 60 TABLET ORAL at 22:37

## 2020-11-23 RX ADMIN — Medication 1 CAPSULE: at 08:40

## 2020-11-23 RX ADMIN — HEPARIN SODIUM 5000 UNITS: 5000 INJECTION INTRAVENOUS; SUBCUTANEOUS at 06:26

## 2020-11-23 ASSESSMENT — PAIN DESCRIPTION - PROGRESSION
CLINICAL_PROGRESSION: GRADUALLY IMPROVING
CLINICAL_PROGRESSION: GRADUALLY WORSENING
CLINICAL_PROGRESSION: NOT CHANGED
CLINICAL_PROGRESSION: GRADUALLY IMPROVING
CLINICAL_PROGRESSION: NOT CHANGED
CLINICAL_PROGRESSION: GRADUALLY IMPROVING

## 2020-11-23 ASSESSMENT — PAIN DESCRIPTION - PAIN TYPE
TYPE: CHRONIC PAIN
TYPE: ACUTE PAIN
TYPE: CHRONIC PAIN

## 2020-11-23 ASSESSMENT — PAIN DESCRIPTION - ORIENTATION
ORIENTATION: RIGHT;LEFT

## 2020-11-23 ASSESSMENT — PAIN DESCRIPTION - FREQUENCY
FREQUENCY: CONTINUOUS
FREQUENCY: INTERMITTENT

## 2020-11-23 ASSESSMENT — PAIN DESCRIPTION - LOCATION
LOCATION: FOOT;ANKLE
LOCATION: FOOT
LOCATION: FOOT
LOCATION: ANKLE
LOCATION: FOOT;ANKLE

## 2020-11-23 ASSESSMENT — PAIN - FUNCTIONAL ASSESSMENT
PAIN_FUNCTIONAL_ASSESSMENT: PREVENTS OR INTERFERES SOME ACTIVE ACTIVITIES AND ADLS
PAIN_FUNCTIONAL_ASSESSMENT: ACTIVITIES ARE NOT PREVENTED
PAIN_FUNCTIONAL_ASSESSMENT: PREVENTS OR INTERFERES SOME ACTIVE ACTIVITIES AND ADLS

## 2020-11-23 ASSESSMENT — PAIN DESCRIPTION - ONSET
ONSET: ON-GOING
ONSET: GRADUAL
ONSET: ON-GOING

## 2020-11-23 ASSESSMENT — PAIN SCALES - GENERAL
PAINLEVEL_OUTOF10: 8
PAINLEVEL_OUTOF10: 5
PAINLEVEL_OUTOF10: 3
PAINLEVEL_OUTOF10: 8
PAINLEVEL_OUTOF10: 3
PAINLEVEL_OUTOF10: 5
PAINLEVEL_OUTOF10: 0

## 2020-11-23 ASSESSMENT — PAIN DESCRIPTION - DESCRIPTORS
DESCRIPTORS: ACHING
DESCRIPTORS: DISCOMFORT
DESCRIPTORS: ACHING

## 2020-11-23 NOTE — PROGRESS NOTES
Pt refused RN to update family via phone at this time. Pt states she has been speaking with them throughout the day today via phone.  Electronically signed by Park Pratt RN on 11/23/2020 at 5:31 PM

## 2020-11-23 NOTE — PROGRESS NOTES
[facility] NEPHROLOGY    [facility]              [phone]                      Kenzie Molina is admitted with CHRISTINE. We are following for CHRISTINE    Interval History and plan:      Urine suggestive of prerenal etiology  SCr improving now 2.5 . Bicarbonate improved. Continue with IV fluid. Replace IV magnesium. UOP not recorded. States she is urinating well. Avoid ACE/ARB, NSAID, IV contrast and other nephrotoxic meds                   Assessment :     Dehydration  - d/t diarrhea and poor PO intake. - continue fluids, monitor UOP     CHRISTINE  - suspect primarily due to dehydration  - is possibly AIN from allopurinol. Check urine eosinophils  - no prior history of kidney disease  - reports taking lisinopril   - is COVID +, secondary renal injury from [place] is a possibility  - has 1 gram / day of proteinuria  Renal US is bilateral normal sized kidneys     NAGMA + AGMA  - primarily NAGMA from diarrhea  - given bicarb in fluids    NSTEMI  - management per primary      CC/reason for consult :     CHRISTINE     HPI :     Kenzie Molina is a 79 y.o. female presented to the hospital on 11/18/2020 with dizziness. She tested positive for COVID 19 2 weeks ago. At first was not feeling any symptoms but did eventually notice fatigue, diarrhea, and dizziness. The diarrhea has been up to 5 times per day, is watery without blood or mucus. Has not had fever. Has had poor PO intake. Has not noticed any changes in her urine, either in the color, frequency, amount, or associated pain. Does not smoke or drink alcohol. Has lost about 13 pounds in 1 month. She thinks allopurinol is cause of diarrhea because her dose was recently increased when symptoms began (from 200mg to 300mg). She also takes an acute relief medication but is unable to recall which medication it is (possibly medrol). She says that she takes 6-8 pills per day of this med when needed. Last used in October. Denies taking over the counter pain relief. Medical history  - CAD s/p placement of 2 stents, 3 years ago. Dr Florentin Carter. - Pancreatitis last year. Uncertain cause. Had cholecystectomy prior.   - Gout    ROS:       positives in bold   Constitutional:  fever, chills, weakness, weight change, fatigue  Skin:  rash, pruritus, hair loss, bruising, dry skin, petechiae  Head, Face, Neck   headaches, swelling,  cervical adenopathy  Respiratory: shortness of breath, cough, or wheezing  Cardiovascular: chest pain, palpitations, dizzy, edema  Gastrointestinal: nausea, vomiting, diarrhea, constipation,belly pain    Yellow skin, blood in stool  Musculoskeletal:  back pain, muscle weakness, gait problems,       joint pain or swelling. Genitourinary:  dysuria, poor urine flow, flank pain, blood in urine  Neurologic:  vertigo, TIA'S, syncope, seizures, focal weakness  Psychosocial:  insomnia, anxiety, or depression. 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 She has never used smokeless tobacco. She reports that she does not drink alcohol.    family history is not on file.          Medication:     Current Facility-Administered Medications: melatonin tablet 3 mg, 3 mg, Oral, Nightly PRN  0.9 % sodium chloride infusion, , Intravenous, Continuous  Evolocumab SOSY 140 mg, 140 mg, Subcutaneous, Q14 Days  lactobacillus (CULTURELLE) capsule 1 capsule, 1 capsule, Oral, Daily  heparin (porcine) injection 5,000 Units, 5,000 Units, Subcutaneous, 3 times per day  sodium chloride flush 0.9 % injection 10 mL, 10 mL, Intravenous, 2 times per day  sodium chloride flush 0.9 % injection 10 mL, 10 mL, Intravenous, PRN  acetaminophen (TYLENOL) tablet 650 mg, 650 mg, Oral, Q6H PRN **OR** acetaminophen (TYLENOL) suppository 650 mg, 650 mg, Rectal, Q6H PRN  magnesium hydroxide (MILK OF MAGNESIA) 400 MG/5ML suspension 30 mL, 30 mL, Oral, Daily PRN  promethazine (PHENERGAN) tablet 12.5 mg, 12.5 mg, Oral, Q6H PRN **OR** ondansetron (ZOFRAN) injection 4 mg, 4 mg, Intravenous, Q6H PRN  aspirin EC tablet 81 mg, 81 mg, Oral, Daily  ticagrelor (BRILINTA) tablet 60 mg, 60 mg, Oral, BID  metoprolol tartrate (LOPRESSOR) tablet 25 mg, 25 mg, Oral, BID  sertraline (ZOLOFT) tablet 50 mg, 50 mg, Oral, Daily  hydrALAZINE (APRESOLINE) tablet 50 mg, 50 mg, Oral, BID  vitamin D (CHOLECALCIFEROL) tablet 1,000 Units, 1,000 Units, Oral, Daily  glucose (GLUTOSE) 40 % oral gel 15 g, 15 g, Oral, PRN  dextrose 50 % IV solution, 12.5 g, Intravenous, PRN  glucagon (rDNA) injection 1 mg, 1 mg, Intramuscular, PRN  dextrose 5 % solution, 100 mL/hr, Intravenous, PRN  insulin lispro (1 Unit Dial) 0-12 Units, 0-12 Units, Subcutaneous, TID WC  insulin lispro (1 Unit Dial) 0-6 Units, 0-6 Units, Subcutaneous, Nightly       Vitals :     Vitals:    11/23/20 0600   BP: (!) 150/76   Pulse: 64   Resp: 18   Temp: 97.6 °F (36.4 °C)   SpO2: 95%       I & O :       Intake/Output Summary (Last 24 hours) at 11/23/2020 5739  Last data filed at 11/23/2020 0124  Gross per 24 hour   Intake 1800 ml   Output --   Net 1800 ml        Physical Examination :     General appearance: Anxious- no, distressed- no, in good spirits- yes  HEENT: Lips- normal, teeth- ok , oral mucosa- dry, cracked  Neck : Mass- no, appears symmetrical, JVD- not visible  Respiratory: Respiratory effort-  normal, wheeze- no, crackles - no  Cardiovascular:  Ausculation- No M/R/G, Edema no  Abdomen: visible mass- no, distention- no, scar- no, tenderness- no hepatosplenomegaly-  no  Musculoskeletal:  clubbing no,cyanosis- no , digital ischemia- no                           muscle strength- grossly normal , tone - grossly normal  Skin: rashes- no , ulcers- no, induration- no, tightening - no  Psychiatric:  Judgement and insight- normal           AAO X 3  Additional finding: skin is dry     LABS:-      Recent Labs     11/21/20  0554 11/22/20  0551 11/23/20  0638   WBC 8.0 8.1 7.9   HGB 10.9* 9.7* 10.3*   HCT 32.3* 29.1* 31.1*    427 397     Recent Labs     11/21/20  0554 11/22/20  0925 11/23/20  0638    140 140   K 3.5 3.7 3.5   CL 99 102 105   CO2 26 26 25   BUN 53* 44* 36*   CREATININE 3.4* 2.9* 2.5*   GLUCOSE 122* 98 106*   MG 1.60*  --  1.40*      Adamaris Smart  7:29 AM  11/23/2020        Thanks  Nephrology  [address] [facility], [address]  Office: [phone]  Cell: [phone]  Fax: [phone]

## 2020-11-23 NOTE — CARE COORDINATION
Case Management Assessment           Daily Note                 Date/ Time of Note: 11/23/2020 1:33 PM         Note completed by: Yari Barroso    Patient Name: Kayla Haney  YOB: 1950    Diagnosis:COVID-19 [U07.1]  COVID-19 [U07.1]  Patient Admission Status: Inpatient    Date of Admission:11/18/2020  5:08 AM Length of Stay: 5 GLOS:      Current Plan of Care: improving renal function, COVID +   ________________________________________________________________________________________  PT AM-PAC: 22 / 24 per last evaluation on: 11.19.2020    OT AM-PAC: 23 / 24 per last evaluation on: 11.18.2020    DME Needs for discharge:   ________________________________________________________________________________________  Discharge Plan: Home    Tentative discharge date: 11/24/20     Current barriers to discharge: Medical stability and clearance for discharge    Referrals completed: Not Applicable    Resources/ information provided: Not indicated at this time  ________________________________________________________________________________________  Case Management Notes: CM continues to follow for discharge planning and needs. Patient has improving renal function, per MD tentative DC home 11/24/20 pending continued improved renal function. Patient plans for return home with spouse, no current CM needs at this time. Geofm Eisenmenger and her family were provided with choice of provider; she and her family are in agreement with the discharge plan.     Care Transition Patient: Rosario Barroso RN  [facility]  Case Management Department  Ph: [phone]  Fax: [phone]

## 2020-11-23 NOTE — PROGRESS NOTES
Hospitalist Progress Note      PCP: Lavern Mcdowell    Date of Admission: 11/18/2020    Chief Complaint: diarrhea, malaise    Subjective:  Patient seen and examined at the bedside. Cr continues to improve. Today has some tenderness in both ankles from gout but does not want to take any medication for this. PFHS: reviewed as documented 11/18/2020, no changes    Medications:  Reviewed    Infusion Medications    sodium chloride 75 mL/hr at 11/23/20 0837    dextrose       Scheduled Medications    magnesium sulfate  4 g Intravenous Once    Evolocumab  140 mg Subcutaneous Q14 Days    lactobacillus  1 capsule Oral Daily    heparin (porcine)  5,000 Units Subcutaneous 3 times per day    sodium chloride flush  10 mL Intravenous 2 times per day    aspirin EC  81 mg Oral Daily    ticagrelor  60 mg Oral BID    metoprolol tartrate  25 mg Oral BID    sertraline  50 mg Oral Daily    hydrALAZINE  50 mg Oral BID    Vitamin D  1,000 Units Oral Daily    insulin lispro  0-12 Units Subcutaneous TID WC    insulin lispro  0-6 Units Subcutaneous Nightly     PRN Meds: melatonin, sodium chloride flush, acetaminophen **OR** acetaminophen, magnesium hydroxide, promethazine **OR** ondansetron, glucose, dextrose, glucagon (rDNA), dextrose      Intake/Output Summary (Last 24 hours) at 11/23/2020 0917  Last data filed at 11/23/2020 3481  Gross per 24 hour   Intake 2893 ml   Output --   Net 2893 ml         Physical Exam Performed:    BP (!) 147/66   Pulse 68   Temp 97.9 °F (36.6 °C) (Oral)   Resp 20   Ht 5' (1.524 m)   Wt 229 lb 9.6 oz (104.1 kg)   SpO2 96%   BMI 44.84 kg/m²     General appearance:  No apparent distress, appears stated age  Eyes: Pupils equal, round, reactive to light, conjunctiva/corneas clear  Ears/Nose/Mouth/Throat: No external lesions or scars, hearing intact to voice  Neck: Trachea midline, no masses noted  Respiratory:  Normal respiratory effort.  Clear to auscultation bilaterally  Cardiovascular: Regular rate and rhythm, nl S1/S2, w/o murmurs, rubs or gallops, no lower extremity edema  Abdomen: Soft, non-tender, non-distended, no hepatosplenomegaly  Musculoskeletal: No cyanosis, clubbing or petechiae, no lower extremity misalignment, asymmetry, or crepitation, tenderness to palpation in bilateral ankles  Skin: Normal skin color, texture, turgor. No rashes or lesions noted. Psychiatric: Alert and oriented x4, good insight and judgment    Labs:   Recent Labs     11/21/20  0554 11/22/20  0551 11/23/20  0638   WBC 8.0 8.1 7.9   HGB 10.9* 9.7* 10.3*   HCT 32.3* 29.1* 31.1*    427 397     Recent Labs     11/21/20  0554 11/22/20  0925 11/23/20  0638    140 140   K 3.5 3.7 3.5   CL 99 102 105   CO2 26 26 25   BUN 53* 44* 36*   CREATININE 3.4* 2.9* 2.5*   CALCIUM 8.6 8.8 8.6     No results for input(s): AST, ALT, BILIDIR, BILITOT, ALKPHOS in the last 72 hours. No results for input(s): INR in the last 72 hours. No results for input(s): Izabel Pope in the last 72 hours. Urinalysis:      Lab Results   Component Value Date    NITRU Negative 11/18/2020    WBCUA 3-5 11/18/2020    BACTERIA 2+ 11/18/2020    RBCUA 0-2 11/18/2020    BLOODU SMALL 11/18/2020    SPECGRAV 1.025 11/18/2020    GLUCOSEU Negative 11/18/2020       Radiology:  US RENAL COMPLETE   Final Result      No hydronephrosis      If concern for renal or ureteral calculi CT abdomen and pelvis without contrast recommended. XR CHEST PORTABLE   Final Result       Slight bibasilar opacities. Cardiomegaly.           Assessment/Plan:    Active Hospital Problems    Diagnosis Date Noted    COVID-19 [U07.1] 11/18/2020       Plan:    # Viral gastroenteritis, likely secondary to COVID-19  -likely caused dehydration and orthostasis  -continue IV fluids     # CHRISTINE, likely pre-renal  -nephrology following  -Cr improving (4.8>4.5>4.1>3.4>2.9), Cr 2.4 today  -continue IV fluids, will change to NS today    # Hypomagnesemia  -repleted    # Gout  -holding allopurinol  -uric acid 7.4  -analgesia available should she need it     # COVID 19 +  -no shortness of breath or hypoxemia, thus no indication for tx at this time  -Continue to monitor     # T2DM  -sugars well controlled with mealtime and correctional insulin  -carb control diet     # CAD s/p stent 2016  -troponin 0.06>0.05, no chest pain  -suspect this is due to CHRISTINE  -continue brilinta  -Continue to monitor     # HTN  -continue hydralazine, metoprolol    DVT Prophylaxis: heparin  Diet: DIET GENERAL; Carb Control: 4 carb choices (60 gms)/meal  Code Status: Full Code    PT/OT Eval Status: ongoing    Dispo - inpt, dispo pending clinical improvement, improvement in kidney function    Terese Edge MD

## 2020-11-23 NOTE — PLAN OF CARE
Problem: Falls - Risk of:  Goal: Will remain free from falls  Description: Will remain free from falls  Outcome: Ongoing    Pt is UAL and reports no SOB or dizziness. Pt calls out appropriately if they need something. Will continue to assess pt's risk for falls. Problem: Airway Clearance - Ineffective  Goal: Achieve or maintain patent airway  Outcome: Ongoing   Pt is on room air and SpO2 is WNL. Pt reports no SOB. Pt wears home BiPAP when asleep. Problem: Fluid Volume:  Goal: Ability to achieve a balanced intake and output will improve  Description: Ability to achieve a balanced intake and output will improve  Outcome: Ongoing   Pt has had two urine occurrences this shift and continues to tolerate oral fluids and is receiving 75 ml/hr IVF. Problem: Pain:  Goal: Pain level will decrease  Description: Pain level will decrease  Outcome: Ongoing  Pt reported 5/10 pain in ankles, requested pain medication. RN administered PRN tylenol, pt reported decrease in pain. Will continue to assess pt's pain level.

## 2020-11-23 NOTE — PLAN OF CARE
Problem: Falls - Risk of:  Goal: Will remain free from falls  Description: Will remain free from falls  11/23/2020 1001 by Kelly Smith RN  Note: Pt is a Low Fall Risk. See Dennychristiana Prestoneverettci Fall Risk Score. Pt bed in low position, bed wheels locked, non-skid socks in use, and 2/4 side rails up. Pt up as tolerated with steady gait observed. Call light and belongings left within reach and pt encouraged to call for assistance. Will continue with hourly rounds for PO intake, pain needs, toileting, and repositioning as needed. No needs expressed at this time. Problem: Airway Clearance - Ineffective  Goal: Achieve or maintain patent airway  11/23/2020 1001 by Kelly Smith RN  Note: SpO2 level 96% on room air. Lungs are clear/diminished to auscultation. Pt denies cough or SOB at this time. Droplet plus isolation maintained due to positive Covid-19 result. Will continue to monitor oxygenation/respiratory pattern and will report changes in pt condition to physician. Problem: Fluid Volume:  Goal: Ability to achieve a balanced intake and output will improve  Description: Ability to achieve a balanced intake and output will improve  11/23/2020 1001 by Kelly Smith RN  Note: Mg level=1.4 this AM. 4g IV Mg infusing at this time as scheduled. IV fluids continue as ordered. Pt eating breakfast at this time. Will continue to monitor electrolyte levels.

## 2020-11-24 VITALS
DIASTOLIC BLOOD PRESSURE: 70 MMHG | HEART RATE: 68 BPM | HEIGHT: 60 IN | WEIGHT: 229.6 LBS | OXYGEN SATURATION: 95 % | TEMPERATURE: 98.3 F | RESPIRATION RATE: 18 BRPM | BODY MASS INDEX: 45.07 KG/M2 | SYSTOLIC BLOOD PRESSURE: 158 MMHG

## 2020-11-24 LAB
ANION GAP SERPL CALCULATED.3IONS-SCNC: 10 MMOL/L (ref 3–16)
BUN BLDV-MCNC: 32 MG/DL (ref 7–20)
CALCIUM SERPL-MCNC: 9 MG/DL (ref 8.3–10.6)
CHLORIDE BLD-SCNC: 105 MMOL/L (ref 99–110)
CO2: 22 MMOL/L (ref 21–32)
CREAT SERPL-MCNC: 2.5 MG/DL (ref 0.6–1.2)
GFR AFRICAN AMERICAN: 23
GFR NON-AFRICAN AMERICAN: 19
GLUCOSE BLD-MCNC: 108 MG/DL (ref 70–99)
GLUCOSE BLD-MCNC: 113 MG/DL (ref 70–99)
GLUCOSE BLD-MCNC: 202 MG/DL (ref 70–99)
HCT VFR BLD CALC: 30.4 % (ref 36–48)
HEMOGLOBIN: 10 G/DL (ref 12–16)
MAGNESIUM: 2.1 MG/DL (ref 1.8–2.4)
MCH RBC QN AUTO: 31.5 PG (ref 26–34)
MCHC RBC AUTO-ENTMCNC: 33 G/DL (ref 31–36)
MCV RBC AUTO: 95.6 FL (ref 80–100)
PDW BLD-RTO: 14.2 % (ref 12.4–15.4)
PERFORMED ON: ABNORMAL
PERFORMED ON: ABNORMAL
PLATELET # BLD: 400 K/UL (ref 135–450)
PMV BLD AUTO: 7.2 FL (ref 5–10.5)
POTASSIUM REFLEX MAGNESIUM: 3.6 MMOL/L (ref 3.5–5.1)
RBC # BLD: 3.18 M/UL (ref 4–5.2)
SODIUM BLD-SCNC: 137 MMOL/L (ref 136–145)
WBC # BLD: 8.8 K/UL (ref 4–11)

## 2020-11-24 PROCEDURE — 80048 BASIC METABOLIC PNL TOTAL CA: CPT

## 2020-11-24 PROCEDURE — 6360000002 HC RX W HCPCS: Performed by: INTERNAL MEDICINE

## 2020-11-24 PROCEDURE — 2580000003 HC RX 258: Performed by: INTERNAL MEDICINE

## 2020-11-24 PROCEDURE — 85027 COMPLETE CBC AUTOMATED: CPT

## 2020-11-24 PROCEDURE — 6370000000 HC RX 637 (ALT 250 FOR IP): Performed by: INTERNAL MEDICINE

## 2020-11-24 PROCEDURE — 83735 ASSAY OF MAGNESIUM: CPT

## 2020-11-24 PROCEDURE — 6370000000 HC RX 637 (ALT 250 FOR IP): Performed by: STUDENT IN AN ORGANIZED HEALTH CARE EDUCATION/TRAINING PROGRAM

## 2020-11-24 RX ADMIN — HEPARIN SODIUM 5000 UNITS: 5000 INJECTION INTRAVENOUS; SUBCUTANEOUS at 06:07

## 2020-11-24 RX ADMIN — TICAGRELOR 60 MG: 60 TABLET ORAL at 09:30

## 2020-11-24 RX ADMIN — Medication 1000 UNITS: at 09:30

## 2020-11-24 RX ADMIN — Medication 1 CAPSULE: at 09:30

## 2020-11-24 RX ADMIN — SERTRALINE HYDROCHLORIDE 50 MG: 50 TABLET ORAL at 09:30

## 2020-11-24 RX ADMIN — ASPIRIN 81 MG: 81 TABLET, COATED ORAL at 09:31

## 2020-11-24 RX ADMIN — HYDRALAZINE HYDROCHLORIDE 50 MG: 50 TABLET, FILM COATED ORAL at 09:31

## 2020-11-24 RX ADMIN — SODIUM CHLORIDE: 9 INJECTION, SOLUTION INTRAVENOUS at 02:04

## 2020-11-24 RX ADMIN — METOPROLOL TARTRATE 25 MG: 25 TABLET, FILM COATED ORAL at 09:30

## 2020-11-24 ASSESSMENT — PAIN SCALES - GENERAL
PAINLEVEL_OUTOF10: 0
PAINLEVEL_OUTOF10: 0
PAINLEVEL_OUTOF10: 4
PAINLEVEL_OUTOF10: 0

## 2020-11-24 ASSESSMENT — PAIN DESCRIPTION - PROGRESSION: CLINICAL_PROGRESSION: NOT CHANGED

## 2020-11-24 ASSESSMENT — PAIN DESCRIPTION - FREQUENCY: FREQUENCY: CONTINUOUS

## 2020-11-24 ASSESSMENT — PAIN - FUNCTIONAL ASSESSMENT: PAIN_FUNCTIONAL_ASSESSMENT: ACTIVITIES ARE NOT PREVENTED

## 2020-11-24 ASSESSMENT — PAIN DESCRIPTION - PAIN TYPE: TYPE: CHRONIC PAIN

## 2020-11-24 ASSESSMENT — PAIN DESCRIPTION - DESCRIPTORS: DESCRIPTORS: ACHING

## 2020-11-24 ASSESSMENT — PAIN DESCRIPTION - LOCATION: LOCATION: ANKLE

## 2020-11-24 ASSESSMENT — PAIN DESCRIPTION - ONSET: ONSET: ON-GOING

## 2020-11-24 ASSESSMENT — PAIN DESCRIPTION - ORIENTATION: ORIENTATION: RIGHT;LEFT

## 2020-11-24 NOTE — PROGRESS NOTES
[facility]    [facility]              [phone]                      Eladio Beebe is admitted with CHRISTINE. We are following for CHRISTINE    Interval History and plan:      Urine suggestive of prerenal etiology  SCr improving now 2.5 . Bicarbonate improved. If discharged can be seen in the office in 2 to 3 weeks. Urine output is not recorded. Avoid ACE/ARB, NSAID, IV contrast and other nephrotoxic meds                   Assessment :     Dehydration  - d/t diarrhea and poor PO intake. - continue fluids, monitor UOP     CHRISTINE  - suspect primarily due to dehydration  - is possibly AIN from allopurinol. Check urine eosinophils  - no prior history of kidney disease  - reports taking lisinopril   - is COVID +, secondary renal injury from [place] is a possibility  - has 1 gram / day of proteinuria  Renal US is bilateral normal sized kidneys     NAGMA + AGMA  - primarily NAGMA from diarrhea  - given bicarb in fluids    NSTEMI  - management per primary      CC/reason for consult :     CHRISTINE     HPI :     Eladio Beebe is a 79 y.o. female presented to the hospital on 11/18/2020 with dizziness. She tested positive for COVID 19 2 weeks ago. At first was not feeling any symptoms but did eventually notice fatigue, diarrhea, and dizziness. The diarrhea has been up to 5 times per day, is watery without blood or mucus. Has not had fever. Has had poor PO intake. Has not noticed any changes in her urine, either in the color, frequency, amount, or associated pain. Does not smoke or drink alcohol. Has lost about 13 pounds in 1 month. She thinks allopurinol is cause of diarrhea because her dose was recently increased when symptoms began (from 200mg to 300mg). She also takes an acute relief medication but is unable to recall which medication it is (possibly medrol). She says that she takes 6-8 pills per day of this med when needed. Last used in October. Denies taking over the counter pain relief.      Medical history  - CAD s/p placement of 2 stents, 3 years ago. Dr Osbaldo Cee. - Pancreatitis last year. Uncertain cause. Had cholecystectomy prior.   - Gout    ROS:       positives in bold   Constitutional:  fever, chills, weakness, weight change, fatigue  Skin:  rash, pruritus, hair loss, bruising, dry skin, petechiae  Head, Face, Neck   headaches, swelling,  cervical adenopathy  Respiratory: shortness of breath, cough, or wheezing  Cardiovascular: chest pain, palpitations, dizzy, edema  Gastrointestinal: nausea, vomiting, diarrhea, constipation,belly pain    Yellow skin, blood in stool  Musculoskeletal:  back pain, muscle weakness, gait problems,       joint pain or swelling. Genitourinary:  dysuria, poor urine flow, flank pain, blood in urine  Neurologic:  vertigo, TIA'S, syncope, seizures, focal weakness  Psychosocial:  insomnia, anxiety, or depression. 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 She has never used smokeless tobacco. She reports that she does not drink alcohol.    family history is not on file.          Medication:     Current Facility-Administered Medications: melatonin tablet 3 mg, 3 mg, Oral, Nightly PRN  0.9 % sodium chloride infusion, , Intravenous, Continuous  Evolocumab SOSY 140 mg, 140 mg, Subcutaneous, Q14 Days  lactobacillus (CULTURELLE) capsule 1 capsule, 1 capsule, Oral, Daily  heparin (porcine) injection 5,000 Units, 5,000 Units, Subcutaneous, 3 times per day  sodium chloride flush 0.9 % injection 10 mL, 10 mL, Intravenous, 2 times per day  sodium chloride flush 0.9 % injection 10 mL, 10 mL, Intravenous, PRN  acetaminophen (TYLENOL) tablet 650 mg, 650 mg, Oral, Q6H PRN **OR** acetaminophen (TYLENOL) suppository 650 mg, 650 mg, Rectal, Q6H PRN  magnesium hydroxide (MILK OF MAGNESIA) 400 MG/5ML suspension 30 mL, 30 mL, Oral, Daily PRN  promethazine (PHENERGAN) tablet 12.5 mg, 12.5 mg, Oral, Q6H PRN **OR** ondansetron (ZOFRAN) injection 4 mg, 4 mg, Intravenous, Q6H PRN  aspirin EC tablet 81 mg, 81 mg, Oral, Daily  ticagrelor (BRILINTA) tablet 60 mg, 60 mg, Oral, BID  metoprolol tartrate (LOPRESSOR) tablet 25 mg, 25 mg, Oral, BID  sertraline (ZOLOFT) tablet 50 mg, 50 mg, Oral, Daily  hydrALAZINE (APRESOLINE) tablet 50 mg, 50 mg, Oral, BID  vitamin D (CHOLECALCIFEROL) tablet 1,000 Units, 1,000 Units, Oral, Daily  glucose (GLUTOSE) 40 % oral gel 15 g, 15 g, Oral, PRN  dextrose 50 % IV solution, 12.5 g, Intravenous, PRN  glucagon (rDNA) injection 1 mg, 1 mg, Intramuscular, PRN  dextrose 5 % solution, 100 mL/hr, Intravenous, PRN  insulin lispro (1 Unit Dial) 0-12 Units, 0-12 Units, Subcutaneous, TID WC  insulin lispro (1 Unit Dial) 0-6 Units, 0-6 Units, Subcutaneous, Nightly       Vitals :     Vitals:    11/24/20 0416   BP: (!) 159/67   Pulse: 63   Resp: 18   Temp: 98.2 °F (36.8 °C)   SpO2: 95%       I & O :       Intake/Output Summary (Last 24 hours) at 11/24/2020 0751  Last data filed at 11/24/2020 3001  Gross per 24 hour   Intake 3956 ml   Output --   Net 3956 ml        Physical Examination :     General appearance: Anxious- no, distressed- no, in good spirits- yes  HEENT: Lips- normal, teeth- ok , oral mucosa- dry, cracked  Neck : Mass- no, appears symmetrical, JVD- not visible  Respiratory: Respiratory effort-  normal, wheeze- no, crackles - no  Cardiovascular:  Ausculation- No M/R/G, Edema no  Abdomen: visible mass- no, distention- no, scar- no, tenderness- no hepatosplenomegaly-  no  Musculoskeletal:  clubbing no,cyanosis- no , digital ischemia- no                           muscle strength- grossly normal , tone - grossly normal  Skin: rashes- no , ulcers- no, induration- no, tightening - no  Psychiatric:  Judgement and insight- normal           AAO X 3  Additional finding: skin is dry     LABS:-      Recent Labs     11/22/20  0551 11/23/20  0638 11/24/20  0425   WBC 8.1 7.9 8.8   HGB 9.7* 10.3* 10.0*   HCT 29.1* 31.1* 30.4*    397 400     Recent Labs     11/22/20  0925 11/23/20  0638 11/24/20  0425    140 137   K 3.7 3.5 3.6    105 105   CO2 26 25 22   BUN 44* 36* 32*   CREATININE 2.9* 2.5* 2.5*   GLUCOSE 98 106* 113*   MG  --  1.40*  --       Henny Moran  7:51 AM  11/24/2020        Thanks  Nephrology  [phone] [facility], [address]  Office: [phone]  Cell: [phone]  Fax: [phone]

## 2020-11-24 NOTE — PLAN OF CARE
Nutrition Problem #1: Inadequate oral intake  Intervention: Food and/or Nutrient Delivery: Continue Current Diet, Start Oral Nutrition Supplement  Nutritional Goals: Pt to meet >50% of nutritional requirements from diet and ONS

## 2020-11-24 NOTE — DISCHARGE SUMMARY
Hospital Medicine Discharge Summary    Patient ID: Lucille Joy      Patient's PCP: Shima Rush Date: 11/18/2020     Discharge Date: 11/24/2020    Admitting Physician: Contreras Peterson MD     Discharge Physician: Juno Hope MD     Discharge Diagnoses: Active Hospital Problems    Diagnosis Date Noted    COVID-19 [U07.1] 11/18/2020     HPI Per admitting physician:  \"Patient is a 25-year-old female with past medical history of diabetes mellitus, hypertension who presents to the hospital for diarrhea. According to the patient she tested positive for Covid 19 infection last Sunday and she has been having diarrhea around 4-5 bowel movements every day, watery, nonbloody. Denies nausea vomiting. Patient also mentions she feels thirsty and feels like she has dizziness. Patient denies fevers chills chest pain shortness of breath nausea vomiting dysuria. \"    The patient was seen and examined on day of discharge and this discharge summary is in conjunction with any daily progress note from day of discharge. Hospital Course:    Patient seen and examined at the bedside. No complaints at this time. Discussed that her kidney function has stabilized. We discussed her clinical course. We discussed the option of starting allopurinol at discharge at a lower dose. She wishes to remain off allopurinol for the time being and discuss it with her primary care.     # Viral gastroenteritis, likely secondary to COVID-19  -likely caused dehydration and orthostasis  -continue IV fluids     # CHRISTINE, likely pre-renal  -Renal ultrasound showed bilateral normal sized kidneys  -nephrology following  -Cr improving (4.8>4.5>4.1>3.4>2.9>2.5), Cr 2.5 today  -s/p IV fluids    # Hypomagnesemia  -repleted, Mg WNL today     # Gout  -holding allopurinol  -uric acid 7.4  -analgesia available should she need it     # COVID 19 +  -no shortness of breath or hypoxemia, thus no indication for tx at this time  -Continue to monitor  -she should isolate at home until cleared by her primary care doctor     # T2DM  -sugars well controlled with mealtime and correctional insulin in-house  -transition back to prior medications at discharge  -carb control diet     # CAD s/p stent 2016  -troponin 0.06>0.05, no chest pain  -suspect this is due to CHRISTINE  -continue brilinta     # HTN  -continue hydralazine, metoprolol    Physical Exam Performed:     BP (!) 164/73   Pulse 67   Temp 98.2 °F (36.8 °C) (Oral)   Resp 16   Ht 5' (1.524 m)   Wt 229 lb 9.6 oz (104.1 kg)   SpO2 95%   BMI 44.84 kg/m²     Please see progress note from this date    Labs: For convenience and continuity at follow-up the following most recent labs are provided:      CBC:    Lab Results   Component Value Date    WBC 8.8 11/24/2020    HGB 10.0 11/24/2020    HCT 30.4 11/24/2020     11/24/2020       Renal:    Lab Results   Component Value Date     11/24/2020    K 3.6 11/24/2020     11/24/2020    CO2 22 11/24/2020    BUN 32 11/24/2020    CREATININE 2.5 11/24/2020    CALCIUM 9.0 11/24/2020         Significant Diagnostic Studies    Radiology:   US RENAL COMPLETE   Final Result      No hydronephrosis      If concern for renal or ureteral calculi CT abdomen and pelvis without contrast recommended. XR CHEST PORTABLE   Final Result       Slight bibasilar opacities. Cardiomegaly. Consults:     IP CONSULT TO HOSPITALIST  IP CONSULT TO NEPHROLOGY  IP CONSULT TO PHARMACY  IP CONSULT TO SOCIAL WORK    Disposition:  Home    Condition at Discharge: Stable    Discharge Instructions/Follow-up:  Follow up with PCP in 1 week for hospital follow-up and to review any pending labs/tests.     Please follow other discharge instructions as outlined in the discharge instructions    Code Status:  Full Code    Activity: activity as tolerated    Diet: renal diet    Discharge Medications:     Current Discharge Medication List           Details   aspirin 81 MG EC tablet Take 81 mg by mouth daily      glipiZIDE (GLUCOTROL XL) 2.5 MG extended release tablet Take 2.5 mg by mouth 2 times daily (with meals)      metoprolol tartrate (LOPRESSOR) 25 MG tablet Take 25 mg by mouth 2 times daily      hydrALAZINE (APRESOLINE) 50 MG tablet Take 50 mg by mouth 2 times daily      sertraline (ZOLOFT) 50 MG tablet Take 50 mg by mouth daily      vitamin D (CHOLECALCIFEROL) 25 MCG (1000 UT) TABS tablet Take 1,000 Units by mouth daily      coenzyme Q10 100 MG CAPS capsule Take 100 mg by mouth daily      BRILINTA 60 MG TABS tablet TAKE 1 TABLET TWICE A DAY  Qty: 180 tablet, Refills: 1      REPATHA SURECLICK 657 MG/ML SOAJ INJECT CONTENTS OF 1 PEN SUBCUTANEOUSLY EVERY 14 DAYS  Qty: 2 pen, Refills: 5      clobetasol (TEMOVATE) 0.05 % cream Apply topically daily as needed Apply topically to vaginal area when needed             Time Spent on discharge is 35 minutes in the examination, evaluation, counseling and review of medications and discharge plan. Signed:    Garima Grey MD   11/24/2020      Thank you Adeline Goodpasture for the opportunity to be involved in this patient's care. If you have any questions or concerns please feel free to contact me at [phone].

## 2020-11-24 NOTE — PROGRESS NOTES
Discharge note: Patient has been seen by doctor. Discharge order obtained, and discharge instructions reviewed. Patient educated, using the teach back method, about follow up instructions and discharge instructions. A completed copy of the AVS instructions given to patient and all questions answered. IV catheter removed without complaints, catheter intact, site WNL. Discharged to lobby via wheel chair with personal belongings. Pt's  will be driving pt to private residence.  Electronically signed by Adolfo Reilly RN on 11/24/2020 at 1:29 PM

## 2020-11-24 NOTE — PROGRESS NOTES
NUTRITION ASSESSMENT  Admission Date: 11/18/2020     Type and Reason for Visit: Initial    NUTRITION RECOMMENDATIONS:   1. PO Diet: Continue current CC diet, 4 choices/ meal  2. ONS: Adding Glucerna QD    NUTRITION ASSESSMENT:  RD assessment for LOS. Pt with PMHx of CAD, T2DM (Hgb A1C 7.2% on 11/18) and HTN who is admitted for diarrhea (likely r/t covid 19) and dehydration. Pt reports losing ~13 pounds in 1 month per chart review (RD unable to confirm as most weight Hx data is stated). Variable po intakes noted this admission. Pt in isolation plus and RD unable to reach pt via phone. Adding ONS QD to aid with meeting nutritional needs when intake of meals is inadequate and will continue to monitor per [facility]. MALNUTRITION ASSESSMENT  Context of Malnutrition: Acute Illness   Malnutrition Status: At risk for malnutrition (Comment)  Findings of the 6 clinical characteristics of malnutrition (Minimum of 2 out of 6 clinical characteristics is required to make the diagnosis of moderate or severe Protein Calorie Malnutrition based on AND/ASPEN Guidelines):  Energy Intake: Less than/equal to 75% of estimated energy requirements    Energy Intake Time: Greater than or equal to 5 days    Weight Loss %: Unable to assess    Weight loss Time: Greater than or equal to 3 months   Due to current CDC guidelines recommending 6-ft distancing for social isolation for COVID19 prevention, physical aspects of the malnutrition assessment were withheld at this time.      NUTRITION DIAGNOSIS   Problem: Problem #1: Inadequate oral intake  Etiology: Decreased ability to consume sufficient energy   Signs & Symptoms: Diarrhea, Diet history of poor intake , Intake 0-25%, Intake 26-50%, Patient report of  and Weight loss     NUTRITION INTERVENTION  Food and/or Nutrient Delivery:Continue Current diet  or Start ONS   Nutrition education/counseling/coordination of care: Continue Inpatient Monitoring     NUTRITION MONITORING & EVALUATION:  Evaluation:Goals set   Goals:Goals: Pt to meet >50% of nutritional requirements from diet and ONS  Monitoring: Meal Intake , Pertinent Labs , Supplement Intake  or Weight      OBJECTIVE DATA:  · Nutrition-Focused Physical Findings: LBM 11/20 (formed). No edema. POC gluc 108 on 11/24  · Wounds None      Past Medical History:   Diagnosis Date    CAD (coronary artery disease)     CAD (coronary artery disease) 10/20/2016    Distal RCA 3.0 x 15 mm Alpine CRISTEL    Essential hypertension         ANTHROPOMETRICS  Current Height: 5' (152.4 cm)  Current Weight: 229 lb 9.6 oz (104.1 kg)    Admission weight: 235 lb (106.6 kg)  Ideal Bodyweight 100 lb   Usual Bodyweight 235 per SRUTHI   Adjusted Bodyweight n/a  Weight Changes Reported -13 within past month       BMI BMI (Calculated): 44.9    Wt Readings from Last 50 Encounters:   11/21/20 229 lb 9.6 oz (104.1 kg)   06/24/20 235 lb (106.6 kg)   02/14/20 233 lb (105.7 kg)   07/17/19 234 lb (106.1 kg)   01/16/19 232 lb 3.2 oz (105.3 kg)       COMPARATIVE STANDARDS  Estimated Total Kcals/Day : 8-15 Current Bodyweight (104 kg) 830-1560 kcal    Estimated Total Protein (g/day) : 2.0 Ideal Bodyweight  (45.5 kg) 90 g/day  Estimated Daily Total Fluid (ml/day): >1500 mL per day     Food / Nutrition-Related History  Pre-Admission / Home Diet:  Pre-Admission/Home Diet: Carb Control   Home Supplements / Herbals:    none noted  Food Restrictions / Cultural Requests:    none noted    Current Nutrition Therapies   DIET GENERAL; Carb Control: 4 carb choices (60 gms)/meal     PO Intake: 1-25%, 26-50%, 51-75% and %  PO Supplement: None   PO Supplement Intake: None   IVF: NaCl @ 75 ml/hr     NUTRITION RISK LEVEL: Risk Level:  Moderate     Jordi Girard, [address] [place] [address],   [place]:  [phone]  Office:  [phone]

## 2020-11-24 NOTE — CARE COORDINATION
Case Management Assessment            Discharge Note                    Date / Time of Note: 11/24/2020 11:20 AM                  Discharge Note Completed by: Irma Noble    Patient Name: Yael Muñoz   YOB: 1950  Diagnosis: COVID-19 [U07.1]  COVID-19 [U07.1]   Date / Time: 11/18/2020  5:08 AM    Current PCP: Frandy Mclean patient: No    Hospitalization in the last 30 days: No    Advance Directives:  Code Status: Full Code  [place] DNR form completed and on chart: Not Indicated    Financial:  Payor: Krystina Knowlescil / Plan: George Arriola PPO / Product Type: Medicare /      Pharmacy:    [address], [place] [phone] - F [phone]  [address] [place] [address]  Phone: [phone] Fax: [phone]    [address] [place] [address] [phone] F [phone]  [facility] [address]  Phone: [phone] Fax: [phone]    [address] [place] [address] [phone] f [phone]  [facility] [address]  Phone: [phone] Fax: [phone]    [id number] D Maryjane Henley [address] [place] [address] [phone] Milo De Luna [phone]  [address] [place] [address]  [address] [place] [address]  Phone: [phone] Fax: [phone]      Assistance purchasing medications?: Potential Assistance Purchasing Medications: No  Assistance provided by Case Management: None at this time    Does patient want to participate in local refill/ meds to beds program?: No    Meds To Beds General Rules:  1. Can ONLY be done Monday- Friday between 8:30am-5pm  2. Prescription(s) must be in pharmacy by 3pm to be filled same day  3. Copy of patient's insurance/ prescription drug card and patient face sheet must be sent along with the prescription(s)  4. Cost of Rx cannot be added to hospital bill.  If financial assistance is needed, please contact unit  or ;  or  CANNOT provide pharmacy voucher for patients co-pays  5. Patients can then  the prescription on their way out of the hospital at discharge, or pharmacy can deliver to the bedside if staff is available. (payment due at time of pick-up or delivery - cash, check, or card accepted)     Able to afford home medications/ co-pay costs: Yes    ADLS:  Current PT AM-PAC Score: 22 /24  Current OT AM-PAC Score: 23 /24      DISCHARGE Disposition: Home- No Services Needed    LOC at discharge: Not Applicable  MU Completed: Not Indicated    Notification completed in HENS/PAS?:  Not Applicable    IMM Completed:   No    Transportation:  Transportation PLAN for discharge: family   Mode of Transport: [address]:  [address] ordered at discharge: Not [address]: Not Applicable  Orders faxed: No    Durable Medical Equipment:  DME Provider:    Equipment obtained during hospitalization: none    Home Oxygen and Respiratory Equipment:  Oxygen needed at discharge?: Not [address]: Not Applicable  Portable tank available for discharge?: Not Indicated    Dialysis:  Dialysis patient: No    Dialysis Center:  Not Applicable    Hospice Services:  Location: Not Applicable  Agency: Not Applicable    Consents signed: Not Indicated    Referrals made at [facility] for outpatient continued care:  Not Applicable    Additional CM Notes:  Cm following. Dc home today no needs.      The Plan for Transition of Care is related to the following treatment goals of COVID-19 [U07.1]  COVID-19 [U07.1]    The Patient and/or patient representative Estephania Fernandez and her family were provided with a choice of provider and agrees with the discharge plan Not Indicated    Freedom of choice list was provided with basic dialogue that supports the patient's individualized plan of care/goals and shares the quality data associated with the providers.  Not Indicated    Care Transitions patient: Rosario Greene, AGUSTÍN  [facility]  Case Management Department  Ph: [phone]

## 2020-11-24 NOTE — PROGRESS NOTES
Hospitalist Progress Note      PCP: Wilfrido Elias    Date of Admission: 11/18/2020    Chief Complaint: diarrhea, malaise    Subjective:  Patient seen and examined at the bedside. No complaints at this time. Discussed that her kidney function has stabilized. We discussed her clinical course. We discussed the option of starting allopurinol at discharge at a lower dose. She wishes to remain off allopurinol for the time being and discuss it with her primary care.     PFHS: reviewed as documented 11/18/2020, no changes    Medications:  Reviewed    Infusion Medications    sodium chloride 75 mL/hr at 11/24/20 0204    dextrose       Scheduled Medications    Evolocumab  140 mg Subcutaneous Q14 Days    lactobacillus  1 capsule Oral Daily    heparin (porcine)  5,000 Units Subcutaneous 3 times per day    sodium chloride flush  10 mL Intravenous 2 times per day    aspirin EC  81 mg Oral Daily    ticagrelor  60 mg Oral BID    metoprolol tartrate  25 mg Oral BID    sertraline  50 mg Oral Daily    hydrALAZINE  50 mg Oral BID    Vitamin D  1,000 Units Oral Daily    insulin lispro  0-12 Units Subcutaneous TID WC    insulin lispro  0-6 Units Subcutaneous Nightly     PRN Meds: melatonin, sodium chloride flush, acetaminophen **OR** acetaminophen, magnesium hydroxide, promethazine **OR** ondansetron, glucose, dextrose, glucagon (rDNA), dextrose      Intake/Output Summary (Last 24 hours) at 11/24/2020 9588  Last data filed at 11/24/2020 0800  Gross per 24 hour   Intake 2961 ml   Output --   Net 2961 ml         Physical Exam Performed:    BP (!) 164/73   Pulse 67   Temp 98.2 °F (36.8 °C) (Oral)   Resp 16   Ht 5' (1.524 m)   Wt 229 lb 9.6 oz (104.1 kg)   SpO2 95%   BMI 44.84 kg/m²     General appearance:  No apparent distress, appears stated age  Eyes: Pupils equal, round, reactive to light, conjunctiva/corneas clear  Ears/Nose/Mouth/Throat: No external lesions or scars, hearing intact to voice  Neck: Trachea midline, no masses noted  Respiratory:  Normal respiratory effort. Clear to auscultation bilaterally  Cardiovascular: Regular rate and rhythm, nl S1/S2, w/o murmurs, rubs or gallops, no lower extremity edema  Abdomen: Soft, non-tender, non-distended, no hepatosplenomegaly  Musculoskeletal: No cyanosis, clubbing or petechiae, no lower extremity misalignment, asymmetry, or crepitation  Skin: Normal skin color, texture, turgor. No rashes or lesions noted. Psychiatric: Alert and oriented x4, good insight and judgment    Labs:   Recent Labs     11/22/20  0551 11/23/20  0638 11/24/20  0425   WBC 8.1 7.9 8.8   HGB 9.7* 10.3* 10.0*   HCT 29.1* 31.1* 30.4*    397 400     Recent Labs     11/22/20  0925 11/23/20  0638 11/24/20  0425    140 137   K 3.7 3.5 3.6    105 105   CO2 26 25 22   BUN 44* 36* 32*   CREATININE 2.9* 2.5* 2.5*   CALCIUM 8.8 8.6 9.0     No results for input(s): AST, ALT, BILIDIR, BILITOT, ALKPHOS in the last 72 hours. No results for input(s): INR in the last 72 hours. No results for input(s): Shellia Bugler in the last 72 hours. Urinalysis:      Lab Results   Component Value Date    NITRU Negative 11/18/2020    WBCUA 3-5 11/18/2020    BACTERIA 2+ 11/18/2020    RBCUA 0-2 11/18/2020    BLOODU SMALL 11/18/2020    SPECGRAV 1.025 11/18/2020    GLUCOSEU Negative 11/18/2020       Radiology:  US RENAL COMPLETE   Final Result      No hydronephrosis      If concern for renal or ureteral calculi CT abdomen and pelvis without contrast recommended. XR CHEST PORTABLE   Final Result       Slight bibasilar opacities. Cardiomegaly.           Assessment/Plan:    Active Hospital Problems    Diagnosis Date Noted    COVID-19 [U07.1] 11/18/2020       Plan:    # Viral gastroenteritis, likely secondary to COVID-19  -likely caused dehydration and orthostasis  -continue IV fluids     # CHRISTINE, likely pre-renal  -Renal ultrasound showed bilateral normal sized kidneys  -nephrology following  -Cr improving (4.8>4.5>4.1>3.4>2.9>2.5), Cr 2.5 today  -s/p IV fluids    # Hypomagnesemia  -repleted, Mg WNL today     # Gout  -holding allopurinol  -uric acid 7.4  -analgesia available should she need it     # COVID 19 +  -no shortness of breath or hypoxemia, thus no indication for tx at this time  -Continue to monitor  -she should isolate at home until cleared by her primary care doctor     # T2DM  -sugars well controlled with mealtime and correctional insulin in-house  -transition back to prior medications at discharge  -carb control diet     # CAD s/p stent 2016  -troponin 0.06>0.05, no chest pain  -suspect this is due to CHRISTINE  -continue brilinta     # HTN  -continue hydralazine, metoprolol    DVT Prophylaxis: heparin  Diet: DIET GENERAL; Carb Control: 4 carb choices (60 gms)/meal  Code Status: Full Code    PT/OT Eval Status: complete    Dispo - discharge home today    Umesh Epps MD

## 2020-11-24 NOTE — PROGRESS NOTES
Physician Progress Note      PATIENT:               Joseph Raymundo  CSN #:                  [id number]  :                       1950  ADMIT DATE:       2020 5:08 AM  DISCH DATE:  RESPONDING  PROVIDER #:        Buster Scheuermann MD          QUERY TEXT:    Dear Attending Provider,    Patient admitted with COVID-19 gastroenteritis, CHRISTINE and acidosis. Noted   documentation of NSTEMI by Nephrology and \"Elevated troponin in the setting of   CHRISTINE, no chest pain\" by attending. If possible, please document in progress   notes and discharge summary the status of NSTEMI and if positive, type of   NSTEMI:    The medical record reflects the following:  Risk Factors: CHRISTINE, acidosis, CAD  Clinical Indicators: Per ED: \"EKG: NSR, No acute ST or T wave changes. Overall, normal EKG; noted to have fairly significant acute renal failure may   indicate that her renal failure is contributing to difficulty clearing   troponin and that may be the cause for elevation\"; Per Nephrology \"NSTEMI-   management per primary\". Pt denies chest pain, no symptoms of ischemia noted,   no EKG changes. Trops: 0.06 x 3, no rise/ fall  Treatment: 1L LR bolus, Bicarb gtt  Options provided:  -- NSTEMI was ruled out  -- NSTEMI type 2 due to CHRISTINE, present as evidenced by, Please document   evidence. -- Other - I will add my own diagnosis  -- Disagree - Not applicable / Not valid  -- Disagree - Clinically unable to determine / Unknown  -- Refer to Clinical Documentation Reviewer    PROVIDER RESPONSE TEXT:    NSTEMI was ruled out after study. Query created by:  Yesenia Foreman on 2020 7:53 AM      Electronically signed by:  Buster Scheuermann MD 2020 10:29 AM

## 2020-11-24 NOTE — PLAN OF CARE
Problem: Airway Clearance - Ineffective  Goal: Achieve or maintain patent airway  Outcome: Ongoing   Pt remains on room air during shift with SpO2>92%. Pt wears home BiPAP when asleep. Will continue to assess and monitor pt's oxygenation levels. Problem: Fluid Volume:  Goal: Ability to achieve a balanced intake and output will improve  Description: Ability to achieve a balanced intake and output will improve  Outcome: Ongoing   Pt continues to be on 75 ml/hr NS IVF. Pt's intake and urine occurrences are being recorded in intake/output flowsheets. Problem: Pain:  Goal: Control of chronic pain  Description: Control of chronic pain  Outcome: Ongoing   Pt reported pain in feet and ankle and requested tylenol for the aching pain. Pt reported improvement in pain after administration. Will continue to assess pt's pain level.

## 2020-11-25 ENCOUNTER — CARE COORDINATION (OUTPATIENT)
Dept: CASE MANAGEMENT | Age: 70
End: 2020-11-25

## 2020-11-25 NOTE — CARE COORDINATION
[facility] Transitions Initial Follow Up Call    Call within 2 business days of discharge:  YES     Patient: Broderick Cordova Patient : 1950   MRN: [id number]   Reason for Admission: PNEUMONIA - COVID 19 MONITORING   Discharge Date: 20 RARS: Readmission Risk Score: 16      Last Discharge [address] [address]       Complaint Diagnosis Description Type Department Provider    20 Dizziness; Other NSTEMI (non-ST elevated myocardial infarction) ([facility] [address] . .. ED to Hosp-Admission (Discharged) (ADMITTED) Keara Moser 4 U Hannah Hines MD; Yana Aviles. .. Spoke with: [address] [place] [address] Stret: [facility], [facility].      Non-face-to-face services provided:  Obtained and reviewed discharge summary and/or continuity of care documents  Education of patient/family/caregiver/guardian to support self-management-   Assessment and support for treatment adherence and medication management-        Care Transitions 24 Hour Call    Do you have any ongoing symptoms?:  No  Do you have a copy of your discharge instructions?:  Yes  Do you have all of your prescriptions and are they filled?:  Yes  Have you been contacted by a [address]?:  No  Have you scheduled your follow up appointment?:  Yes  Were you discharged with any Home Care or Post Acute Services:  No  Do you feel like you have everything you need to keep you well at home?:  Yes  Care Transitions Interventions       Challenges to be reviewed by the provider   Additional needs identified to be addressed with provider:  YES     Discussed COVID-19 related testing which was available at this time. Test results were available    Patient informed of results, if available? Yes               Method of communication with provider :   phone    Care Transition Nurse (CTN) contacted the patient by telephone to perform post hospital discharge assessment. Verified name with patient as identifier. Provided introduction to self, and explanation of the CTN role. chest pain, SOB, cough, congestion, pain, and difficulty emptying bladder. LBM was today. Pt declined to review all meds with CTC but did review stopped meds. Pt reports he has appt with PCP on Monday at noon and will call today and schedule appt with nephrologist.      Pt will take all meds as prescribed and schedule / keep doctors appt. CTC provided education on s/s that require medical attention and when to seek medical attention. CTC advised Pt of use urgent care or physicians 24 hr access line if assistance is needed after hours or on the weekend. Pt denies any needs or concerns and is agreeable with additional calls.           Follow Up  Future Appointments   Date Time Provider Zari Yuan   12/15/2020 10:00 AM Beltran Contreras MD [facility] GWENDOLYN Prasad RN

## 2020-12-04 ENCOUNTER — CARE COORDINATION (OUTPATIENT)
Dept: CASE MANAGEMENT | Age: 70
End: 2020-12-04

## 2020-12-07 NOTE — CARE COORDINATION
[facility] Trans[facility] Initial Follow Up Call    2020    Patient:  Myke Tucker Patient :  1950  MRN:  [id number]   Reason for Admission:  covid 19 monitoring   Discharge Date:  20  RARS:       CTC attempt to reach Pt regarding recent hospital discharge. CTC left voice recording with call back number requesting a call back. Follow up appointments:    Future Appointments   Date Time Provider Zari Yuan   12/15/2020 10:00 AM Ayana Martinez MD [facility]       MEI SnellN, RN  Care Transition Coordinator  Contact Number:  [phone]

## 2020-12-08 ENCOUNTER — CARE COORDINATION (OUTPATIENT)
Dept: CASE MANAGEMENT | Age: 70
End: 2020-12-08

## 2020-12-11 NOTE — CARE COORDINATION
concerns about COVID-19 and your underlying condition or if you are sick. Pt will take all meds as prescribed and schedule / keep doctors appt. CTC provided education on s/s that require medical attention and when to seek medical attention. [facility] advised Pt of use urgent care or physicians 24 hr access line if assistance is needed after hours or on the weekend. Pt denies any needs or concerns and is agreeable with additional calls.     Follow Up  Future Appointments   Date Time Provider Zari Yuan   12/15/2020 10:00 AM Dora Vicente MD [facility] GWENDOLYN Campbell RN\

## 2020-12-15 ENCOUNTER — CARE COORDINATION (OUTPATIENT)
Dept: CASE MANAGEMENT | Age: 70
End: 2020-12-15

## 2020-12-15 ENCOUNTER — OFFICE VISIT (OUTPATIENT)
Dept: CARDIOLOGY CLINIC | Age: 70
End: 2020-12-15
Payer: MEDICARE

## 2020-12-15 VITALS
BODY MASS INDEX: 41.62 KG/M2 | WEIGHT: 212 LBS | DIASTOLIC BLOOD PRESSURE: 80 MMHG | HEART RATE: 68 BPM | SYSTOLIC BLOOD PRESSURE: 128 MMHG | TEMPERATURE: 97.1 F | HEIGHT: 60 IN

## 2020-12-15 PROCEDURE — 99214 OFFICE O/P EST MOD 30 MIN: CPT | Performed by: INTERNAL MEDICINE

## 2020-12-15 NOTE — PROGRESS NOTES
lesions. MUSCULOSKELETAL: No new muscle or joint pain. NEUROLOGICAL: No syncope or TIA-like symptoms. PSYCHIATRIC: No anxiety, pain, insomnia or depression    Objective:   PHYSICAL EXAM:        VITALS:    Wt Readings from Last 3 Encounters:   12/15/20 212 lb (96.2 kg)   11/21/20 229 lb 9.6 oz (104.1 kg)   06/24/20 235 lb (106.6 kg)     BP Readings from Last 3 Encounters:   12/15/20 128/80   11/24/20 (!) 158/70   06/24/20 124/84     Pulse Readings from Last 3 Encounters:   12/15/20 68   11/24/20 68   06/24/20 60       CONSTITUTIONAL: Cooperative, no apparent distress, and appears well nourished / developed  NEUROLOGIC:  Awake and orientated to person, place and time. PSYCH: Calm affect. SKIN: Warm and dry. HEENT: Sclera non-icteric, normocephalic, neck supple, no elevation of JVP, normal carotid pulses with no bruits and thyroid normal size. LUNGS:  No increased work of breathing and clear to auscultation, no crackles or wheezing  CARDIOVASCULAR:  Regular rate and rhythm with no murmurs, gallops, rubs, or abnormal heart sounds, normal PMI. The apical impulses not displaced  Heart tones are crisp and normal  Cervical veins are not engorged  The carotid upstroke is normal in amplitude and contour without delay or bruit  JVP is not elevated  ABDOMEN:  Normal bowel sounds, non-distended and non-tender to palpation  EXT: No edema, no calf tenderness. Pulses are present bilaterally.     DATA:    Lab Results   Component Value Date    ALT 34 07/17/2019    AST 21 07/17/2019    ALKPHOS 80 07/17/2019    BILITOT <0.2 07/17/2019     Lab Results   Component Value Date    CREATININE 2.5 (H) 11/24/2020    BUN 32 (H) 11/24/2020     11/24/2020    K 3.6 11/24/2020     11/24/2020    CO2 22 11/24/2020     No results found for: TSH, J0ESKGG, P1VKPKT, THYROIDAB  Lab Results   Component Value Date    WBC 8.8 11/24/2020    HGB 10.0 (L) 11/24/2020    HCT 30.4 (L) 11/24/2020    MCV 95.6 11/24/2020     11/24/2020     No

## 2020-12-16 LAB — SARS-COV-2, IGG: POSITIVE

## 2020-12-17 NOTE — CARE COORDINATION
[facility] Trans[facility] Initial Follow Up Call      Patient:  Carlos Manuel Escalona Patient :  1950  MRN:  [id number]   Reason for Admission:  covid 19 monitorring   Discharge Date:  20  RARS:       CTC attempt to reach Pt regarding recent hospital discharge. CTC unable to leave voice recording with call back number requesting a call back / no answer. Follow up appointments:    Future Appointments   Date Time Provider Zari Yuan   2021 10:00 AM Jacob Magana MD [facility]       JOEL Lawson, RN  Care Transition Coordinator  Contact Number:  [phone]

## 2021-03-03 RX ORDER — TICAGRELOR 60 MG/1
TABLET ORAL
Qty: 180 TABLET | Refills: 3 | Status: SHIPPED | OUTPATIENT
Start: 2021-03-03 | End: 2022-02-01 | Stop reason: SDUPTHER

## 2021-03-12 RX ORDER — EVOLOCUMAB 140 MG/ML
INJECTION, SOLUTION SUBCUTANEOUS
Qty: 2 PEN | Refills: 6 | Status: SHIPPED | OUTPATIENT
Start: 2021-03-12 | End: 2022-08-09 | Stop reason: SDUPTHER

## 2021-06-17 ENCOUNTER — HOSPITAL ENCOUNTER (OUTPATIENT)
Dept: CT IMAGING | Age: 71
Discharge: HOME | End: 2021-06-17
Payer: MEDICARE

## 2021-06-17 VITALS
OXYGEN SATURATION: 97 % | DIASTOLIC BLOOD PRESSURE: 73 MMHG | HEART RATE: 66 BPM | TEMPERATURE: 97.5 F | RESPIRATION RATE: 16 BRPM | SYSTOLIC BLOOD PRESSURE: 154 MMHG

## 2021-06-17 DIAGNOSIS — N17.9 AKI (ACUTE KIDNEY INJURY) (HCC): ICD-10-CM

## 2021-06-17 LAB
APTT: 33.4 SEC (ref 24.2–36.2)
INR BLD: 0.97 (ref 0.86–1.14)
PLATELET # BLD: 308 K/UL (ref 135–450)
PROTHROMBIN TIME: 11.3 SEC (ref 10–13.2)

## 2021-06-17 PROCEDURE — 6370000000 HC RX 637 (ALT 250 FOR IP): Performed by: RADIOLOGY

## 2021-06-17 PROCEDURE — 6360000002 HC RX W HCPCS: Performed by: RADIOLOGY

## 2021-06-17 PROCEDURE — 85610 PROTHROMBIN TIME: CPT

## 2021-06-17 PROCEDURE — 7100000010 HC PHASE II RECOVERY - FIRST 15 MIN

## 2021-06-17 PROCEDURE — 2709999900 CT BIOPSY RENAL

## 2021-06-17 PROCEDURE — 85730 THROMBOPLASTIN TIME PARTIAL: CPT

## 2021-06-17 PROCEDURE — 88305 TISSUE EXAM BY PATHOLOGIST: CPT

## 2021-06-17 PROCEDURE — 7100000011 HC PHASE II RECOVERY - ADDTL 15 MIN

## 2021-06-17 PROCEDURE — 36415 COLL VENOUS BLD VENIPUNCTURE: CPT

## 2021-06-17 PROCEDURE — 2500000003 HC RX 250 WO HCPCS: Performed by: RADIOLOGY

## 2021-06-17 PROCEDURE — 85049 AUTOMATED PLATELET COUNT: CPT

## 2021-06-17 RX ORDER — FENTANYL CITRATE 50 UG/ML
50 INJECTION, SOLUTION INTRAMUSCULAR; INTRAVENOUS ONCE
Status: COMPLETED | OUTPATIENT
Start: 2021-06-17 | End: 2021-06-17

## 2021-06-17 RX ORDER — HYDRALAZINE HYDROCHLORIDE 20 MG/ML
10 INJECTION INTRAMUSCULAR; INTRAVENOUS ONCE
Status: COMPLETED | OUTPATIENT
Start: 2021-06-17 | End: 2021-06-17

## 2021-06-17 RX ORDER — ACETAMINOPHEN 325 MG/1
650 TABLET ORAL EVERY 4 HOURS PRN
Status: DISCONTINUED | OUTPATIENT
Start: 2021-06-17 | End: 2021-06-18 | Stop reason: HOSPADM

## 2021-06-17 RX ORDER — MIDAZOLAM HYDROCHLORIDE 1 MG/ML
1 INJECTION INTRAMUSCULAR; INTRAVENOUS ONCE
Status: COMPLETED | OUTPATIENT
Start: 2021-06-17 | End: 2021-06-17

## 2021-06-17 RX ORDER — ONDANSETRON 2 MG/ML
4 INJECTION INTRAMUSCULAR; INTRAVENOUS ONCE
Status: DISCONTINUED | OUTPATIENT
Start: 2021-06-17 | End: 2021-06-17

## 2021-06-17 RX ORDER — LIDOCAINE HYDROCHLORIDE 10 MG/ML
15 INJECTION, SOLUTION EPIDURAL; INFILTRATION; INTRACAUDAL; PERINEURAL ONCE
Status: COMPLETED | OUTPATIENT
Start: 2021-06-17 | End: 2021-06-17

## 2021-06-17 RX ORDER — FENTANYL CITRATE 50 UG/ML
25 INJECTION, SOLUTION INTRAMUSCULAR; INTRAVENOUS ONCE
Status: COMPLETED | OUTPATIENT
Start: 2021-06-17 | End: 2021-06-17

## 2021-06-17 RX ORDER — MORPHINE SULFATE 2 MG/ML
2 INJECTION, SOLUTION INTRAMUSCULAR; INTRAVENOUS ONCE
Status: COMPLETED | OUTPATIENT
Start: 2021-06-17 | End: 2021-06-17

## 2021-06-17 RX ORDER — PROMETHAZINE HYDROCHLORIDE 25 MG/ML
12.5 INJECTION, SOLUTION INTRAMUSCULAR; INTRAVENOUS ONCE
Status: COMPLETED | OUTPATIENT
Start: 2021-06-17 | End: 2021-06-17

## 2021-06-17 RX ORDER — ONDANSETRON 2 MG/ML
4 INJECTION INTRAMUSCULAR; INTRAVENOUS ONCE
Status: COMPLETED | OUTPATIENT
Start: 2021-06-17 | End: 2021-06-17

## 2021-06-17 RX ADMIN — ACETAMINOPHEN 650 MG: 325 TABLET ORAL at 15:08

## 2021-06-17 RX ADMIN — HYDRALAZINE HYDROCHLORIDE 10 MG: 20 INJECTION INTRAMUSCULAR; INTRAVENOUS at 10:30

## 2021-06-17 RX ADMIN — MORPHINE SULFATE 2 MG: 2 INJECTION, SOLUTION INTRAMUSCULAR; INTRAVENOUS at 12:53

## 2021-06-17 RX ADMIN — PROMETHAZINE HYDROCHLORIDE 12.5 MG: 25 INJECTION INTRAMUSCULAR; INTRAVENOUS at 11:05

## 2021-06-17 RX ADMIN — FENTANYL CITRATE 25 MCG: 50 INJECTION, SOLUTION INTRAMUSCULAR; INTRAVENOUS at 11:01

## 2021-06-17 RX ADMIN — MIDAZOLAM 1 MG: 1 INJECTION INTRAMUSCULAR; INTRAVENOUS at 10:33

## 2021-06-17 RX ADMIN — LIDOCAINE HYDROCHLORIDE 15 ML: 10 INJECTION, SOLUTION EPIDURAL; INFILTRATION; INTRACAUDAL; PERINEURAL at 10:33

## 2021-06-17 RX ADMIN — FENTANYL CITRATE 50 MCG: 50 INJECTION INTRAMUSCULAR; INTRAVENOUS at 10:42

## 2021-06-17 RX ADMIN — ONDANSETRON 4 MG: 2 INJECTION INTRAMUSCULAR; INTRAVENOUS at 11:01

## 2021-06-17 ASSESSMENT — PAIN SCALES - WONG BAKER
WONGBAKER_NUMERICALRESPONSE: 6
WONGBAKER_NUMERICALRESPONSE: 0
WONGBAKER_NUMERICALRESPONSE: 4

## 2021-06-17 ASSESSMENT — PAIN SCALES - GENERAL
PAINLEVEL_OUTOF10: 7
PAINLEVEL_OUTOF10: 0
PAINLEVEL_OUTOF10: 6
PAINLEVEL_OUTOF10: 5
PAINLEVEL_OUTOF10: 6
PAINLEVEL_OUTOF10: 6

## 2021-06-17 ASSESSMENT — PAIN DESCRIPTION - ONSET
ONSET: ON-GOING
ONSET: ON-GOING

## 2021-06-17 ASSESSMENT — PAIN DESCRIPTION - DESCRIPTORS
DESCRIPTORS: SHARP
DESCRIPTORS: STABBING
DESCRIPTORS: ACHING
DESCRIPTORS: ACHING;SHARP

## 2021-06-17 ASSESSMENT — PAIN DESCRIPTION - PAIN TYPE
TYPE: ACUTE PAIN
TYPE: OTHER (COMMENT)
TYPE: SURGICAL PAIN

## 2021-06-17 ASSESSMENT — PAIN DESCRIPTION - FREQUENCY
FREQUENCY: CONTINUOUS
FREQUENCY: INTERMITTENT
FREQUENCY: INTERMITTENT

## 2021-06-17 ASSESSMENT — PAIN DESCRIPTION - ORIENTATION
ORIENTATION: RIGHT;LOWER
ORIENTATION: RIGHT;POSTERIOR
ORIENTATION: RIGHT;POSTERIOR

## 2021-06-17 ASSESSMENT — PAIN DESCRIPTION - LOCATION: LOCATION: BACK

## 2021-06-17 NOTE — PROGRESS NOTES
Report called to [address] [facility] and [address] [facility]. Pt is toileted and voiding more clear, pink urine. No blood clots and pt tolerating well. Rates pian 5/10 and posterior/ right biopsy site is clear and dry. No bloody drain noted. BP is still elevated and asymptomatic. Pt takes BP pills twice a day. Denies chest pain and no signs of resp distress. Took few bites of popcile and nausea is slightly resolved.

## 2021-06-17 NOTE — PROGRESS NOTES
Late entry 1205  Pt had a large BM x 1 per bedpan. Biopsy site posterior right side with tegaderm is dry.  Pt able to roll on her side with less assist.

## 2021-06-17 NOTE — H&P
Patient:  Suman Downs   :   1950      Relevant clinical history, particularly as it involves the pending procedure, was reviewed and discussed. The procedure including risks and benefits was discussed at length with the patient (or designated family member) and all questions were answered. Informed consent to proceed with the procedure was given. Vital signs were monitored and documented by the Radiology nurse. Targeted physical examination  Heart : regular rate and rhythm  Lungs : clear, breathing easily  Condition : stable    [facility] nurses notes reviewed and agreed.     Past Medical History:        Diagnosis Date    CAD (coronary artery disease)     CAD (coronary artery disease) 10/20/2016    Distal RCA 3.0 x 15 mm [place] CRISTEL    Essential hypertension        Past Surgical History:           Procedure Laterality Date    ANGIOPLASTY      GS    APPENDECTOMY      BREAST BIOPSY       SECTION      CHOLECYSTECTOMY      HERNIA REPAIR         Allergies:  Crestor [rosuvastatin calcium], Statins, and Repatha [evolocumab]    Medications:   Home Meds  Current Outpatient Medications on File Prior to Encounter   Medication Sig Dispense Refill    Evolocumab (REPATHA SURECLICK) 944 MG/ML SOAJ INJECT CONTENTS OF 1 PEN SUBCUTANEOUSLY EVERY 14 DAYS 2 pen 6    BRILINTA 60 MG TABS tablet TAKE 1 TABLET TWICE A  tablet 3    aspirin 81 MG EC tablet Take 81 mg by mouth daily      glipiZIDE (GLUCOTROL XL) 2.5 MG extended release tablet Take 2.5 mg by mouth 2 times daily (with meals)      metoprolol tartrate (LOPRESSOR) 25 MG tablet Take 25 mg by mouth 2 times daily      hydrALAZINE (APRESOLINE) 50 MG tablet Take 50 mg by mouth 2 times daily      sertraline (ZOLOFT) 50 MG tablet Take 50 mg by mouth daily      vitamin D (CHOLECALCIFEROL) 25 MCG (1000 UT) TABS tablet Take 1,000 Units by mouth daily      coenzyme Q10 100 MG CAPS capsule Take 100 mg by mouth daily       No current

## 2021-06-17 NOTE — PROGRESS NOTES
Pt still c/o pain 8/10 to her back from biopsy site. Voiding bloody urine discharge and strings of blood. Humza Moody RN was called and notified. No new orders placed.

## 2021-06-17 NOTE — PROGRESS NOTES
Bedside report taken pt on bedrest for 3hr until 1400 and can be discharge at 1500. NPO for an hour and may advance diet as tolerated. Vitals taken and noted. Late entry 1130  Biopsy site is dry.

## 2021-06-17 NOTE — PROCEDURES
IR Brief Postoperative Note    Inderjit Douglas  YOB: 1950  [id number]    Pre-operative Diagnosis: acute renal failure    Post-operative Diagnosis: Same    Procedure: nontarget renal bx, right    Anesthesia: moderate sed    Surgeons/Assistants: jason    Estimated Blood Loss: Minimal    Complications: none    Specimens: were not obtained    See full procedure dictation to follow      Jose Munroe MD MD  6/17/2021

## 2021-06-17 NOTE — SEDATION DOCUMENTATION
IMAGING SERVICES NURSING PROGRESS NOTE    Procedure:  rENAL bIOPSY  June 17, 2021  [place] Flatness      Allergies: Allergies   Allergen Reactions    Crestor [Rosuvastatin Calcium]     Statins     Repatha [Evolocumab] Rash       There were no vitals filed for this visit. Recent lab work reviewed with MD: yes    Procedure explained to patient by MD: yes   Informed consent obtained:yes  Family with patient:YES    Mental Status:  Normal  Readiness to learn:  Yes  Barriers to learning: No    Pain Assessment Pre-Procedure:  Pain Present:  no  Pain Score:  0  Pain Quality/Description:  NA    Time out Procedure Verification with:  [x] RN  [x] Physician  [x] Patient  [x] Other: CT Technologist  Procedure site marked, if applicable:  Yes    Note: Patient arrived A & O x 4, denies pain, breathing easily on room air, Spoke to Dr. Lashay Gamez prior to procedure. Procedural sedation:  Fentanyl:  50mcg  Versed:    1mg  Post Procedureal Note:  Patient tolerated procedure well. Breathing easily on room air. Report given to Denver Flaherty RN. Patient transported in stable conditon to room 10.     Pain Assessment Post-Procedure:  Pain Present:  no  Pain Score:  0  Pain Quality/Description:  NA    Plan of Care Goals:  Safety measures met:  Yes  Patient understands explanation of procedure:  Yes    Time in:  1017  Time out:  433 Abbey Warner RN.  R.NAlma Rosa 6/17/2021

## 2021-08-16 ENCOUNTER — OFFICE VISIT (OUTPATIENT)
Dept: CARDIOLOGY CLINIC | Age: 71
End: 2021-08-16
Payer: MEDICARE

## 2021-08-16 VITALS
HEART RATE: 62 BPM | HEIGHT: 65 IN | WEIGHT: 222 LBS | BODY MASS INDEX: 36.99 KG/M2 | DIASTOLIC BLOOD PRESSURE: 70 MMHG | SYSTOLIC BLOOD PRESSURE: 122 MMHG

## 2021-08-16 DIAGNOSIS — I10 ESSENTIAL HYPERTENSION: ICD-10-CM

## 2021-08-16 DIAGNOSIS — E78.00 PURE HYPERCHOLESTEROLEMIA: Primary | ICD-10-CM

## 2021-08-16 DIAGNOSIS — Z98.61 CORONARY ANGIOPLASTY STATUS: ICD-10-CM

## 2021-08-16 PROCEDURE — 99214 OFFICE O/P EST MOD 30 MIN: CPT | Performed by: INTERNAL MEDICINE

## 2021-08-16 NOTE — PROGRESS NOTES
[facility]   Dr Tahir Reilly. Shannon Garcia MD, [address] [facility]    Outpatient Follow Up Note    2021,9:25 AM  Subjective:   CHIEF COMPLAINT / HPI:  Follow Up secondary to hyperlipidemia and hypertension and coronary disease     Maria Hein is 70 y.o. female who presents today here today for follow-up and evaluation and generally doing well. She has some itching at her injection site for the Repatha that does not seem to go away. There is no rash apparently and she is wondering if she could have a half dose and still get the job done. On examination I find that she is essentially stable. Still heavy at 222. Blood pressure and vitals are good. Denies any chest pains or shortness of breath and is still active. Her daughter Evie Schafer interestingly was in school [address] [employer] with my son Martha Landeros vaccine x2 [place] and no issues   CAD with previous angioplasty stents.  and   Hyperlipidemia on Repatha  Intolerant statins lipitor and crestor and pravastatin  Covid 19+ 2020  CKD  III to IV 2.5   Past Medical History:    Past Medical History:   Diagnosis Date    CAD (coronary artery disease)     CAD (coronary artery disease) 10/20/2016    Distal RCA 3.0 x 15 mm [place] CRISTEL    Essential hypertension      Past Surgical History  Past Surgical History:   Procedure Laterality Date    ANGIOPLASTY      GS    APPENDECTOMY      BREAST BIOPSY       SECTION      CHOLECYSTECTOMY      CT BIOPSY RENAL  2021    CT BIOPSY RENAL 2021 [address] SCAN    HERNIA REPAIR       Social History:       Social History     Tobacco Use   Smoking Status Never Smoker   Smokeless Tobacco Never Used     Current Medications:  Prior to Visit Medications    Medication Sig Taking?  Authorizing Provider   Evolocumab (REPATHA SURECLICK) 610 MG/ML SOAJ INJECT CONTENTS OF 1 PEN SUBCUTANEOUSLY EVERY 14 DAYS Yes Rosalyn Dubin, APRN - CNP   BRILINTA 60 MG TABS tablet TAKE 1 TABLET TWICE A DAY Yes ALETA Joaquin - CNP   aspirin 81 MG EC tablet Take 81 mg by mouth daily Yes Historical Provider, MD   glipiZIDE (GLUCOTROL XL) 2.5 MG extended release tablet Take 2.5 mg by mouth 2 times daily (with meals) Yes Historical Provider, MD   metoprolol tartrate (LOPRESSOR) 25 MG tablet Take 25 mg by mouth 2 times daily Yes Historical Provider, MD   hydrALAZINE (APRESOLINE) 50 MG tablet Take 50 mg by mouth 2 times daily Yes Historical Provider, MD   sertraline (ZOLOFT) 50 MG tablet Take 50 mg by mouth daily Yes Historical Provider, MD   vitamin D (CHOLECALCIFEROL) 25 MCG (1000 UT) TABS tablet Take 1,000 Units by mouth daily Yes Historical Provider, MD   coenzyme Q10 100 MG CAPS capsule Take 100 mg by mouth daily Yes Historical Provider, MD     Family History  No family history on file. Current Medications  Current Outpatient Medications   Medication Sig Dispense Refill    Evolocumab (REPATHA SURECLICK) 809 MG/ML SOAJ INJECT CONTENTS OF 1 PEN SUBCUTANEOUSLY EVERY 14 DAYS 2 pen 6    BRILINTA 60 MG TABS tablet TAKE 1 TABLET TWICE A  tablet 3    aspirin 81 MG EC tablet Take 81 mg by mouth daily      glipiZIDE (GLUCOTROL XL) 2.5 MG extended release tablet Take 2.5 mg by mouth 2 times daily (with meals)      metoprolol tartrate (LOPRESSOR) 25 MG tablet Take 25 mg by mouth 2 times daily      hydrALAZINE (APRESOLINE) 50 MG tablet Take 50 mg by mouth 2 times daily      sertraline (ZOLOFT) 50 MG tablet Take 50 mg by mouth daily      vitamin D (CHOLECALCIFEROL) 25 MCG (1000 UT) TABS tablet Take 1,000 Units by mouth daily      coenzyme Q10 100 MG CAPS capsule Take 100 mg by mouth daily       No current facility-administered medications for this visit. REVIEW OF SYSTEMS:    CONSTITUTIONAL: No major weight gain or loss, fatigue, weakness, night sweats or fever. HEENT: No new vision difficulties or ringing in the ears. RESPIRATORY: No new SOB, PND, orthopnea or cough. CARDIOVASCULAR: See HPI  GI: No nausea, vomiting, diarrhea, constipation, abdominal pain or changes in bowel habits. : No urinary frequency, urgency, incontinence hematuria or dysuria. SKIN: No cyanosis or skin lesions. MUSCULOSKELETAL: No new muscle or joint pain. NEUROLOGICAL: No syncope or TIA-like symptoms. PSYCHIATRIC: No anxiety, pain, insomnia or depression    Objective:   PHYSICAL EXAM:        VITALS:    Wt Readings from Last 3 Encounters:   08/16/21 222 lb (100.7 kg)   12/15/20 212 lb (96.2 kg)   11/21/20 229 lb 9.6 oz (104.1 kg)     BP Readings from Last 3 Encounters:   06/17/21 (!) 154/73   12/15/20 128/80   11/24/20 (!) 158/70     Pulse Readings from Last 3 Encounters:   06/17/21 66   12/15/20 68   11/24/20 68       CONSTITUTIONAL: Cooperative, no apparent distress, and appears well nourished / developed  NEUROLOGIC:  Awake and orientated to person, place and time. PSYCH: Calm affect. SKIN: Warm and dry. HEENT: Sclera non-icteric, normocephalic, neck supple, no elevation of JVP, normal carotid pulses with no bruits and thyroid normal size. LUNGS:  No increased work of breathing and clear to auscultation, no crackles or wheezing  CARDIOVASCULAR:  Regular rate and rhythm with no murmurs, gallops, rubs, or abnormal heart sounds, normal PMI. The apical impulses not displaced  Heart tones are crisp and normal  Cervical veins are not engorged  The carotid upstroke is normal in amplitude and contour without delay or bruit  JVP is not elevated  ABDOMEN:  Normal bowel sounds, non-distended and non-tender to palpation  EXT: No edema, no calf tenderness. Pulses are present bilaterally.     DATA:    Lab Results   Component Value Date    ALT 34 07/17/2019    AST 21 07/17/2019    ALKPHOS 80 07/17/2019    BILITOT <0.2 07/17/2019     Lab Results   Component Value Date    CREATININE 2.5 (H) 11/24/2020    BUN 32 (H) 11/24/2020     11/24/2020    K 3.6 11/24/2020     11/24/2020    CO2 22 11/24/2020 No results found for: TSH, X9ZEUPB, U6FECBX, THYROIDAB  Lab Results   Component Value Date    WBC 8.8 11/24/2020    HGB 10.0 (L) 11/24/2020    HCT 30.4 (L) 11/24/2020    MCV 95.6 11/24/2020     06/17/2021     No components found for: CHLPL  Lab Results   Component Value Date    TRIG 192 (H) 02/14/2020    TRIG 102 07/17/2019    TRIG 138 01/16/2019     Lab Results   Component Value Date    HDL 41 02/14/2020    HDL 65 (H) 07/17/2019    HDL 40 01/16/2019     Lab Results   Component Value Date    LDLCALC 73 02/14/2020    LDLCALC 69 07/17/2019    LDLCALC 86 01/16/2019     Lab Results   Component Value Date    LABVLDL 38 02/14/2020    LABVLDL 20 07/17/2019    LABVLDL 28 01/16/2019     Radiology Review:  Pertinent images / reports were reviewed as a part of this visit and reveals the following:    Last Echo: We will get echocardiogram.    Last Stress Test / Angiogram: See cath reports    Last ECG: On 11/18/2020 sinus rhythm. Possible old septal infarct. 81/min. This patient was educated using the patient point room wall mount device. Absence from smokers and smoking and diet and exercising are important. Assessment:   CAD and post PTCA stents. Hyperlipidemia on Repatha    Plan:   Fasting lipid and CMP. Consider reducing her Repatha to a half dose depending on her current numbers. Return to see me in 6 months. S.    Please call if we can assist further [phone]. Julita Vick.  Ivett TY, [facility]      This note was likely completed using voice recognition technology and may contain unintended errors

## 2021-10-14 ENCOUNTER — TELEPHONE (OUTPATIENT)
Dept: CARDIOLOGY CLINIC | Age: 71
End: 2021-10-14

## 2021-10-14 NOTE — TELEPHONE ENCOUNTER
Pharmacy called for refill for patient.  Patient would like 90-day supply.      Evolocumab (REPATHA SURECLICK) 926 MG/ML SOAJ  INJECT CONTENTS OF 1 PEN SUBCUTANEOUSLY EVERY 14 DAYS, Disp-2 pen, R-6  Normal Last Dose: Not Recorded    Last visit: 8/16/21  Next visit: 2/17/22

## 2021-11-08 ENCOUNTER — APPOINTMENT (OUTPATIENT)
Dept: GENERAL RADIOLOGY | Age: 71
End: 2021-11-08
Payer: MEDICARE

## 2021-11-08 ENCOUNTER — HOSPITAL ENCOUNTER (EMERGENCY)
Age: 71
Discharge: HOME | End: 2021-11-08
Attending: STUDENT IN AN ORGANIZED HEALTH CARE EDUCATION/TRAINING PROGRAM
Payer: MEDICARE

## 2021-11-08 ENCOUNTER — APPOINTMENT (OUTPATIENT)
Dept: CT IMAGING | Age: 71
End: 2021-11-08
Payer: MEDICARE

## 2021-11-08 VITALS
SYSTOLIC BLOOD PRESSURE: 167 MMHG | RESPIRATION RATE: 16 BRPM | WEIGHT: 222 LBS | DIASTOLIC BLOOD PRESSURE: 77 MMHG | HEART RATE: 57 BPM | OXYGEN SATURATION: 100 % | HEIGHT: 60 IN | BODY MASS INDEX: 43.59 KG/M2 | TEMPERATURE: 98 F
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
## 2021-11-08 PROCEDURE — 6370000000 HC RX 637 (ALT 250 FOR IP): Performed by: PHYSICIAN ASSISTANT

## 2021-11-08 PROCEDURE — 99283 EMERGENCY DEPT VISIT LOW MDM: CPT

## 2021-11-08 PROCEDURE — 73030 X-RAY EXAM OF SHOULDER: CPT

## 2021-11-08 PROCEDURE — 72125 CT NECK SPINE W/O DYE: CPT

## 2021-11-08 PROCEDURE — 70450 CT HEAD/BRAIN W/O DYE: CPT

## 2021-11-08 PROCEDURE — 72070 X-RAY EXAM THORAC SPINE 2VWS: CPT

## 2021-11-08 PROCEDURE — 71045 X-RAY EXAM CHEST 1 VIEW: CPT

## 2021-11-08 RX ORDER — METHOCARBAMOL 500 MG/1
500 TABLET, FILM COATED ORAL 3 TIMES DAILY
Qty: 12 TABLET | Refills: 0 | Status: SHIPPED | OUTPATIENT
Start: 2021-11-08 | End: 2021-11-12

## 2021-11-08 RX ORDER — ACETAMINOPHEN 500 MG
1000 TABLET ORAL ONCE
Status: COMPLETED | OUTPATIENT
Start: 2021-11-08 | End: 2021-11-08

## 2021-11-08 RX ADMIN — ACETAMINOPHEN 1000 MG: 500 TABLET, FILM COATED ORAL at 15:27

## 2021-11-08 ASSESSMENT — PAIN SCALES - GENERAL
PAINLEVEL_OUTOF10: 5
PAINLEVEL_OUTOF10: 5

## 2021-11-08 ASSESSMENT — PAIN DESCRIPTION - PAIN TYPE: TYPE: ACUTE PAIN

## 2021-11-08 ASSESSMENT — PAIN DESCRIPTION - ORIENTATION: ORIENTATION: RIGHT

## 2021-11-08 ASSESSMENT — ENCOUNTER SYMPTOMS
BACK PAIN: 0
DIARRHEA: 0
NAUSEA: 0
ABDOMINAL PAIN: 0
EYE PAIN: 0
PHOTOPHOBIA: 0
COUGH: 0
SHORTNESS OF BREATH: 0
VOMITING: 0

## 2021-11-08 ASSESSMENT — PAIN DESCRIPTION - LOCATION: LOCATION: BACK;NECK;ARM

## 2021-11-08 ASSESSMENT — PAIN DESCRIPTION - FREQUENCY: FREQUENCY: CONTINUOUS
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
## 2022-01-31 ENCOUNTER — TELEPHONE (OUTPATIENT)
Dept: CARDIOLOGY CLINIC | Age: 72
End: 2022-01-31

## 2022-01-31 NOTE — TELEPHONE ENCOUNTER
Last appt 8-16-21  Next appt 2-17-22    Patient is out of medication    Patient would like samples of brilinta 60 mg. Pt phone # [phone]    Patient also is asking for a prior auth for a reduction on price for brilinta to [facility] #  [phone]     BRILINTA 60 MG TABS tablet  TAKE 1 TABLET TWICE A DAY, Disp-180 tablet, R-3  YOUR PATIENT HAS REQUESTED A REFILL OF THIS MEDICATION, PREVIOUSLY AUTHORIZED BY ANOTHER PRESCRIBER.   Normal Last Dose: Not Recorded  Refills:3 ordered     Pharmacy:[facility] [address] [place], [place] [address]Alma Rosa Blackman 53

## 2022-02-04 ENCOUNTER — HOSPITAL ENCOUNTER (EMERGENCY)
Age: 72
Discharge: HOME | End: 2022-02-04
Attending: EMERGENCY MEDICINE
Payer: MEDICARE

## 2022-02-04 VITALS
SYSTOLIC BLOOD PRESSURE: 189 MMHG | HEART RATE: 76 BPM | WEIGHT: 234 LBS | RESPIRATION RATE: 16 BRPM | OXYGEN SATURATION: 99 % | DIASTOLIC BLOOD PRESSURE: 64 MMHG | TEMPERATURE: 98 F | HEIGHT: 60 IN | BODY MASS INDEX: 45.94 KG/M2

## 2022-02-04 DIAGNOSIS — R10.9 FLANK PAIN: Primary | ICD-10-CM

## 2022-02-04 DIAGNOSIS — N30.00 ACUTE CYSTITIS WITHOUT HEMATURIA: ICD-10-CM

## 2022-02-04 LAB
ALBUMIN SERPL-MCNC: 3.9 G/DL (ref 3.4–5)
ALP BLD-CCNC: 73 U/L (ref 40–129)
ALT SERPL-CCNC: 14 U/L (ref 10–40)
ANION GAP SERPL CALCULATED.3IONS-SCNC: 12 MMOL/L (ref 3–16)
AST SERPL-CCNC: 21 U/L (ref 15–37)
BACTERIA: ABNORMAL /HPF
BASOPHILS ABSOLUTE: 0 K/UL (ref 0–0.2)
BASOPHILS RELATIVE PERCENT: 0.3 %
BILIRUB SERPL-MCNC: 0.3 MG/DL (ref 0–1)
BILIRUBIN DIRECT: <0.2 MG/DL (ref 0–0.3)
BILIRUBIN URINE: NEGATIVE
BILIRUBIN, INDIRECT: NORMAL MG/DL (ref 0–1)
BLOOD, URINE: NEGATIVE
BUN BLDV-MCNC: 50 MG/DL (ref 7–20)
CALCIUM SERPL-MCNC: 10.2 MG/DL (ref 8.3–10.6)
CHLORIDE BLD-SCNC: 102 MMOL/L (ref 99–110)
CLARITY: CLEAR
CO2: 22 MMOL/L (ref 21–32)
COLOR: YELLOW
CREAT SERPL-MCNC: 2.9 MG/DL (ref 0.6–1.2)
EOSINOPHILS ABSOLUTE: 0.2 K/UL (ref 0–0.6)
EOSINOPHILS RELATIVE PERCENT: 1.9 %
EPITHELIAL CELLS, UA: ABNORMAL /HPF (ref 0–5)
GFR AFRICAN AMERICAN: 19
GFR NON-AFRICAN AMERICAN: 16
GLUCOSE BLD-MCNC: 81 MG/DL (ref 70–99)
GLUCOSE URINE: NEGATIVE MG/DL
HCT VFR BLD CALC: 33.8 % (ref 36–48)
HEMOGLOBIN: 10.9 G/DL (ref 12–16)
KETONES, URINE: NEGATIVE MG/DL
LEUKOCYTE ESTERASE, URINE: NEGATIVE
LIPASE: 46 U/L (ref 13–60)
LYMPHOCYTES ABSOLUTE: 1.6 K/UL (ref 1–5.1)
LYMPHOCYTES RELATIVE PERCENT: 19.1 %
MCH RBC QN AUTO: 30.8 PG (ref 26–34)
MCHC RBC AUTO-ENTMCNC: 32.2 G/DL (ref 31–36)
MCV RBC AUTO: 95.7 FL (ref 80–100)
MICROSCOPIC EXAMINATION: YES
MONOCYTES ABSOLUTE: 0.9 K/UL (ref 0–1.3)
MONOCYTES RELATIVE PERCENT: 11 %
NEUTROPHILS ABSOLUTE: 5.7 K/UL (ref 1.7–7.7)
NEUTROPHILS RELATIVE PERCENT: 67.7 %
NITRITE, URINE: POSITIVE
PDW BLD-RTO: 14.1 % (ref 12.4–15.4)
PH UA: 6 (ref 5–8)
PLATELET # BLD: 317 K/UL (ref 135–450)
PMV BLD AUTO: 7.8 FL (ref 5–10.5)
POTASSIUM SERPL-SCNC: 4.8 MMOL/L (ref 3.5–5.1)
PROTEIN UA: 100 MG/DL
RBC # BLD: 3.53 M/UL (ref 4–5.2)
RBC UA: ABNORMAL /HPF (ref 0–4)
SODIUM BLD-SCNC: 136 MMOL/L (ref 136–145)
SPECIFIC GRAVITY UA: 1.02 (ref 1–1.03)
TOTAL PROTEIN: 8.2 G/DL (ref 6.4–8.2)
URINE TYPE: ABNORMAL
UROBILINOGEN, URINE: 0.2 E.U./DL
WBC # BLD: 8.5 K/UL (ref 4–11)
WBC UA: ABNORMAL /HPF (ref 0–5)

## 2022-02-04 PROCEDURE — 83690 ASSAY OF LIPASE: CPT

## 2022-02-04 PROCEDURE — 81001 URINALYSIS AUTO W/SCOPE: CPT

## 2022-02-04 PROCEDURE — 85025 COMPLETE CBC W/AUTO DIFF WBC: CPT

## 2022-02-04 PROCEDURE — 6360000002 HC RX W HCPCS: Performed by: NURSE PRACTITIONER

## 2022-02-04 PROCEDURE — 87086 URINE CULTURE/COLONY COUNT: CPT

## 2022-02-04 PROCEDURE — 96374 THER/PROPH/DIAG INJ IV PUSH: CPT

## 2022-02-04 PROCEDURE — 80048 BASIC METABOLIC PNL TOTAL CA: CPT

## 2022-02-04 PROCEDURE — 80076 HEPATIC FUNCTION PANEL: CPT

## 2022-02-04 PROCEDURE — 96375 TX/PRO/DX INJ NEW DRUG ADDON: CPT

## 2022-02-04 PROCEDURE — 99284 EMERGENCY DEPT VISIT MOD MDM: CPT

## 2022-02-04 PROCEDURE — 6370000000 HC RX 637 (ALT 250 FOR IP): Performed by: NURSE PRACTITIONER

## 2022-02-04 RX ORDER — ONDANSETRON 2 MG/ML
4 INJECTION INTRAMUSCULAR; INTRAVENOUS ONCE
Status: COMPLETED | OUTPATIENT
Start: 2022-02-04 | End: 2022-02-04

## 2022-02-04 RX ORDER — SODIUM BICARBONATE 650 MG/1
TABLET ORAL
COMMUNITY
Start: 2022-01-18

## 2022-02-04 RX ORDER — ALLOPURINOL 100 MG/1
TABLET ORAL
COMMUNITY
Start: 2022-01-19

## 2022-02-04 RX ORDER — MORPHINE SULFATE 4 MG/ML
4 INJECTION, SOLUTION INTRAMUSCULAR; INTRAVENOUS ONCE
Status: COMPLETED | OUTPATIENT
Start: 2022-02-04 | End: 2022-02-04

## 2022-02-04 RX ORDER — CYCLOBENZAPRINE HCL 10 MG
10 TABLET ORAL 3 TIMES DAILY PRN
Qty: 15 TABLET | Refills: 0 | Status: SHIPPED | OUTPATIENT
Start: 2022-02-04

## 2022-02-04 RX ORDER — CEPHALEXIN 500 MG/1
500 CAPSULE ORAL ONCE
Status: COMPLETED | OUTPATIENT
Start: 2022-02-04 | End: 2022-02-04

## 2022-02-04 RX ORDER — CEPHALEXIN 500 MG/1
500 CAPSULE ORAL 2 TIMES DAILY
Qty: 20 CAPSULE | Refills: 0 | Status: SHIPPED | OUTPATIENT
Start: 2022-02-04 | End: 2022-02-14

## 2022-02-04 RX ORDER — LIDOCAINE 50 MG/G
1 PATCH TOPICAL DAILY
Qty: 30 PATCH | Refills: 0 | Status: SHIPPED | OUTPATIENT
Start: 2022-02-04

## 2022-02-04 RX ORDER — LIDOCAINE 4 G/G
1 PATCH TOPICAL DAILY
Status: DISCONTINUED | OUTPATIENT
Start: 2022-02-04 | End: 2022-02-04 | Stop reason: HOSPADM

## 2022-02-04 RX ADMIN — MORPHINE SULFATE 4 MG: 4 INJECTION INTRAVENOUS at 11:02

## 2022-02-04 RX ADMIN — ONDANSETRON 4 MG: 2 INJECTION INTRAMUSCULAR; INTRAVENOUS at 11:01

## 2022-02-04 RX ADMIN — CEPHALEXIN 500 MG: 500 CAPSULE ORAL at 12:03

## 2022-02-04 ASSESSMENT — ENCOUNTER SYMPTOMS
RESPIRATORY NEGATIVE: 1
NAUSEA: 0
DIARRHEA: 0
ABDOMINAL PAIN: 0
VOMITING: 0

## 2022-02-04 ASSESSMENT — PAIN SCALES - GENERAL
PAINLEVEL_OUTOF10: 7
PAINLEVEL_OUTOF10: 3
PAINLEVEL_OUTOF10: 7

## 2022-02-04 ASSESSMENT — PAIN DESCRIPTION - ORIENTATION
ORIENTATION: RIGHT
ORIENTATION: RIGHT

## 2022-02-04 ASSESSMENT — PAIN DESCRIPTION - LOCATION
LOCATION: FLANK
LOCATION: FLANK

## 2022-02-04 ASSESSMENT — PAIN DESCRIPTION - PAIN TYPE: TYPE: ACUTE PAIN

## 2022-02-04 NOTE — ED PROVIDER NOTES
[address] ENCOUNTER          NURSE PRACTITIONER NOTE       Date of evaluation: 2/4/2022    Chief Complaint     Flank Pain (Onset 2 days ago with right flank pain. States has had this pain in the past but not this bad. Pain increases with movement)      History of Present Illness     Yann Hernandez is a 70 y.o. female with a past medical history of CAD, essential hypertension, diabetes, hyperlipidemia, CKD who presents to the emergency department with a complaint of right-sided back pain/flank pain. Patient states she has been experiencing this pain off and on for \"quite some time\" however over the past 2 to 3 days it has been more constant in nature. It is exacerbated with the smallest movement such as crossing her legs, or any attempts at position change. She has been taking Tylenol without relief of the pain. She denies injury or trauma. Also shares that she is had intermittent \"weakness\" of her arms and legs which she attributed to possibly needing to be placed back on her magnesium supplementation which she was taken off of several months ago. States she is not due for repeat labs with her PCP until next month. She denies any unilateral weakness, numbness or tingling. Denies headaches, vision changes, recent illnesses. Denies urinary changes, or history of kidney stones, denies waxing or waning of the right-sided back pain or any radiation into her abdomen. Denies nausea vomiting. Has been told in the past that it could have been due to how she slept, however due to the severe nature of the pain for the past several days felt that she should be evaluated. Review of Systems     Review of Systems   Constitutional: Positive for fatigue. Respiratory: Negative. Cardiovascular: Negative. Gastrointestinal: Negative for abdominal pain, diarrhea, nausea and vomiting. Genitourinary: Positive for flank pain.  Negative for dysuria, frequency, hematuria, pelvic pain, urgency, vaginal discharge and vaginal pain. Skin: Negative. Allergic/Immunologic: Negative for immunocompromised state. Neurological: Negative for dizziness, weakness (no unilateral) and numbness. Hematological: Does not bruise/bleed easily. Psychiatric/Behavioral: Negative. Past Medical, Surgical, Family, and Social History     She has a past medical history of CAD (coronary artery disease), CAD (coronary artery disease), and Essential hypertension. She has a past surgical history that includes angioplasty (); Cholecystectomy; Appendectomy;  section; Breast biopsy; hernia repair; and CT BIOPSY RENAL (2021). Her family history is not on file. She reports that she has never smoked. She has never used smokeless tobacco. She reports that she does not drink alcohol and does not use drugs. Medications     Previous Medications    ALLOPURINOL (ZYLOPRIM) 100 MG TABLET        ASPIRIN 81 MG EC TABLET    Take 81 mg by mouth daily    EVOLOCUMAB (REPATHA SURECLICK) 253 MG/ML SOAJ    INJECT CONTENTS OF 1 PEN SUBCUTANEOUSLY EVERY 14 DAYS    GLIPIZIDE (GLUCOTROL XL) 2.5 MG EXTENDED RELEASE TABLET    Take 2.5 mg by mouth 2 times daily (with meals)    HYDRALAZINE (APRESOLINE) 50 MG TABLET    Take 50 mg by mouth 2 times daily    METOPROLOL TARTRATE (LOPRESSOR) 25 MG TABLET    Take 25 mg by mouth 2 times daily    SERTRALINE (ZOLOFT) 50 MG TABLET    Take 50 mg by mouth daily    SODIUM BICARBONATE 650 MG TABLET    TAKE 1 TABLET (650 MG TOTAL) BY MOUTH 3 TIMES DAILY. SODIUM ZIRCONIUM CYCLOSILICATE (LOKELMA) 10 G PACK ORAL SUSPENSION    Take 10 g by mouth Every Monday, Wednesday, and Friday    TICAGRELOR (BRILINTA) 60 MG TABS TABLET    TAKE 1 TABLET TWICE A DAY    VITAMIN D (CHOLECALCIFEROL) 25 MCG (1000 UT) TABS TABLET    Take 1,000 Units by mouth daily       Allergies     She is allergic to crestor [rosuvastatin calcium] and statins.     Physical Exam     INITIAL VITALS: BP: (!) 206/80, Temp: 98 °F (36.7 °C), Pulse: 64, Resp: 20, SpO2: 96 % Physical Exam  Vitals and nursing note reviewed. Constitutional:       Appearance: Normal appearance. She is obese. HENT:      Head: Normocephalic and atraumatic. Cardiovascular:      Rate and Rhythm: Normal rate and regular rhythm. Pulses: Normal pulses. Heart sounds: Normal heart sounds. No murmur heard. No friction rub. No gallop. Pulmonary:      Effort: Pulmonary effort is normal. No respiratory distress. Breath sounds: Normal breath sounds. Musculoskeletal:         General: Tenderness (TTP of the right CVA/thoracic paraspinal musculature without skin changes, no midline TTP) present. Normal range of motion. Cervical back: Normal range of motion and neck supple. Comments: 4/5 strength in BLE and BUE. Skin:     General: Skin is warm and dry. Capillary Refill: Capillary refill takes less than 2 seconds. Neurological:      Mental Status: She is alert and oriented to person, place, and time.    Psychiatric:         Mood and Affect: Mood normal.         Behavior: Behavior normal.           Diagnostic Results       RADIOLOGY:  No orders to display       LABS:   Results for orders placed or performed during the hospital encounter of 02/04/22   Urinalysis, reflex to microscopic (Lab)   Result Value Ref Range    Color, UA Yellow Straw/Yellow    Clarity, UA Clear Clear    Glucose, Ur Negative Negative mg/dL    Bilirubin Urine Negative Negative    Ketones, Urine Negative Negative mg/dL    Specific Gravity, UA 1.020 1.005 - 1.030    Blood, Urine Negative Negative    pH, UA 6.0 5.0 - 8.0    Protein,  (A) Negative mg/dL    Urobilinogen, Urine 0.2 <2.0 E.U./dL    Nitrite, Urine POSITIVE (A) Negative    Leukocyte Esterase, Urine Negative Negative    Microscopic Examination YES     Urine Type NotGiven    CBC auto differential   Result Value Ref Range    WBC 8.5 4.0 - 11.0 K/uL    RBC 3.53 (L) 4.00 - 5.20 M/uL    Hemoglobin 10.9 (L) 12.0 - 16.0 g/dL    Hematocrit 33.8 (L) 36.0 - 48.0 %    MCV 95.7 80.0 - 100.0 fL    MCH 30.8 26.0 - 34.0 pg    MCHC 32.2 31.0 - 36.0 g/dL    RDW 14.1 12.4 - 15.4 %    Platelets 405 399 - 522 K/uL    MPV 7.8 5.0 - 10.5 fL    Neutrophils % 67.7 %    Lymphocytes % 19.1 %    Monocytes % 11.0 %    Eosinophils % 1.9 %    Basophils % 0.3 %    Neutrophils Absolute 5.7 1.7 - 7.7 K/uL    Lymphocytes Absolute 1.6 1.0 - 5.1 K/uL    Monocytes Absolute 0.9 0.0 - 1.3 K/uL    Eosinophils Absolute 0.2 0.0 - 0.6 K/uL    Basophils Absolute 0.0 0.0 - 0.2 K/uL   Basic Metabolic Panel   Result Value Ref Range    Sodium 136 136 - 145 mmol/L    Potassium 4.8 3.5 - 5.1 mmol/L    Chloride 102 99 - 110 mmol/L    CO2 22 21 - 32 mmol/L    Anion Gap 12 3 - 16    Glucose 81 70 - 99 mg/dL    BUN 50 (H) 7 - 20 mg/dL    CREATININE 2.9 (H) 0.6 - 1.2 mg/dL    GFR Non- 16 (A) >60    GFR  19 (A) >60    Calcium 10.2 8.3 - 10.6 mg/dL   Hepatic function panel (LFTs)   Result Value Ref Range    Total Protein 8.2 6.4 - 8.2 g/dL    Albumin 3.9 3.4 - 5.0 g/dL    Alkaline Phosphatase 73 40 - 129 U/L    ALT 14 10 - 40 U/L    AST 21 15 - 37 U/L    Total Bilirubin 0.3 0.0 - 1.0 mg/dL    Bilirubin, Direct <0.2 0.0 - 0.3 mg/dL    Bilirubin, Indirect see below 0.0 - 1.0 mg/dL   Lipase   Result Value Ref Range    Lipase 46.0 13.0 - 60.0 U/L   Microscopic Urinalysis   Result Value Ref Range    WBC, UA 10-20 (A) 0 - 5 /HPF    RBC, UA 0-2 0 - 4 /HPF    Epithelial Cells, UA 2-5 0 - 5 /HPF    Bacteria, UA 3+ (A) None Seen /HPF       RECENT VITALS:  BP: (!) 189/64, Temp: 98 °F (36.7 °C), Pulse: 76, Resp: 16, SpO2: 99 %       ED Course     Nursing Notes, Past Medical Hx, Past Surgical Hx, Social Hx, Allergies, and Family Hx were reviewed.     The patient was given the following medications:  Orders Placed This Encounter   Medications    morphine injection 4 mg    ondansetron (ZOFRAN) injection 4 mg    lidocaine 4 % external patch 1 patch    cephALEXin (KEFLEX) capsule 500 mg     Order Specific Question:   Antimicrobial Indications     Answer:   Urinary Tract Infection    cephALEXin (KEFLEX) 500 MG capsule     Sig: Take 1 capsule by mouth 2 times daily for 10 days     Dispense:  20 capsule     Refill:  0    lidocaine (LIDODERM) 5 %     Sig: Place 1 patch onto the skin daily 12 hours on, 12 hours off. Dispense:  30 patch     Refill:  0    cyclobenzaprine (FLEXERIL) 10 MG tablet     Sig: Take 1 tablet by mouth 3 times daily as needed for Muscle spasms     Dispense:  15 tablet     Refill:  0            CONSULTS:  None    MEDICAL DECISION MAKING / ASSESSMENT / Fallon Marimar is a 70 y.o. female who presents with complaints as noted in HPI. Patient presents to the emergency department with a complaint of right-sided flank pain/back pain. Has had this pain intermittently over the past several months, however feels like it is progressively worsening and becoming more constant over the past 3 days. Patient describes worsening with position change. No injury or trauma. She does appear very uncomfortable with range of motion exercises, and with simple sitting up in bed. She has focal tenderness to the right CVA, no midline tenderness. No skin change noted. She is hypertensive, otherwise hemodynamically stable. She had an IV placed appropriate lab was drawn including a CBC, BMP, LFTs, lipase, urinalysis to evaluate for possible referred intra-abdominal pathology; laboratory results overall reassuring other than urinalysis which does show evidence of infection with 10-20 WBCs, positive nitrates. Urine culture was sent, the patient will be placed on Keflex x10 days for treatment. At this time I do not think the patient has pyelonephritis that she does not have leukocytosis, and no systemic symptoms, however will treat with a prolonged antibiotic course.   Patient without history of kidney stones, no hematuria noted in urinalysis, denies waxing or waning pain. Patient was given morphine and Zofran for pain as well as a Lidoderm patch, with good control here. Hypertension improved after pain control. Patient will be discharged home with instructions on warm compresses, prescriptions for Flexeril, Lidoderm patches, as well as instructions on Tylenol use to treat musculoskeletal back pain, as well as prescription for a 10 day course of keflex. To follow-up with her primary care provider in the next 1 to 2 weeks, also given strict return precautions which she verbalized understanding of. Patient was given strict return precautions as outlined in the AVS. Patient was agreeable and understanding to this plan of care. This patient was also evaluated by the attending physician. All care plans were discussed and agreed upon. Clinical Impression     1. Flank pain    2. Acute cystitis without hematuria        Disposition     PATIENT REFERRED TO:  Teodora Blackwell MD  [facility] [address] [facility]  [phone]      in 1-2 weeks      DISCHARGE MEDICATIONS:  New Prescriptions    CEPHALEXIN (KEFLEX) 500 MG CAPSULE    Take 1 capsule by mouth 2 times daily for 10 days    CYCLOBENZAPRINE (FLEXERIL) 10 MG TABLET    Take 1 tablet by mouth 3 times daily as needed for Muscle spasms    LIDOCAINE (LIDODERM) 5 %    Place 1 patch onto the skin daily 12 hours on, 12 hours off.        DISPOSITION  Home in stable condition        ALETA Altman - PEDRO  02/04/22 [id number]

## 2022-02-04 NOTE — ED PROVIDER NOTES
ED Attending Attestation Note     Date of evaluation: 2/4/2022    This patient was seen by the advance practice provider. I have seen and examined the patient, agree with the workup, evaluation, management and diagnosis. The care plan has been discussed. My assessment reveals a 57-year-old female presenting to the emergency department the commode of right-sided flank pain. On examination patient has tenderness palpation of the paraspinal musculature in the lumbar region on the right negative straight leg raise bilaterally.      Raquel Mcnamara MD  02/04/22 1028

## 2022-02-04 NOTE — ED TRIAGE NOTES
Onset 2 days ago with right flank pain. States has had this pain in the past but not this bad.  Pain increases with movement

## 2022-02-05 LAB — URINE CULTURE, ROUTINE: NORMAL

## 2022-02-17 ENCOUNTER — OFFICE VISIT (OUTPATIENT)
Dept: CARDIOLOGY CLINIC | Age: 72
End: 2022-02-17
Payer: MEDICARE

## 2022-02-17 VITALS
WEIGHT: 223 LBS | SYSTOLIC BLOOD PRESSURE: 122 MMHG | HEART RATE: 72 BPM | DIASTOLIC BLOOD PRESSURE: 70 MMHG | BODY MASS INDEX: 43.78 KG/M2 | HEIGHT: 60 IN

## 2022-02-17 DIAGNOSIS — E78.00 PURE HYPERCHOLESTEROLEMIA: ICD-10-CM

## 2022-02-17 DIAGNOSIS — R07.9 CHEST PAIN, UNSPECIFIED TYPE: Primary | ICD-10-CM

## 2022-02-17 DIAGNOSIS — R07.9 CHEST PAIN, UNSPECIFIED TYPE: ICD-10-CM

## 2022-02-17 LAB
A/G RATIO: 1.2 (ref 1.1–2.2)
ALBUMIN SERPL-MCNC: 4.4 G/DL (ref 3.4–5)
ALP BLD-CCNC: 79 U/L (ref 40–129)
ALT SERPL-CCNC: 20 U/L (ref 10–40)
ANION GAP SERPL CALCULATED.3IONS-SCNC: 16 MMOL/L (ref 3–16)
AST SERPL-CCNC: 25 U/L (ref 15–37)
BILIRUB SERPL-MCNC: 0.3 MG/DL (ref 0–1)
BUN BLDV-MCNC: 40 MG/DL (ref 7–20)
CALCIUM SERPL-MCNC: 10.2 MG/DL (ref 8.3–10.6)
CHLORIDE BLD-SCNC: 100 MMOL/L (ref 99–110)
CHOLESTEROL, TOTAL: 202 MG/DL (ref 0–199)
CO2: 23 MMOL/L (ref 21–32)
CREAT SERPL-MCNC: 3.1 MG/DL (ref 0.6–1.2)
GFR AFRICAN AMERICAN: 18
GFR NON-AFRICAN AMERICAN: 15
GLUCOSE BLD-MCNC: 96 MG/DL (ref 70–99)
HDLC SERPL-MCNC: 37 MG/DL (ref 40–60)
LDL CHOLESTEROL CALCULATED: 120 MG/DL
POTASSIUM SERPL-SCNC: 4.9 MMOL/L (ref 3.5–5.1)
SODIUM BLD-SCNC: 139 MMOL/L (ref 136–145)
TOTAL PROTEIN: 8.1 G/DL (ref 6.4–8.2)
TRIGL SERPL-MCNC: 225 MG/DL (ref 0–150)
VLDLC SERPL CALC-MCNC: 45 MG/DL

## 2022-02-17 PROCEDURE — 99214 OFFICE O/P EST MOD 30 MIN: CPT | Performed by: INTERNAL MEDICINE

## 2022-02-17 PROCEDURE — 93000 ELECTROCARDIOGRAM COMPLETE: CPT | Performed by: INTERNAL MEDICINE

## 2022-02-17 NOTE — PROGRESS NOTES
[facility], [place]   Dr Ronny Floyd. Nita Schneider MD, [address] [facility]    Outpatient Follow Up Note    2022,9:59 AM  Subjective:   CHIEF COMPLAINT / HPI:  Follow Up secondary to hyperlipidemia and hypertension and coronary disease     Billy Yuan is 70 y.o. female who presents today here today for follow-up and evaluation and generally doing well. Continuing to do well and not having any significant issues regarding her site for Repatha injections. Her last LDL was 83 down from 198. She did have some hip pain which is unclear etiology. Her magnesium was discontinued with a level 2.1. Her weight is down by 11 pounds since earlier in the year. Her daughter Kirti Dias interestingly was in school [employer] [employer] with my son Emi Martinez vaccine x2 Jennifer Phillip and no issues     CAD with previous angioplasty stents.  and   Hyperlipidemia on Repatha  Intolerant statins lipitor and crestor and pravastatin  Covid 19+ 2020  CKD  III to IV 2.5 Dr Luis Alberto Luque   Past Medical History:                                                          Past Medical History:   Diagnosis Date    CAD (coronary artery disease)     CAD (coronary artery disease) 10/20/2016    Distal RCA 3.0 x 15 mm [place] CRISTEL    Essential hypertension      Past Surgical History  Past Surgical History:   Procedure Laterality Date    ANGIOPLASTY      GS    APPENDECTOMY      BREAST BIOPSY       SECTION      CHOLECYSTECTOMY      CT BIOPSY RENAL  2021    CT BIOPSY RENAL [facility]    HERNIA REPAIR       Social History:       Social History     Tobacco Use   Smoking Status Never Smoker   Smokeless Tobacco Never Used     Current Medications:  Prior to Visit Medications    Medication Sig Taking? Authorizing Provider   allopurinol (ZYLOPRIM) 100 MG tablet  Yes Historical Provider, MD   sodium bicarbonate 650 MG tablet TAKE 1 TABLET (650 MG TOTAL) BY MOUTH 3 TIMES DAILY.  Yes Historical Provider, MD   sodium zirconium cyclosilicate (LOKELMA) 10 g PACK oral suspension Take 10 g by mouth Every Monday, Wednesday, and Friday Yes Historical Provider, MD   lidocaine (LIDODERM) 5 % Place 1 patch onto the skin daily 12 hours on, 12 hours off. Yes ALETA Nunez CNP   cyclobenzaprine (FLEXERIL) 10 MG tablet Take 1 tablet by mouth 3 times daily as needed for Muscle spasms Yes ALETA Nunez CNP   ticagrelor (BRILINTA) 60 MG TABS tablet TAKE 1 TABLET TWICE A DAY Yes Deann Roy MD   Evolocumab (REPATHA SURECLICK) 757 MG/ML SOAJ INJECT CONTENTS OF 1 PEN SUBCUTANEOUSLY EVERY 14 DAYS Yes ALETA Robin CNP   aspirin 81 MG EC tablet Take 81 mg by mouth daily Yes Historical Provider, MD   glipiZIDE (GLUCOTROL XL) 2.5 MG extended release tablet Take 2.5 mg by mouth 2 times daily (with meals) Yes Historical Provider, MD   metoprolol tartrate (LOPRESSOR) 25 MG tablet Take 25 mg by mouth 2 times daily Yes Historical Provider, MD   hydrALAZINE (APRESOLINE) 50 MG tablet Take 50 mg by mouth 2 times daily Yes Historical Provider, MD   sertraline (ZOLOFT) 50 MG tablet Take 50 mg by mouth daily Yes Historical Provider, MD   vitamin D (CHOLECALCIFEROL) 25 MCG (1000 UT) TABS tablet Take 1,000 Units by mouth daily Yes Historical Provider, MD     Family History  No family history on file. Current Medications  Current Outpatient Medications   Medication Sig Dispense Refill    allopurinol (ZYLOPRIM) 100 MG tablet       sodium bicarbonate 650 MG tablet TAKE 1 TABLET (650 MG TOTAL) BY MOUTH 3 TIMES DAILY.  sodium zirconium cyclosilicate (LOKELMA) 10 g PACK oral suspension Take 10 g by mouth Every Monday, Wednesday, and Friday      lidocaine (LIDODERM) 5 % Place 1 patch onto the skin daily 12 hours on, 12 hours off.  30 patch 0    cyclobenzaprine (FLEXERIL) 10 MG tablet Take 1 tablet by mouth 3 times daily as needed for Muscle spasms 15 tablet 0    ticagrelor (BRILINTA) 60 MG TABS tablet TAKE 1 TABLET TWICE A  tablet 3    Evolocumab (REPATHA SURECLICK) 327 MG/ML SOAJ INJECT CONTENTS OF 1 PEN SUBCUTANEOUSLY EVERY 14 DAYS 2 pen 6    aspirin 81 MG EC tablet Take 81 mg by mouth daily      glipiZIDE (GLUCOTROL XL) 2.5 MG extended release tablet Take 2.5 mg by mouth 2 times daily (with meals)      metoprolol tartrate (LOPRESSOR) 25 MG tablet Take 25 mg by mouth 2 times daily      hydrALAZINE (APRESOLINE) 50 MG tablet Take 50 mg by mouth 2 times daily      sertraline (ZOLOFT) 50 MG tablet Take 50 mg by mouth daily      vitamin D (CHOLECALCIFEROL) 25 MCG (1000 UT) TABS tablet Take 1,000 Units by mouth daily       No current facility-administered medications for this visit. REVIEW OF SYSTEMS:    CONSTITUTIONAL: No major weight gain or loss, fatigue, weakness, night sweats or fever. HEENT: No new vision difficulties or ringing in the ears. RESPIRATORY: No new SOB, PND, orthopnea or cough. CARDIOVASCULAR: See HPI  GI: No nausea, vomiting, diarrhea, constipation, abdominal pain or changes in bowel habits. : No urinary frequency, urgency, incontinence hematuria or dysuria. SKIN: No cyanosis or skin lesions. MUSCULOSKELETAL: No new muscle or joint pain. NEUROLOGICAL: No syncope or TIA-like symptoms. PSYCHIATRIC: No anxiety, pain, insomnia or depression    Objective:   PHYSICAL EXAM:        VITALS:    Wt Readings from Last 3 Encounters:   02/17/22 223 lb (101.2 kg)   02/04/22 234 lb (106.1 kg)   11/08/21 222 lb (100.7 kg)     BP Readings from Last 3 Encounters:   02/17/22 122/70   02/04/22 (!) 189/64   11/08/21 (!) 167/77     Pulse Readings from Last 3 Encounters:   02/17/22 72   02/04/22 76   11/08/21 57       CONSTITUTIONAL: Cooperative, no apparent distress, and appears well nourished / developed  NEUROLOGIC:  Awake and orientated to person, place and time. PSYCH: Calm affect. SKIN: Warm and dry.   HEENT: Sclera non-icteric, normocephalic, neck supple, no elevation of JVP, normal carotid pulses with no bruits and thyroid normal size. LUNGS:  No increased work of breathing and clear to auscultation, no crackles or wheezing  CARDIOVASCULAR:  Regular rate and rhythm with no murmurs, gallops, rubs, or abnormal heart sounds, normal PMI. The apical impulses not displaced  Heart tones are crisp and normal  Cervical veins are not engorged  The carotid upstroke is normal in amplitude and contour without delay or bruit  JVP is not elevated  ABDOMEN:  Normal bowel sounds, non-distended and non-tender to palpation  EXT: No edema, no calf tenderness. Pulses are present bilaterally. DATA:    Lab Results   Component Value Date    ALT 14 02/04/2022    AST 21 02/04/2022    ALKPHOS 73 02/04/2022    BILITOT 0.3 02/04/2022     Lab Results   Component Value Date    CREATININE 2.9 (H) 02/04/2022    BUN 50 (H) 02/04/2022     02/04/2022    K 4.8 02/04/2022     02/04/2022    CO2 22 02/04/2022     No results found for: TSH, R5BWUDC, O1SFXWW, THYROIDAB  Lab Results   Component Value Date    WBC 8.5 02/04/2022    HGB 10.9 (L) 02/04/2022    HCT 33.8 (L) 02/04/2022    MCV 95.7 02/04/2022     02/04/2022     No components found for: CHLPL  Lab Results   Component Value Date    TRIG 166 (H) 08/16/2021    TRIG 192 (H) 02/14/2020    TRIG 102 07/17/2019     Lab Results   Component Value Date    HDL 38 (L) 08/16/2021    HDL 41 02/14/2020    HDL 65 (H) 07/17/2019     Lab Results   Component Value Date    LDLCALC 83 08/16/2021    LDLCALC 73 02/14/2020    LDLCALC 69 07/17/2019     Lab Results   Component Value Date    LABVLDL 33 08/16/2021    LABVLDL 38 02/14/2020    LABVLDL 20 07/17/2019     Radiology Review:  Pertinent images / reports were reviewed as a part of this visit and reveals the following:    Last Echo: We will get echocardiogram.    Last Stress Test / Angiogram: See cath reports    Last ECG: On 11/18/2020 sinus rhythm. Possible old septal infarct. 81/min.   EKG on 2/17/2022 sinus rhythm 72/min. Possible septal infarct. Pattern. This patient was educated using the patient point room wall mount device. Absence from smokers and smoking and diet and exercising are important. Assessment:   CAD and post PTCA stents. Hyperlipidemia on Repatha    Plan:   Continue fasting lipid and CMP today. Return to see me in 6 months. Please call if we can assist further [phone]. Juan C Muñoz.  Ivett TY, [facility] [place]      This note was likely completed using voice recognition technology and may contain unintended errors

## 2022-05-21 ENCOUNTER — APPOINTMENT (OUTPATIENT)
Dept: CT IMAGING | Age: 72
End: 2022-05-21
Payer: OTHER MISCELLANEOUS

## 2022-05-21 ENCOUNTER — HOSPITAL ENCOUNTER (EMERGENCY)
Age: 72
Discharge: HOME | End: 2022-05-21
Attending: STUDENT IN AN ORGANIZED HEALTH CARE EDUCATION/TRAINING PROGRAM
Payer: OTHER MISCELLANEOUS

## 2022-05-21 VITALS
RESPIRATION RATE: 18 BRPM | OXYGEN SATURATION: 98 % | SYSTOLIC BLOOD PRESSURE: 195 MMHG | HEART RATE: 70 BPM | TEMPERATURE: 98 F | DIASTOLIC BLOOD PRESSURE: 86 MMHG
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
## 2022-05-21 PROCEDURE — 99284 EMERGENCY DEPT VISIT MOD MDM: CPT

## 2022-05-21 PROCEDURE — 72131 CT LUMBAR SPINE W/O DYE: CPT

## 2022-05-21 PROCEDURE — 6370000000 HC RX 637 (ALT 250 FOR IP): Performed by: PHYSICIAN ASSISTANT

## 2022-05-21 PROCEDURE — 72125 CT NECK SPINE W/O DYE: CPT

## 2022-05-21 PROCEDURE — 72192 CT PELVIS W/O DYE: CPT

## 2022-05-21 RX ORDER — HYDRALAZINE HYDROCHLORIDE 50 MG/1
50 TABLET, FILM COATED ORAL ONCE
Status: COMPLETED | OUTPATIENT
Start: 2022-05-21 | End: 2022-05-21

## 2022-05-21 RX ORDER — METHOCARBAMOL 750 MG/1
750 TABLET, FILM COATED ORAL 3 TIMES DAILY
Qty: 12 TABLET | Refills: 0 | Status: SHIPPED | OUTPATIENT
Start: 2022-05-21 | End: 2022-05-25

## 2022-05-21 RX ADMIN — HYDRALAZINE HYDROCHLORIDE 50 MG: 50 TABLET, FILM COATED ORAL at 16:08

## 2022-05-21 ASSESSMENT — ENCOUNTER SYMPTOMS
PHOTOPHOBIA: 0
NAUSEA: 0
BACK PAIN: 1
DIARRHEA: 0
SHORTNESS OF BREATH: 0
ABDOMINAL PAIN: 0
EYE PAIN: 0
VOMITING: 0
COUGH: 0

## 2022-05-21 ASSESSMENT — PAIN DESCRIPTION - PAIN TYPE: TYPE: ACUTE PAIN

## 2022-05-21 ASSESSMENT — PAIN DESCRIPTION - DESCRIPTORS: DESCRIPTORS: ACHING;BURNING;TIGHTNESS

## 2022-05-21 ASSESSMENT — PAIN - FUNCTIONAL ASSESSMENT: PAIN_FUNCTIONAL_ASSESSMENT: 0-10

## 2022-05-21 ASSESSMENT — PAIN SCALES - GENERAL: PAINLEVEL_OUTOF10: 7

## 2022-05-21 ASSESSMENT — PAIN DESCRIPTION - ORIENTATION: ORIENTATION: RIGHT

## 2022-05-21 ASSESSMENT — PAIN DESCRIPTION - LOCATION: LOCATION: NECK;HIP;BACK
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
## 2022-05-22 NOTE — ED NOTES
Patient prepared for and ready to be discharged. Patient discharged at this time in no acute distress after verbalizing understanding of discharge instructions. Patient left after receiving After Visit Summary instructions.         Abby Chavis RN  05/21/22 2011

## 2022-06-06 ENCOUNTER — HOSPITAL ENCOUNTER (EMERGENCY)
Age: 72
Discharge: HOME | End: 2022-06-06
Attending: EMERGENCY MEDICINE
Payer: MEDICARE

## 2022-06-06 VITALS
BODY MASS INDEX: 43.59 KG/M2 | TEMPERATURE: 97.8 F | RESPIRATION RATE: 18 BRPM | DIASTOLIC BLOOD PRESSURE: 89 MMHG | SYSTOLIC BLOOD PRESSURE: 203 MMHG | HEART RATE: 91 BPM | WEIGHT: 222 LBS | HEIGHT: 60 IN | OXYGEN SATURATION: 98 %

## 2022-06-06 DIAGNOSIS — M10.072 ACUTE IDIOPATHIC GOUT INVOLVING TOE OF LEFT FOOT: Primary | ICD-10-CM

## 2022-06-06 LAB
GLUCOSE BLD-MCNC: 157 MG/DL (ref 70–99)
PERFORMED ON: ABNORMAL

## 2022-06-06 PROCEDURE — 99283 EMERGENCY DEPT VISIT LOW MDM: CPT

## 2022-06-06 PROCEDURE — 6370000000 HC RX 637 (ALT 250 FOR IP): Performed by: STUDENT IN AN ORGANIZED HEALTH CARE EDUCATION/TRAINING PROGRAM

## 2022-06-06 RX ORDER — PREDNISONE 20 MG/1
40 TABLET ORAL ONCE
Status: COMPLETED | OUTPATIENT
Start: 2022-06-06 | End: 2022-06-06

## 2022-06-06 RX ORDER — PREDNISONE 20 MG/1
TABLET ORAL
Qty: 9 TABLET | Refills: 0 | Status: SHIPPED | OUTPATIENT
Start: 2022-06-06 | End: 2022-06-13

## 2022-06-06 RX ADMIN — PREDNISONE 40 MG: 20 TABLET ORAL at 21:22

## 2022-06-06 ASSESSMENT — ENCOUNTER SYMPTOMS
ABDOMINAL DISTENTION: 0
SHORTNESS OF BREATH: 0
COUGH: 0
CONSTIPATION: 0
DIARRHEA: 0
NAUSEA: 0
VOMITING: 0
ABDOMINAL PAIN: 0

## 2022-06-06 ASSESSMENT — PAIN SCALES - GENERAL: PAINLEVEL_OUTOF10: 4

## 2022-06-07 NOTE — ED NOTES
Patient presents to ED with complaints of bilateral foot pain and swelling, patient does endorse a history of gout and recently started on Colchicine and feels like this has made things worse.        Britton Nieto RN  06/06/22 2056

## 2022-06-07 NOTE — ED NOTES
Pt has d/c order. D/C instructions given. Prescriptions given. Pt verbalized understanding. Pt out to lobby.          Jerod Vasquez RN  06/06/22 [id number]

## 2022-06-07 NOTE — ED NOTES
file.  She reports that she has never smoked. She has never used smokeless tobacco. She reports that she does not drink alcohol and does not use drugs. Medications     Discharge Medication List as of 6/6/2022  9:25 PM      CONTINUE these medications which have NOT CHANGED    Details   allopurinol (ZYLOPRIM) 100 MG tablet Historical Med      sodium bicarbonate 650 MG tablet TAKE 1 TABLET (650 MG TOTAL) BY MOUTH 3 TIMES DAILY. Historical Med      sodium zirconium cyclosilicate (LOKELMA) 10 g PACK oral suspension Take 10 g by mouth Every Monday, Wednesday, and FridayHistorical Med      lidocaine (LIDODERM) 5 % Place 1 patch onto the skin daily 12 hours on, 12 hours off., Disp-30 patch, R-0Normal      cyclobenzaprine (FLEXERIL) 10 MG tablet Take 1 tablet by mouth 3 times daily as needed for Muscle spasms, Disp-15 tablet, R-0Normal      ticagrelor (BRILINTA) 60 MG TABS tablet TAKE 1 TABLET TWICE A DAY, Disp-180 tablet, R-3YOUR PATIENT HAS REQUESTED A REFILL OF THIS MEDICATION, PREVIOUSLY AUTHORIZED BY ANOTHER PRESCRIBER. Normal      Evolocumab (REPATHA SURECLICK) 867 MG/ML SOAJ INJECT CONTENTS OF 1 PEN SUBCUTANEOUSLY EVERY 14 DAYS, Disp-2 pen, R-6Normal      aspirin 81 MG EC tablet Take 81 mg by mouth dailyHistorical Med      glipiZIDE (GLUCOTROL XL) 2.5 MG extended release tablet Take 2.5 mg by mouth 2 times daily (with meals)Historical Med      metoprolol tartrate (LOPRESSOR) 25 MG tablet Take 25 mg by mouth 2 times dailyHistorical Med      hydrALAZINE (APRESOLINE) 50 MG tablet Take 50 mg by mouth 2 times dailyHistorical Med      sertraline (ZOLOFT) 50 MG tablet Take 50 mg by mouth dailyHistorical Med      vitamin D (CHOLECALCIFEROL) 25 MCG (1000 UT) TABS tablet Take 1,000 Units by mouth dailyHistorical Med             Allergies     She is allergic to crestor [rosuvastatin calcium] and statins.     Physical Exam     INITIAL VITALS: BP (!) 203/89   Pulse 91   Temp 97.8 °F (36.6 °C) (Axillary)   Resp 18   Ht 5' (1.524 m)   Wt 222 lb (100.7 kg)   SpO2 98%   BMI 43.36 kg/m²      Physical Exam      Diagnostic Results     RADIOLOGY:  No orders to display          LABS:   Labs Reviewed   POCT GLUCOSE - Abnormal; Notable for the following components:       Result Value    POC Glucose 157 (*)     All other components within normal limits   POCT GLUCOSE       RECENT VITALS:  BP: (!) 203/89, Temp: 97.8 °F (36.6 °C), Heart Rate: 91,Resp: 18     Procedures     None    ED Course     The patient was given the following medications:  Orders Placed This Encounter   Medications    predniSONE (DELTASONE) tablet 40 mg    predniSONE (DELTASONE) 20 MG tablet     Sig: Take 2 tablets by mouth daily for 3 days, THEN 1 tablet daily for 2 days, THEN 0.5 tablets daily for 2 days. Dispense:  9 tablet     Refill:  0            CONSULTS:  None    MEDICAL DECISION MAKING     Roxanna Cervantes is a 67 y.o. female who presents with polyarthralgia. Based on her prior hx, this is very consistent with a more severe version of her gouty flares. Very low concern for infectious etiology given involvement of mulitple joints and no systemic sxs. Pt hypertensive and mildly tachycardic during ED stay but this is very likely related to the distress from pain from her gouty flare. Given her CKD and GERD, steroids are likely a better option than NSAIDs and colchicine was unsuccessful in treating her flare. Given one dose of pred 40mg here in the ED then prescribed a 7d prednisone taper. This patient was also evaluated by the attending physician. All care plans were discussed and agreed upon. Clinical Impression     1. Acute idiopathic gout involving toe of left foot        Disposition/Plan     PATIENT REFERRED TO:  No follow-up provider specified.     DISCHARGE MEDICATIONS:  Discharge Medication List as of 6/6/2022  9:25 PM      START taking these medications    Details   predniSONE (DELTASONE) 20 MG tablet Take 2 tablets by mouth daily for 3 days, THEN 1 tablet daily for 2 days, THEN 0.5 tablets daily for 2 days. , Disp-9 tablet, R-0Print             DISPOSITION Decision To Discharge 06/06/2022 09:30:56 PM       Ondina Russo MD  Resident  06/06/22 [id number]

## 2022-06-07 NOTE — ED PROVIDER NOTES
ED Attending Attestation Note     Date of evaluation: 6/6/2022    This patient was seen by the resident. I have seen and examined the patient, agree with the workup, evaluation, management and diagnosis. The care plan has been discussed. My assessment reveals elderly female generally well in appearance and nontoxic presents with pain in her bilateral basilar great toes, as well as in the right ankle and right knee consistent with what she says is attack of her gout. She says this feels the same as her gout to her in terms of the description of the pain, has no systemic symptoms. Has been several months she says since she had a prescription for steroids for similar gouty attack, and she did try taking colchicine this time but with minimal to no relief. We did discuss with her that it may lead to some elevation in blood sugar and she will need to watch it, but otherwise think the patient can be most effectively treated it seems now with glucocorticoids for the next several days. Joints do not appear particularly erythematous, and she is able to range, and the polyarticular nature in the setting of history of gout that she says feels just like her previous gout makes it less likely seems to be septic joint.        Chuck Barragan MD  06/06/22 [id number]

## 2022-06-13 NOTE — ED PROVIDER NOTES
Date of evaluation: 2022     Chief Complaint   Gout        Nursing Notes, Past Medical Hx, Past Surgical Hx, Social Hx,Allergies, and Family Hx were reviewed.     History of Present Illness      Ally Drake is a 67 y.o. female PMHx CAD, HTN, T2DM, GERD, gout who presents with toe, ankle, and knee pain. She has had frequent gout attacks in the past most recently about a month ago. She has had steroid tapers in the past during attacks which has helped. She is on a stable dose of allopurinol which she has had for years. She developed L great toe pain yesterday consistent with her prior episodes of gout and took some colchicine she had been previously prescribed but this did not help and she then developed pain/swelling in her R ankle and knee as well. Denies fevers/chills.     She is going to [facility] in a couple days and is anxious to be able to enjoy her vacation.     Review of Systems   Review of Systems   Constitutional: Negative for chills, fatigue and fever. Respiratory: Negative for cough and shortness of breath. Cardiovascular: Negative for chest pain and palpitations. Gastrointestinal: Negative for abdominal distention, abdominal pain, constipation, diarrhea, nausea and vomiting. Musculoskeletal: Positive for arthralgias. Negative for gait problem, myalgias and neck pain.    Neurological: Negative for dizziness, light-headedness and headaches.            Past Medical, Surgical, Family, and Social History      Past Medical History             Diagnosis Date    CAD (coronary artery disease)      CAD (coronary artery disease) 10/20/2016     Distal RCA 3.0 x 15 mm [employer] CRISTEL    Diabetes mellitus ([facility] [address]      Essential hypertension      Gout      Hyperlipidemia           Past Surgical History             Procedure Laterality Date    ANGIOPLASTY        GS    APPENDECTOMY        BREAST BIOPSY         SECTION        CHOLECYSTECTOMY        CT BIOPSY RENAL   2021     CT BIOPSY RENAL 6/17/2021 [facility] CT SCAN    HERNIA REPAIR             Her family history is not on file. She reports that she has never smoked. She has never used smokeless tobacco. She reports that she does not drink alcohol and does not use drugs.     Medications          Discharge Medication List as of 6/6/2022  9:25 PM           CONTINUE these medications which have NOT CHANGED     Details   allopurinol (ZYLOPRIM) 100 MG tablet Historical Med       sodium bicarbonate 650 MG tablet TAKE 1 TABLET (650 MG TOTAL) BY MOUTH 3 TIMES DAILY. Historical Med       sodium zirconium cyclosilicate (LOKELMA) 10 g PACK oral suspension Take 10 g by mouth Every Monday, Wednesday, and FridayHistorical Med       lidocaine (LIDODERM) 5 % Place 1 patch onto the skin daily 12 hours on, 12 hours off., Disp-30 patch, R-0Normal       cyclobenzaprine (FLEXERIL) 10 MG tablet Take 1 tablet by mouth 3 times daily as needed for Muscle spasms, Disp-15 tablet, R-0Normal       ticagrelor (BRILINTA) 60 MG TABS tablet TAKE 1 TABLET TWICE A DAY, Disp-180 tablet, R-3YOUR PATIENT HAS REQUESTED A REFILL OF THIS MEDICATION, PREVIOUSLY AUTHORIZED BY ANOTHER PRESCRIBER. Normal       Evolocumab (REPATHA SURECLICK) 612 MG/ML SOAJ INJECT CONTENTS OF 1 PEN SUBCUTANEOUSLY EVERY 14 DAYS, Disp-2 pen, R-6Normal       aspirin 81 MG EC tablet Take 81 mg by mouth dailyHistorical Med       glipiZIDE (GLUCOTROL XL) 2.5 MG extended release tablet Take 2.5 mg by mouth 2 times daily (with meals)Historical Med       metoprolol tartrate (LOPRESSOR) 25 MG tablet Take 25 mg by mouth 2 times dailyHistorical Med       hydrALAZINE (APRESOLINE) 50 MG tablet Take 50 mg by mouth 2 times dailyHistorical Med       sertraline (ZOLOFT) 50 MG tablet Take 50 mg by mouth dailyHistorical Med       vitamin D (CHOLECALCIFEROL) 25 MCG (1000 UT) TABS tablet Take 1,000 Units by mouth dailyHistorical Med                 Allergies      She is allergic to crestor [rosuvastatin calcium] and statins.     Physical Exam      INITIAL VITALS: BP (!) 203/89   Pulse 91   Temp 97.8 °F (36.6 °C) (Axillary)   Resp 18   Ht 5' (1.524 m)   Wt 222 lb (100.7 kg)   SpO2 98%   BMI 43.36 kg/m²       Physical Exam        Diagnostic Results      RADIOLOGY:  No orders to display            LABS:         Labs Reviewed   POCT GLUCOSE - Abnormal; Notable for the following components:       Result Value      POC Glucose 157 (*)       All other components within normal limits   POCT GLUCOSE         RECENT VITALS:  BP: (!) 203/89, Temp: 97.8 °F (36.6 °C), Heart Rate: 91,Resp: 18      Procedures      None     ED Course      The patient was given the following medications:  Encounter Medications         Orders Placed This Encounter   Medications    predniSONE (DELTASONE) tablet 40 mg    predniSONE (DELTASONE) 20 MG tablet       Sig: Take 2 tablets by mouth daily for 3 days, THEN 1 tablet daily for 2 days, THEN 0.5 tablets daily for 2 days.       Dispense:  9 tablet       Refill:  0               CONSULTS:  None     MEDICAL DECISION MAKING      Broderick Cordova is a 67 y.o. female who presents with polyarthralgia. Based on her prior hx, this is very consistent with a more severe version of her gouty flares. Very low concern for infectious etiology given involvement of mulitple joints and no systemic sxs. Pt hypertensive and mildly tachycardic during ED stay but this is very likely related to the distress from pain from her gouty flare. Given her CKD and GERD, steroids are likely a better option than NSAIDs and colchicine was unsuccessful in treating her flare. Given one dose of pred 40mg here in the ED then prescribed a 7d prednisone taper.     This patient was also evaluated by the attending physician. All care plans were discussed and agreed upon.     Clinical Impression      1.  Acute idiopathic gout involving toe of left foot          Disposition/Plan      PATIENT REFERRED TO:  No follow-up provider specified.     DISCHARGE MEDICATIONS:      Discharge Medication List as of 6/6/2022  9:25 PM           START taking these medications     Details   predniSONE (DELTASONE) 20 MG tablet Take 2 tablets by mouth daily for 3 days, THEN 1 tablet daily for 2 days, THEN 0.5 tablets daily for 2 days. , Disp-9 tablet, R-0Print                 DISPOSITION Decision To Discharge 06/06/2022 09:30:56 PM        Janae Myers MD  Resident  06/06/22 [id number]          Janae Myers MD  Resident  06/13/22 8228

## 2022-08-09 RX ORDER — EVOLOCUMAB 140 MG/ML
INJECTION, SOLUTION SUBCUTANEOUS
Qty: 6 PEN | Refills: 3 | Status: SHIPPED | OUTPATIENT
Start: 2022-08-09

## 2022-08-18 ENCOUNTER — OFFICE VISIT (OUTPATIENT)
Dept: CARDIOLOGY CLINIC | Age: 72
End: 2022-08-18
Payer: MEDICARE

## 2022-08-18 VITALS
SYSTOLIC BLOOD PRESSURE: 120 MMHG | BODY MASS INDEX: 43 KG/M2 | WEIGHT: 219 LBS | HEIGHT: 60 IN | DIASTOLIC BLOOD PRESSURE: 72 MMHG | HEART RATE: 62 BPM

## 2022-08-18 DIAGNOSIS — Z98.61 CORONARY ANGIOPLASTY STATUS: ICD-10-CM

## 2022-08-18 DIAGNOSIS — I10 ESSENTIAL HYPERTENSION: ICD-10-CM

## 2022-08-18 DIAGNOSIS — E78.5 HYPERLIPIDEMIA LDL GOAL <70: Primary | ICD-10-CM

## 2022-08-18 PROCEDURE — 99214 OFFICE O/P EST MOD 30 MIN: CPT | Performed by: INTERNAL MEDICINE

## 2022-08-18 PROCEDURE — 1123F ACP DISCUSS/DSCN MKR DOCD: CPT | Performed by: INTERNAL MEDICINE

## 2022-08-18 NOTE — PROGRESS NOTES
(FLEXERIL) 10 MG tablet Take 1 tablet by mouth 3 times daily as needed for Muscle spasms 15 tablet 0    ticagrelor (BRILINTA) 60 MG TABS tablet TAKE 1 TABLET TWICE A  tablet 3    aspirin 81 MG EC tablet Take 81 mg by mouth daily      glipiZIDE (GLUCOTROL XL) 2.5 MG extended release tablet Take 2.5 mg by mouth 2 times daily (with meals)      metoprolol tartrate (LOPRESSOR) 25 MG tablet Take 25 mg by mouth 2 times daily      hydrALAZINE (APRESOLINE) 50 MG tablet Take 50 mg by mouth 2 times daily      sertraline (ZOLOFT) 50 MG tablet Take 50 mg by mouth daily      vitamin D (CHOLECALCIFEROL) 25 MCG (1000 UT) TABS tablet Take 1,000 Units by mouth daily       No current facility-administered medications for this visit. REVIEW OF SYSTEMS:    CONSTITUTIONAL: No major weight gain or loss, fatigue, weakness, night sweats or fever. HEENT: No new vision difficulties or ringing in the ears. RESPIRATORY: No new SOB, PND, orthopnea or cough. CARDIOVASCULAR: See HPI  GI: No nausea, vomiting, diarrhea, constipation, abdominal pain or changes in bowel habits. : No urinary frequency, urgency, incontinence hematuria or dysuria. SKIN: No cyanosis or skin lesions. MUSCULOSKELETAL: No new muscle or joint pain. NEUROLOGICAL: No syncope or TIA-like symptoms. PSYCHIATRIC: No anxiety, pain, insomnia or depression    Objective:   PHYSICAL EXAM:        VITALS:    Wt Readings from Last 3 Encounters:   08/18/22 219 lb (99.3 kg)   06/06/22 222 lb (100.7 kg)   02/17/22 223 lb (101.2 kg)     BP Readings from Last 3 Encounters:   08/18/22 120/72   06/06/22 (!) 203/89   05/21/22 (!) 195/86     Pulse Readings from Last 3 Encounters:   08/18/22 62   06/06/22 91   05/21/22 70       CONSTITUTIONAL: Cooperative, no apparent distress, and appears well nourished / developed  NEUROLOGIC:  Awake and orientated to person, place and time. PSYCH: Calm affect. SKIN: Warm and dry.   HEENT: Sclera non-icteric, normocephalic, neck supple, no elevation of JVP, normal carotid pulses with no bruits and thyroid normal size. LUNGS:  No increased work of breathing and clear to auscultation, no crackles or wheezing  CARDIOVASCULAR:  Regular rate and rhythm with no murmurs, gallops, rubs, or abnormal heart sounds, normal PMI. The apical impulses not displaced  Heart tones are crisp and normal  Cervical veins are not engorged  The carotid upstroke is normal in amplitude and contour without delay or bruit  JVP is not elevated  ABDOMEN:  Normal bowel sounds, non-distended and non-tender to palpation  EXT: No edema, no calf tenderness. Pulses are present bilaterally. DATA:    Lab Results   Component Value Date    ALT 20 02/17/2022    AST 25 02/17/2022    ALKPHOS 79 02/17/2022    BILITOT 0.3 02/17/2022     Lab Results   Component Value Date    CREATININE 3.1 (H) 02/17/2022    BUN 40 (H) 02/17/2022     02/17/2022    K 4.9 02/17/2022     02/17/2022    CO2 23 02/17/2022     No results found for: TSH, E7XSMJK, N0OQXDV, THYROIDAB  Lab Results   Component Value Date    WBC 8.5 02/04/2022    HGB 10.9 (L) 02/04/2022    HCT 33.8 (L) 02/04/2022    MCV 95.7 02/04/2022     02/04/2022     No components found for: CHLPL  Lab Results   Component Value Date    TRIG 225 (H) 02/17/2022    TRIG 166 (H) 08/16/2021    TRIG 192 (H) 02/14/2020     Lab Results   Component Value Date    HDL 37 (L) 02/17/2022    HDL 38 (L) 08/16/2021    HDL 41 02/14/2020     Lab Results   Component Value Date    LDLCALC 120 (H) 02/17/2022    LDLCALC 83 08/16/2021    LDLCALC 73 02/14/2020     Lab Results   Component Value Date    LABVLDL 45 02/17/2022    LABVLDL 33 08/16/2021    LABVLDL 38 02/14/2020     Radiology Review:  Pertinent images / reports were reviewed as a part of this visit and reveals the following:    Last Echo: We will get echocardiogram.    Last Stress Test / Angiogram: See cath reports    Last ECG: On 11/18/2020 sinus rhythm. Possible old septal infarct. 81/min. EKG on 2/17/2022 sinus rhythm 72/min. Possible septal infarct. Pattern. This patient was educated using the patient point room wall mount device. Absence from smokers and smoking and diet and exercising are important. Assessment:   CAD and post PTCA stents. Hyperlipidemia on Repatha    Plan:   Return to see us in 6 months. All else looks very stable for her. Please call if we can assist further [phone]. Antoine Gamble.  Ivett TY, [facility]      This note was likely completed using voice recognition technology and may contain unintended errors n/a

## 2022-11-17 ENCOUNTER — HOSPITAL ENCOUNTER (INPATIENT)
Age: 72
LOS: 3 days | Discharge: HOME | DRG: 394 | End: 2022-11-21
Attending: EMERGENCY MEDICINE | Admitting: INTERNAL MEDICINE
Payer: MEDICARE

## 2022-11-17 DIAGNOSIS — K92.2 GASTROINTESTINAL HEMORRHAGE, UNSPECIFIED GASTROINTESTINAL HEMORRHAGE TYPE: ICD-10-CM

## 2022-11-17 DIAGNOSIS — K92.2 ACUTE GI BLEEDING: ICD-10-CM

## 2022-11-17 DIAGNOSIS — K92.2 ACUTE GASTROINTESTINAL HEMORRHAGE: Primary | ICD-10-CM

## 2022-11-17 PROCEDURE — 99285 EMERGENCY DEPT VISIT HI MDM: CPT

## 2022-11-17 PROCEDURE — 93005 ELECTROCARDIOGRAM TRACING: CPT | Performed by: EMERGENCY MEDICINE

## 2022-11-17 PROCEDURE — 96374 THER/PROPH/DIAG INJ IV PUSH: CPT

## 2022-11-17 ASSESSMENT — PAIN - FUNCTIONAL ASSESSMENT: PAIN_FUNCTIONAL_ASSESSMENT: NONE - DENIES PAIN

## 2022-11-18 ENCOUNTER — APPOINTMENT (OUTPATIENT)
Dept: GENERAL RADIOLOGY | Age: 72
DRG: 394 | End: 2022-11-18
Payer: MEDICARE

## 2022-11-18 ENCOUNTER — ANESTHESIA (OUTPATIENT)
Dept: ENDOSCOPY | Age: 72
DRG: 394 | End: 2022-11-18
Payer: MEDICARE

## 2022-11-18 ENCOUNTER — ANESTHESIA EVENT (OUTPATIENT)
Dept: ENDOSCOPY | Age: 72
DRG: 394 | End: 2022-11-18
Payer: MEDICARE

## 2022-11-18 PROBLEM — K92.2 GI BLEED: Status: ACTIVE | Noted: 2022-11-18

## 2022-11-18 LAB
A/G RATIO: 1.4 (ref 1.1–2.2)
ABO/RH: NORMAL
ALBUMIN SERPL-MCNC: 4.1 G/DL (ref 3.4–5)
ALP BLD-CCNC: 65 U/L (ref 40–129)
ALT SERPL-CCNC: 14 U/L (ref 10–40)
ANION GAP SERPL CALCULATED.3IONS-SCNC: 15 MMOL/L (ref 3–16)
ANTIBODY SCREEN: NORMAL
AST SERPL-CCNC: 19 U/L (ref 15–37)
BASOPHILS ABSOLUTE: 0 K/UL (ref 0–0.2)
BASOPHILS RELATIVE PERCENT: 0.4 %
BILIRUB SERPL-MCNC: <0.2 MG/DL (ref 0–1)
BUN BLDV-MCNC: 66 MG/DL (ref 7–20)
CALCIUM SERPL-MCNC: 9.3 MG/DL (ref 8.3–10.6)
CHLORIDE BLD-SCNC: 106 MMOL/L (ref 99–110)
CO2: 19 MMOL/L (ref 21–32)
CREAT SERPL-MCNC: 3.7 MG/DL (ref 0.6–1.2)
EKG ATRIAL RATE: 96 BPM
EKG DIAGNOSIS: NORMAL
EKG P AXIS: 44 DEGREES
EKG P-R INTERVAL: 174 MS
EKG Q-T INTERVAL: 350 MS
EKG QRS DURATION: 78 MS
EKG QTC CALCULATION (BAZETT): 442 MS
EKG R AXIS: -4 DEGREES
EKG T AXIS: 43 DEGREES
EKG VENTRICULAR RATE: 96 BPM
EOSINOPHILS ABSOLUTE: 0.2 K/UL (ref 0–0.6)
EOSINOPHILS RELATIVE PERCENT: 1.8 %
GFR SERPL CREATININE-BSD FRML MDRD: 12 ML/MIN/{1.73_M2}
GLUCOSE BLD-MCNC: 107 MG/DL (ref 70–99)
HCT VFR BLD CALC: 19 % (ref 36–48)
HCT VFR BLD CALC: 22.9 % (ref 36–48)
HEMOGLOBIN: 6.3 G/DL (ref 12–16)
HEMOGLOBIN: 7.3 G/DL (ref 12–16)
LYMPHOCYTES ABSOLUTE: 1.7 K/UL (ref 1–5.1)
LYMPHOCYTES RELATIVE PERCENT: 19.8 %
MCH RBC QN AUTO: 32 PG (ref 26–34)
MCHC RBC AUTO-ENTMCNC: 33.2 G/DL (ref 31–36)
MCV RBC AUTO: 96.4 FL (ref 80–100)
MONOCYTES ABSOLUTE: 0.7 K/UL (ref 0–1.3)
MONOCYTES RELATIVE PERCENT: 8.1 %
NEUTROPHILS ABSOLUTE: 6 K/UL (ref 1.7–7.7)
NEUTROPHILS RELATIVE PERCENT: 69.9 %
OCCULT BLOOD DIAGNOSTIC: ABNORMAL
PDW BLD-RTO: 15.2 % (ref 12.4–15.4)
PLATELET # BLD: 294 K/UL (ref 135–450)
PMV BLD AUTO: 7.3 FL (ref 5–10.5)
POTASSIUM REFLEX MAGNESIUM: 4.7 MMOL/L (ref 3.5–5.1)
RBC # BLD: 1.97 M/UL (ref 4–5.2)
SODIUM BLD-SCNC: 140 MMOL/L (ref 136–145)
TOTAL PROTEIN: 7.1 G/DL (ref 6.4–8.2)
TROPONIN: 0.05 NG/ML
WBC # BLD: 8.6 K/UL (ref 4–11)

## 2022-11-18 PROCEDURE — 2580000003 HC RX 258: Performed by: EMERGENCY MEDICINE

## 2022-11-18 PROCEDURE — 7100000010 HC PHASE II RECOVERY - FIRST 15 MIN: Performed by: INTERNAL MEDICINE

## 2022-11-18 PROCEDURE — 86850 RBC ANTIBODY SCREEN: CPT

## 2022-11-18 PROCEDURE — 6360000002 HC RX W HCPCS: Performed by: REGISTERED NURSE

## 2022-11-18 PROCEDURE — 88342 IMHCHEM/IMCYTCHM 1ST ANTB: CPT

## 2022-11-18 PROCEDURE — 6360000002 HC RX W HCPCS: Performed by: NURSE ANESTHETIST, CERTIFIED REGISTERED

## 2022-11-18 PROCEDURE — 80053 COMPREHEN METABOLIC PANEL: CPT

## 2022-11-18 PROCEDURE — C9113 INJ PANTOPRAZOLE SODIUM, VIA: HCPCS | Performed by: INTERNAL MEDICINE

## 2022-11-18 PROCEDURE — 84484 ASSAY OF TROPONIN QUANT: CPT

## 2022-11-18 PROCEDURE — 2500000003 HC RX 250 WO HCPCS: Performed by: NURSE ANESTHETIST, CERTIFIED REGISTERED

## 2022-11-18 PROCEDURE — 85025 COMPLETE CBC W/AUTO DIFF WBC: CPT

## 2022-11-18 PROCEDURE — 86901 BLOOD TYPING SEROLOGIC RH(D): CPT

## 2022-11-18 PROCEDURE — 2709999900 HC NON-CHARGEABLE SUPPLY: Performed by: INTERNAL MEDICINE

## 2022-11-18 PROCEDURE — 86923 COMPATIBILITY TEST ELECTRIC: CPT

## 2022-11-18 PROCEDURE — 3700000001 HC ADD 15 MINUTES (ANESTHESIA): Performed by: INTERNAL MEDICINE

## 2022-11-18 PROCEDURE — 71046 X-RAY EXAM CHEST 2 VIEWS: CPT

## 2022-11-18 PROCEDURE — A4216 STERILE WATER/SALINE, 10 ML: HCPCS | Performed by: INTERNAL MEDICINE

## 2022-11-18 PROCEDURE — 36415 COLL VENOUS BLD VENIPUNCTURE: CPT

## 2022-11-18 PROCEDURE — 2580000003 HC RX 258: Performed by: INTERNAL MEDICINE

## 2022-11-18 PROCEDURE — 88305 TISSUE EXAM BY PATHOLOGIST: CPT

## 2022-11-18 PROCEDURE — C9113 INJ PANTOPRAZOLE SODIUM, VIA: HCPCS | Performed by: REGISTERED NURSE

## 2022-11-18 PROCEDURE — P9016 RBC LEUKOCYTES REDUCED: HCPCS

## 2022-11-18 PROCEDURE — 6370000000 HC RX 637 (ALT 250 FOR IP): Performed by: INTERNAL MEDICINE

## 2022-11-18 PROCEDURE — 85014 HEMATOCRIT: CPT

## 2022-11-18 PROCEDURE — 3609012400 HC EGD TRANSORAL BIOPSY SINGLE/MULTIPLE: Performed by: INTERNAL MEDICINE

## 2022-11-18 PROCEDURE — 7100000011 HC PHASE II RECOVERY - ADDTL 15 MIN: Performed by: INTERNAL MEDICINE

## 2022-11-18 PROCEDURE — 85018 HEMOGLOBIN: CPT

## 2022-11-18 PROCEDURE — 36430 TRANSFUSION BLD/BLD COMPNT: CPT

## 2022-11-18 PROCEDURE — 82270 OCCULT BLOOD FECES: CPT

## 2022-11-18 PROCEDURE — 2060000000 HC ICU INTERMEDIATE R&B

## 2022-11-18 PROCEDURE — 6360000002 HC RX W HCPCS: Performed by: INTERNAL MEDICINE

## 2022-11-18 PROCEDURE — 86900 BLOOD TYPING SEROLOGIC ABO: CPT

## 2022-11-18 PROCEDURE — 0DB68ZX EXCISION OF STOMACH, VIA NATURAL OR ARTIFICIAL OPENING ENDOSCOPIC, DIAGNOSTIC: ICD-10-PCS | Performed by: INTERNAL MEDICINE

## 2022-11-18 PROCEDURE — 3700000000 HC ANESTHESIA ATTENDED CARE: Performed by: INTERNAL MEDICINE

## 2022-11-18 RX ORDER — PROPOFOL 10 MG/ML
INJECTION, EMULSION INTRAVENOUS CONTINUOUS PRN
Status: DISCONTINUED | OUTPATIENT
Start: 2022-11-18 | End: 2022-11-18 | Stop reason: SDUPTHER

## 2022-11-18 RX ORDER — ONDANSETRON 4 MG/1
4 TABLET, ORALLY DISINTEGRATING ORAL EVERY 8 HOURS PRN
Status: DISCONTINUED | OUTPATIENT
Start: 2022-11-18 | End: 2022-11-21 | Stop reason: HOSPADM

## 2022-11-18 RX ORDER — SODIUM CHLORIDE 9 MG/ML
INJECTION, SOLUTION INTRAVENOUS PRN
Status: DISCONTINUED | OUTPATIENT
Start: 2022-11-18 | End: 2022-11-21 | Stop reason: HOSPADM

## 2022-11-18 RX ORDER — PANTOPRAZOLE SODIUM 40 MG/10ML
40 INJECTION, POWDER, LYOPHILIZED, FOR SOLUTION INTRAVENOUS ONCE
Status: COMPLETED | OUTPATIENT
Start: 2022-11-18 | End: 2022-11-18

## 2022-11-18 RX ORDER — LIDOCAINE HYDROCHLORIDE 20 MG/ML
INJECTION, SOLUTION EPIDURAL; INFILTRATION; INTRACAUDAL; PERINEURAL PRN
Status: DISCONTINUED | OUTPATIENT
Start: 2022-11-18 | End: 2022-11-18 | Stop reason: SDUPTHER

## 2022-11-18 RX ORDER — SODIUM CHLORIDE 0.9 % (FLUSH) 0.9 %
5-40 SYRINGE (ML) INJECTION EVERY 12 HOURS SCHEDULED
Status: DISCONTINUED | OUTPATIENT
Start: 2022-11-18 | End: 2022-11-21 | Stop reason: HOSPADM

## 2022-11-18 RX ORDER — SODIUM CHLORIDE 9 MG/ML
INJECTION, SOLUTION INTRAVENOUS CONTINUOUS
Status: DISCONTINUED | OUTPATIENT
Start: 2022-11-18 | End: 2022-11-19

## 2022-11-18 RX ORDER — ONDANSETRON 2 MG/ML
4 INJECTION INTRAMUSCULAR; INTRAVENOUS EVERY 6 HOURS PRN
Status: DISCONTINUED | OUTPATIENT
Start: 2022-11-18 | End: 2022-11-21 | Stop reason: HOSPADM

## 2022-11-18 RX ORDER — SODIUM CHLORIDE 9 MG/ML
INJECTION, SOLUTION INTRAVENOUS ONCE
Status: COMPLETED | OUTPATIENT
Start: 2022-11-18 | End: 2022-11-18

## 2022-11-18 RX ORDER — ACETAMINOPHEN 325 MG/1
650 TABLET ORAL EVERY 6 HOURS PRN
Status: DISCONTINUED | OUTPATIENT
Start: 2022-11-18 | End: 2022-11-21 | Stop reason: HOSPADM

## 2022-11-18 RX ORDER — SODIUM CHLORIDE 0.9 % (FLUSH) 0.9 %
5-40 SYRINGE (ML) INJECTION PRN
Status: DISCONTINUED | OUTPATIENT
Start: 2022-11-18 | End: 2022-11-21 | Stop reason: HOSPADM

## 2022-11-18 RX ORDER — 0.9 % SODIUM CHLORIDE 0.9 %
500 INTRAVENOUS SOLUTION INTRAVENOUS ONCE
Status: COMPLETED | OUTPATIENT
Start: 2022-11-18 | End: 2022-11-18

## 2022-11-18 RX ORDER — PROPOFOL 10 MG/ML
INJECTION, EMULSION INTRAVENOUS PRN
Status: DISCONTINUED | OUTPATIENT
Start: 2022-11-18 | End: 2022-11-18 | Stop reason: SDUPTHER

## 2022-11-18 RX ORDER — ACETAMINOPHEN 650 MG/1
650 SUPPOSITORY RECTAL EVERY 6 HOURS PRN
Status: DISCONTINUED | OUTPATIENT
Start: 2022-11-18 | End: 2022-11-21 | Stop reason: HOSPADM

## 2022-11-18 RX ADMIN — PANTOPRAZOLE SODIUM 40 MG: 40 INJECTION, POWDER, FOR SOLUTION INTRAVENOUS at 02:04

## 2022-11-18 RX ADMIN — SERTRALINE HYDROCHLORIDE 50 MG: 50 TABLET ORAL at 18:43

## 2022-11-18 RX ADMIN — SODIUM CHLORIDE: 9 INJECTION, SOLUTION INTRAVENOUS at 12:43

## 2022-11-18 RX ADMIN — ACETAMINOPHEN 650 MG: 325 TABLET ORAL at 15:17

## 2022-11-18 RX ADMIN — LIDOCAINE HYDROCHLORIDE 75 MG: 20 INJECTION, SOLUTION EPIDURAL; INFILTRATION; INTRACAUDAL; PERINEURAL at 14:00

## 2022-11-18 RX ADMIN — PROPOFOL 125 MCG/KG/MIN: 10 INJECTION, EMULSION INTRAVENOUS at 14:00

## 2022-11-18 RX ADMIN — PROPOFOL 100 MG: 10 INJECTION, EMULSION INTRAVENOUS at 14:00

## 2022-11-18 RX ADMIN — POLYETHYLENE GLYCOL 3350, SODIUM SULFATE ANHYDROUS, SODIUM BICARBONATE, SODIUM CHLORIDE, POTASSIUM CHLORIDE 2000 ML: 236; 22.74; 6.74; 5.86; 2.97 POWDER, FOR SOLUTION ORAL at 16:22

## 2022-11-18 RX ADMIN — SODIUM CHLORIDE, PRESERVATIVE FREE 10 ML: 5 INJECTION INTRAVENOUS at 08:32

## 2022-11-18 RX ADMIN — SODIUM CHLORIDE: 9 INJECTION, SOLUTION INTRAVENOUS at 23:10

## 2022-11-18 RX ADMIN — SODIUM CHLORIDE: 9 INJECTION, SOLUTION INTRAVENOUS at 05:03

## 2022-11-18 RX ADMIN — SODIUM CHLORIDE 80 MG: 9 INJECTION INTRAMUSCULAR; INTRAVENOUS; SUBCUTANEOUS at 03:59

## 2022-11-18 RX ADMIN — POLYETHYLENE GLYCOL 3350, SODIUM SULFATE ANHYDROUS, SODIUM BICARBONATE, SODIUM CHLORIDE, POTASSIUM CHLORIDE 2000 ML: 236; 22.74; 6.74; 5.86; 2.97 POWDER, FOR SOLUTION ORAL at 21:00

## 2022-11-18 RX ADMIN — SODIUM CHLORIDE 500 ML: 9 INJECTION, SOLUTION INTRAVENOUS at 03:37

## 2022-11-18 RX ADMIN — SODIUM CHLORIDE, PRESERVATIVE FREE 10 ML: 5 INJECTION INTRAVENOUS at 21:00

## 2022-11-18 ASSESSMENT — PAIN SCALES - GENERAL
PAINLEVEL_OUTOF10: 4
PAINLEVEL_OUTOF10: 0
PAINLEVEL_OUTOF10: 4
PAINLEVEL_OUTOF10: 0
PAINLEVEL_OUTOF10: 4

## 2022-11-18 ASSESSMENT — PAIN - FUNCTIONAL ASSESSMENT
PAIN_FUNCTIONAL_ASSESSMENT: NONE - DENIES PAIN
PAIN_FUNCTIONAL_ASSESSMENT: ACTIVITIES ARE NOT PREVENTED
PAIN_FUNCTIONAL_ASSESSMENT: NONE - DENIES PAIN

## 2022-11-18 ASSESSMENT — PAIN DESCRIPTION - FREQUENCY: FREQUENCY: CONTINUOUS

## 2022-11-18 ASSESSMENT — PAIN DESCRIPTION - LOCATION
LOCATION: HEAD
LOCATION: HEAD

## 2022-11-18 ASSESSMENT — PAIN DESCRIPTION - DESCRIPTORS: DESCRIPTORS: OTHER (COMMENT)

## 2022-11-18 ASSESSMENT — PAIN DESCRIPTION - PAIN TYPE: TYPE: ACUTE PAIN

## 2022-11-18 NOTE — ED TRIAGE NOTES
Patient comes in with ractal bleeding for the last week. Patients PCP told her they were hemroids and to stop taking Asprin. Patient also states she is SOB since earlier today.

## 2022-11-18 NOTE — ED NOTES
SBAR report given to [facility] RN   Questions answered to [id number], RN  11/18/22 0733

## 2022-11-18 NOTE — ED PROVIDER NOTES
ED Attending Attestation Note     Date of evaluation: 11/17/2022    This patient was seen by the advance practice provider. I have seen and examined the patient, agree with the workup, evaluation, management and diagnosis. The care plan has been discussed. I have reviewed the ECG and concur with the KYLEIGH's interpretation. My assessment reveals a 55-year-old -American female who presents with reports of melena. On examination she has pale sclera. Soft abdomen. Unlabored breathing.     Diagnostic Results     RADIOLOGY:  XR CHEST (2 VW)   Final Result      No acute cardiopulmonary disease          LABS:   Results for orders placed or performed during the hospital encounter of 11/17/22   Comprehensive Metabolic Panel w/ Reflex to MG   Result Value Ref Range    Sodium 140 136 - 145 mmol/L    Potassium reflex Magnesium 4.7 3.5 - 5.1 mmol/L    Chloride 106 99 - 110 mmol/L    CO2 19 (L) 21 - 32 mmol/L    Anion Gap 15 3 - 16    Glucose 107 (H) 70 - 99 mg/dL    BUN 66 (H) 7 - 20 mg/dL    Creatinine 3.7 (H) 0.6 - 1.2 mg/dL    Est, Glom Filt Rate 12 (A) >60    Calcium 9.3 8.3 - 10.6 mg/dL    Total Protein 7.1 6.4 - 8.2 g/dL    Albumin 4.1 3.4 - 5.0 g/dL    Albumin/Globulin Ratio 1.4 1.1 - 2.2    Total Bilirubin <0.2 0.0 - 1.0 mg/dL    Alkaline Phosphatase 65 40 - 129 U/L    ALT 14 10 - 40 U/L    AST 19 15 - 37 U/L   CBC with Auto Differential   Result Value Ref Range    WBC 8.6 4.0 - 11.0 K/uL    RBC 1.97 (L) 4.00 - 5.20 M/uL    Hemoglobin 6.3 (LL) 12.0 - 16.0 g/dL    Hematocrit 19.0 (LL) 36.0 - 48.0 %    MCV 96.4 80.0 - 100.0 fL    MCH 32.0 26.0 - 34.0 pg    MCHC 33.2 31.0 - 36.0 g/dL    RDW 15.2 12.4 - 15.4 %    Platelets 491 559 - 950 K/uL    MPV 7.3 5.0 - 10.5 fL    Neutrophils % 69.9 %    Lymphocytes % 19.8 %    Monocytes % 8.1 %    Eosinophils % 1.8 %    Basophils % 0.4 %    Neutrophils Absolute 6.0 1.7 - 7.7 K/uL    Lymphocytes Absolute 1.7 1.0 - 5.1 K/uL    Monocytes Absolute 0.7 0.0 - 1.3 K/uL Eosinophils Absolute 0.2 0.0 - 0.6 K/uL    Basophils Absolute 0.0 0.0 - 0.2 K/uL   Troponin   Result Value Ref Range    Troponin 0.05 (H) <0.01 ng/mL   TYPE AND SCREEN   Result Value Ref Range    ABO/Rh O POS     Antibody Screen NEG        RECENT VITALS:  BP: 130/63, Temp: 98.3 °F (36.8 °C), Heart Rate: 84, Resp: 19, SpO2: 100 %     Procedures         ED Course     The patient was given the following medications:  Orders Placed This Encounter   Medications    0.9 % sodium chloride infusion    pantoprazole (PROTONIX) injection 40 mg    sodium chloride flush 0.9 % injection 5-40 mL    sodium chloride flush 0.9 % injection 5-40 mL    0.9 % sodium chloride infusion    OR Linked Order Group     ondansetron (ZOFRAN-ODT) disintegrating tablet 4 mg     ondansetron (ZOFRAN) injection 4 mg    OR Linked Order Group     acetaminophen (TYLENOL) tablet 650 mg     acetaminophen (TYLENOL) suppository 650 mg    0.9 % sodium chloride infusion    FOLLOWED BY Linked Order Group     pantoprazole (PROTONIX) 80 mg in sodium chloride (PF) 0.9 % 20 mL injection     pantoprazole (PROTONIX) 40 mg in sodium chloride (PF) 0.9 % 10 mL injection    0.9 % sodium chloride bolus       CONSULTS:  IP CONSULT TO GI  IP CONSULT TO HOSPITALIST  IP CONSULT TO GI    MEDICAL DECISION MAKING / ASSESSMENT / Nehemiah Luly is a 67 y.o. female who presents with symptoms concerning for acute gastrointestinal hemorrhage. She is on Brilinta but has not had stents placed for several years. She does appear pale and tired. Is found to be acutely anemic with a hemoglobin of 6.3 down from a baseline of 10. Was initially very mildly tachycardic at triage but this has resolved with no intervention. Mild acidosis seen on her renal panel and mild CHRISTINE on CKD. She is given a 500 cc bolus of normal saline but also be ordered for 1 unit of packed red blood cells for her acute anemia. Low suspicion that she is in hemorrhagic shock at this point.   I have discussed the case with gastroenterology who recommended keeping her n.p.o., holding her Brilinta, and they will evaluate her for endoscopy in the morning. I also discussed this case with the hospitalist who agreed to admit her for further care. Due to the immediate potential for life-threatening deterioration due to acute gastrointestinal hemorrhage, I spent 37 minutes providing critical care. This time excludes time spent performing procedures but includes time spent on direct patient care, history retrieval, review of the chart, and discussions with patient, family, and consultant(s). Clinical Impression     1. Acute gastrointestinal hemorrhage        Disposition     PATIENT REFERRED TO:  No follow-up provider specified.     DISCHARGE MEDICATIONS:  New Prescriptions    No medications on file       DISPOSITION Admitted 11/18/2022 02:21:01 AM           Valerie Barnett MD  11/18/22 2235

## 2022-11-18 NOTE — PROCEDURES
EGD REPORT    Patient:  Sheridan Pollack                  1950    Endoscopist: STEF Hollis     Indication:  GI bleed     Medications:  MAC    Pre-Anesthesia Assessment:  I have reviewed and am in agreement with patient history and medication, including previous response to sedation. Prior to the procedure, a History and Physical was performed, and patient medications and allergies were reviewed. The patient is competent. The risks and benefits of the procedure and the sedation options and risks were discussed with the patient. Risks discussed included but were not limited to infection, bleeding, perforation, death, and missed lesions. All questions were answered and informed consent was obtained. Patient identification and proposed procedure were verified by the physician and the nurse in the pre-procedure area in the procedure room. [place]: 3  ASA Grade Assessment: 3     After reviewing the risks and benefits, the patient was deemed in satisfactory condition to undergo the procedure. The anesthesia plan was to use MAC anesthesia. Immediately prior to administration of medications, the patient was re-assessed for adequacy to receive sedatives and a time out was performed. Patient and healthcare providers were in agreement it was the correct patient and procedure. The heart rate, respiratory rate, oxygen saturations, blood pressure, adequacy of pulmonary ventilation, and response to care were monitored throughout the procedure. The physical status of the patient was re-assessed after the procedure. After obtaining informed consent, the endoscope was passed under direct vision. The endoscope was introduced through the mouth, and advanced to the second part of duodenum. The EGD was accomplished without difficulty. The patient tolerated the procedure well.      Duodenum: Normal  Stomach: Atrophic nodular gastritis more in proximal region, biopsies done throughout  Retroflexion did not show a hiatal hernia. Esophagus:  No evidence of Durán's or esophagitis.  Non obstructin mild ring above Z line but not involving     No blood in UGI tract    Estimated blood loss trace    Plan:  Daily PPI  Colonoscopy tomorrow

## 2022-11-18 NOTE — CONSULTS
[address] [facility]  GI Consultation                                                                 Patient: Joshua Gibbs  : 1950       Date:  2022    Subjective:       History of Present Illness  Patient is a 67 y.o.  female admitted with Acute gastrointestinal hemorrhage [K92.2]  GI bleed [K92.2] who is seen in consult for GI bleed over last week initially had black stools, yesterday had BRB per rectum also. Felt tired with PHILIP, no syncope. No abdominal pain Nausea/emesis. Mild heartburn. Katie Mercado yesterday. Past Medical History:   Diagnosis Date    CAD (coronary artery disease)     CAD (coronary artery disease) 10/20/2016    Distal RCA 3.0 x 15 mm Alpine CRISTEL    Diabetes mellitus ([address]     Essential hypertension     Gout     Hyperlipidemia       Past Surgical History:   Procedure Laterality Date    ANGIOPLASTY      GS    APPENDECTOMY      BREAST BIOPSY       SECTION      CHOLECYSTECTOMY      CT BIOPSY RENAL  2021    CT BIOPSY RENAL 2021 [address] SCAN    HERNIA REPAIR        Past Endoscopic History colonoscopy 8 years ago    Admission Meds  No current facility-administered medications on file prior to encounter. Current Outpatient Medications on File Prior to Encounter   Medication Sig Dispense Refill    Evolocumab (REPATHA SURECLICK) 851 MG/ML SOAJ INJECT CONTENTS OF 1 PEN SUBCUTANEOUSLY EVERY 14 DAYS 6 pen 3    allopurinol (ZYLOPRIM) 100 MG tablet       sodium bicarbonate 650 MG tablet TAKE 1 TABLET (650 MG TOTAL) BY MOUTH 3 TIMES DAILY. sodium zirconium cyclosilicate (LOKELMA) 10 g PACK oral suspension Take 10 g by mouth Every Monday, Wednesday, and Friday      lidocaine (LIDODERM) 5 % Place 1 patch onto the skin daily 12 hours on, 12 hours off.  (Patient not taking: Reported on 2022) 30 patch 0    cyclobenzaprine (FLEXERIL) 10 MG tablet Take 1 tablet by mouth 3 times daily as needed for Muscle spasms (Patient not taking: Reported on 11/18/2022) 15 tablet 0    ticagrelor (BRILINTA) 60 MG TABS tablet TAKE 1 TABLET TWICE A  tablet 3    aspirin 81 MG EC tablet Take 81 mg by mouth daily      glipiZIDE (GLUCOTROL XL) 2.5 MG extended release tablet Take 2.5 mg by mouth 2 times daily (with meals)      metoprolol tartrate (LOPRESSOR) 25 MG tablet Take 25 mg by mouth 2 times daily      hydrALAZINE (APRESOLINE) 50 MG tablet Take 50 mg by mouth 2 times daily      sertraline (ZOLOFT) 50 MG tablet Take 50 mg by mouth daily      vitamin D (CHOLECALCIFEROL) 25 MCG (1000 UT) TABS tablet Take 1,000 Units by mouth daily          Allergies  Allergies   Allergen Reactions    Crestor [Rosuvastatin Calcium]     Statins       Social   Social History     Tobacco Use    Smoking status: Never    Smokeless tobacco: Never   Substance Use Topics    Alcohol use: No        No family history on file.      Review of Systems  A comprehensive review of systems was negative except for: Constitutional: positive for fatigue, PHILIP       Physical Exam    /75   Pulse 81   Temp 98.1 °F (36.7 °C) (Oral)   Resp 17   Ht 5' (1.524 m)   Wt 223 lb (101.2 kg)   SpO2 99%   BMI 43.55 kg/m²   General appearance: alert, cooperative, no distress, appears stated age  Anicteric, No Jaundice  Head: Normocephalic, without obvious abnormality  Lungs: clear to auscultation bilaterally, Normal Effort  Heart: regular rate and rhythm, normal S1 and S2, no murmurs or rubs  Abdomen: soft, non-tender; bowel sounds normal; no masses,  no organomegaly  Extremities: atraumatic, no cyanosis or edema  Skin: warm and dry  Neuro: intact  AAOX3    Data Review:    Recent Labs     11/18/22  0001   WBC 8.6   HGB 6.3*   HCT 19.0*   MCV 96.4        Recent Labs     11/18/22  0001      K 4.7      CO2 19*   BUN 66*   CREATININE 3.7*     Recent Labs     11/18/22  0001   AST 19   ALT 14   BILITOT <0.2   ALKPHOS 65               Assessment:     Principal Problem:    GI bleed  Resolved Problems:    * No resolved hospital problems. *    Melena  Hematochezia  Blood loss anemia    Unclear if upper or lower GI bleed, CKD precludes using BUN as an indicator, initially sounded more upper. Consider ulcer AVM's neoplasm. Brilinta effect is still on board. Recommendations:     Transfuse to Hg 8  PPI  EGD today    Thank you for the opportunity to participate in Denver Boland's care.     Raymond Lennox, MD

## 2022-11-18 NOTE — H&P
Hospital Medicine History & Physical      PCP: Jeff Stewart MD    Date of Admission: 2022    Chief Complaint: SOB, black stool, BRBPR      History Of Present Illness:  Patient is a 72-year-old female with past medical history of diabetes mellitus, hypertension who presents to the hospital for black tarry stool, blood per rectum for last one week. She went to her pcp and was told to stop asa, when bleeding stopped for a day, she resumed her asa and bleeding returned on Monday. She also complained of shortness of breath with usual activities. ED workup showed, low hb of 6.8. Past Medical History:          Diagnosis Date    CAD (coronary artery disease)     CAD (coronary artery disease) 10/20/2016    Distal RCA 3.0 x 15 mm Alpine CRISTEL    Diabetes mellitus ([address]     Essential hypertension     Gout     Hyperlipidemia        Past Surgical History:          Procedure Laterality Date    ANGIOPLASTY      GS    APPENDECTOMY      BREAST BIOPSY       SECTION      CHOLECYSTECTOMY      CT BIOPSY RENAL  2021    CT BIOPSY RENAL 2021 [facility] CT SCAN    HERNIA REPAIR         Medications Prior to Admission:      Prior to Admission medications    Medication Sig Start Date End Date Taking? Authorizing Provider   Evolocumab (REPATHA SURECLICK) 653 MG/ML SOAJ INJECT CONTENTS OF 1 PEN SUBCUTANEOUSLY EVERY 14 DAYS 22   ALETA Killian CNP   allopurinol (ZYLOPRIM) 100 MG tablet  22   Historical Provider, MD   sodium bicarbonate 650 MG tablet TAKE 1 TABLET (650 MG TOTAL) BY MOUTH 3 TIMES DAILY. 22   Historical Provider, MD   sodium zirconium cyclosilicate (LOKELMA) 10 g PACK oral suspension Take 10 g by mouth Every Monday, Wednesday, and 21   Historical Provider, MD   lidocaine (LIDODERM) 5 % Place 1 patch onto the skin daily 12 hours on, 12 hours off.   Patient not taking: Reported on 2022   ALETA Almanzar CNP   cyclobenzaprine (FLEXERIL) 10 MG tablet Take 1 tablet by mouth 3 times daily as needed for Muscle spasms  Patient not taking: Reported on 11/18/2022 2/4/22   Zay Tomi, APRN - CNP   ticagrelor (BRILINTA) 60 MG TABS tablet TAKE 1 TABLET TWICE A DAY 2/1/22   Dada Escobedo MD   aspirin 81 MG EC tablet Take 81 mg by mouth daily    Historical Provider, MD   glipiZIDE (GLUCOTROL XL) 2.5 MG extended release tablet Take 2.5 mg by mouth 2 times daily (with meals)    Historical Provider, MD   metoprolol tartrate (LOPRESSOR) 25 MG tablet Take 25 mg by mouth 2 times daily    Historical Provider, MD   hydrALAZINE (APRESOLINE) 50 MG tablet Take 50 mg by mouth 2 times daily    Historical Provider, MD   sertraline (ZOLOFT) 50 MG tablet Take 50 mg by mouth daily    Historical Provider, MD   vitamin D (CHOLECALCIFEROL) 25 MCG (1000 UT) TABS tablet Take 1,000 Units by mouth daily    Historical Provider, MD       Allergies:  Crestor [rosuvastatin calcium] and Statins    Social History:      TOBACCO:   reports that she has never smoked. She has never used smokeless tobacco.  ETOH:   reports no history of alcohol use. Family History:       Reviewed in detail and non contributory      No family history on file. REVIEW OF SYSTEMS:   Pertinent positives as noted in the HPI. All other systems reviewed and negative. PHYSICAL EXAM PERFORMED:    BP (!) 142/70   Pulse 82   Temp 97.9 °F (36.6 °C) (Oral)   Resp 16   Ht 5' (1.524 m)   Wt 223 lb (101.2 kg)   SpO2 99%   BMI 43.55 kg/m²     General appearance:  No apparent distress, cooperative. HEENT:  Normal cephalic, atraumatic without obvious deformity. Conjunctivae/corneas clear. Neck: Supple, with full range of motion. No cervical lymphadenopathy  Respiratory:  Normal respiratory effort. Clear to auscultation, bilaterally without Rales/Wheezes/Rhonchi. Cardiovascular:  Regular rate and rhythm with normal S1/S2 without murmurs, rubs or gallops.   Abdomen: Soft, non-tender, non-distended, normal bowel sounds. Musculoskeletal:  No edema noted bilaterally. No tenderness on palpation   Skin: no rash visible  Neurologic:  Neurologically intact without any focal sensory/motor deficits. grossly non-focal.  Psychiatric:  Alert and oriented, normal mood  Peripheral Pulses: +2 palpable, equal bilaterally       Labs:     Recent Labs     11/18/22  0001 11/18/22  0827   WBC 8.6  --    HGB 6.3* 7.3*   HCT 19.0* 22.9*     --      Recent Labs     11/18/22  0001      K 4.7      CO2 19*   BUN 66*   CREATININE 3.7*   CALCIUM 9.3     Recent Labs     11/18/22  0001   AST 19   ALT 14   BILITOT <0.2   ALKPHOS 65     No results for input(s): INR in the last 72 hours. Recent Labs     11/18/22  0001   TROPONINI 0.05*       Urinalysis:      Lab Results   Component Value Date/Time    NITRU POSITIVE 02/04/2022 10:53 AM    WBCUA 10-20 02/04/2022 10:53 AM    BACTERIA 3+ 02/04/2022 10:53 AM    RBCUA 0-2 02/04/2022 10:53 AM    BLOODU Negative 02/04/2022 10:53 AM    SPECGRAV 1.020 02/04/2022 10:53 AM    GLUCOSEU Negative 02/04/2022 10:53 AM       Radiology:       XR CHEST (2 VW)   Final Result      No acute cardiopulmonary disease              Active Hospital Problems    Diagnosis Date Noted    GI bleed [K92.2] 11/18/2022     Priority: Medium       Assessment  Upper GI bleed  Lower GI bleed  Shortness of breath  Acute anemia  Hypertension  CAD s/p stenting 2016  Diabetes mellitus    Plan  Holding blood thinners  Transfuse for hb < 8  Consult GI  Avoid NSAIDs  S/p EGD today  Protonix  Telemetry    DVT prophylaxis :- scds  Diet: Diet NPO  Code Status: Full Code    PT/OT Eval Status: ordered    Dispo - pending clinical improvement       Tiffany Kamara MD    The note was completed using EMR and Dragon dictation system. Every effort was made to ensure accuracy; however, inadvertent computerized transcription errors may be present. Thank you Katerin Brown MD for the opportunity to be involved in this patient's care.  If you have any questions or concerns please feel free to contact me at [phone].     Peri Mcdaniel MD

## 2022-11-18 NOTE — ED PROVIDER NOTES
[address] [facility] [address] ENCOUNTER          NURSE PRACTITIONER NOTE       Date of evaluation: 11/17/2022    Chief Complaint     Shortness of Breath (/) and Rectal Bleeding    History of Present Illness     Brandi Roque is a 67 y.o. female who presents for breath and rectal bleeding. Patient states her symptoms began last Tuesday evening with rectal bleeding. She states she noticed her stool was particularly dark and black appearing. She also noted some dark red blood in the toilet with her bowel movements. She was acutely concerned and saw her PCP on Wednesday for the symptoms. Her PCP recommended she stop her aspirin and follow-up with an outpatient colonoscopy. Of note, patient has cardiac stents and is on Brilinta and aspirin. Patient stopped taking her aspirin Wednesday through Sunday. Reported some improvement in her dark/loose stools and rectal bleeding during this time. However, on Monday she resumed her aspirin. Today she noted a return in her symptoms, however, she noted brighter red blood in the toilet today. She is also complaining of shortness of breath with exertion. She states over the last week, she has noticed that she is getting short of breath with exertion which is somewhat new for her. She is concerned she may be anemic. She denies any pain associated with bowel movements. She reports looser stool than normal with several episodes a day over the last week. However, she denies any associated abdominal pain or cramping. She also denies nausea or vomiting. No fevers or infectious symptoms. Patient is concerned that    Review of Systems     Review of systems negative with exception of those noted in the HPI    Past Medical, Surgical, Family, and Social History     She has a past medical history of CAD (coronary artery disease), CAD (coronary artery disease), Diabetes mellitus ([facility] [address] Essential hypertension, Gout, and Hyperlipidemia.   She has a past surgical history that includes angioplasty (); Cholecystectomy; Appendectomy;  section; Breast biopsy; hernia repair; and CT BIOPSY RENAL (2021). Her family history is not on file. She reports that she has never smoked. She has never used smokeless tobacco. She reports that she does not drink alcohol and does not use drugs. Medications     Previous Medications    ALLOPURINOL (ZYLOPRIM) 100 MG TABLET        ASPIRIN 81 MG EC TABLET    Take 81 mg by mouth daily    CYCLOBENZAPRINE (FLEXERIL) 10 MG TABLET    Take 1 tablet by mouth 3 times daily as needed for Muscle spasms    EVOLOCUMAB (REPATHA SURECLICK) 697 MG/ML SOAJ    INJECT CONTENTS OF 1 PEN SUBCUTANEOUSLY EVERY 14 DAYS    GLIPIZIDE (GLUCOTROL XL) 2.5 MG EXTENDED RELEASE TABLET    Take 2.5 mg by mouth 2 times daily (with meals)    HYDRALAZINE (APRESOLINE) 50 MG TABLET    Take 50 mg by mouth 2 times daily    LIDOCAINE (LIDODERM) 5 %    Place 1 patch onto the skin daily 12 hours on, 12 hours off. METOPROLOL TARTRATE (LOPRESSOR) 25 MG TABLET    Take 25 mg by mouth 2 times daily    SERTRALINE (ZOLOFT) 50 MG TABLET    Take 50 mg by mouth daily    SODIUM BICARBONATE 650 MG TABLET    TAKE 1 TABLET (650 MG TOTAL) BY MOUTH 3 TIMES DAILY. SODIUM ZIRCONIUM CYCLOSILICATE (LOKELMA) 10 G PACK ORAL SUSPENSION    Take 10 g by mouth Every Monday, Wednesday, and Friday    TICAGRELOR (BRILINTA) 60 MG TABS TABLET    TAKE 1 TABLET TWICE A DAY    VITAMIN D (CHOLECALCIFEROL) 25 MCG (1000 UT) TABS TABLET    Take 1,000 Units by mouth daily       Allergies     She is allergic to crestor [rosuvastatin calcium] and statins. Physical Exam     INITIAL VITALS: BP: (!) 147/67, Temp: 98.3 °F (36.8 °C), Heart Rate: (!) 101, Resp: 16,       General: 67 y.o.  female in no apparent distress, well appearing, non-toxic. HEENT: Atraumatic, normocephalic. EOMs intact. Neck:  Full range of motion.      Chest/pulm: Respiratory rate normal. Speaks in complete sentences, no respiratory distress at rest.  Lungs CTA bilaterally. Cardiovascular: S1, S2 no tachycardia noted on exam.  No M/R/G    Abdomen: No gross distension. Soft, completely non-tender to palpation. Non-peritoneal.  No suprapubic tenderness. Rectal exam: Performed at bedside with RN. Patient has 2 external hemorrhoids but they are not edematous, erythematous, or bleeding. No palpable internal hemorrhoids noted. No melena but possible evidence of red blood noted. Good rectal tone. : Deferred. Musculoskeletal: No obvious joint deformity. Ambulates without difficulty. Neuro: A&O x 4. Normal speech without dysarthria or aphasia. Moves all extremities spontaneously and symmetrically. Gait normal without ataxia. Skin: Warm, dry. No obvious rashes, petechiae, or purpura. Psych: Appropriate mood and affect, normal interaction. Diagnostic Results     EKG   Interpreted in conjunction with emergency department physician No att. providers found  Rhythm: normal sinus   Rate: normal  Axis: normal  Ectopy: none  : normal  ST Segments: no acute change  T Waves: no acute change  Q Waves: none  Clinical Impression: no acute changes  Comparison: Normal sinus rhythm without evidence of acute ischemia. In comparison to prior EKG no significant changes that I appreciate. Some artifact noted. RADIOLOGY:  No orders to display     LABS:   No results found for this visit on 11/17/22. ED BEDSIDE ULTRASOUND:  No results found. RECENT VITALS:  BP: (!) 147/67, Temp: 98.3 °F (36.8 °C), Heart Rate: (!) 101, Resp: 16,       Procedures     N/a    ED Course     Nursing Notes, Past Medical Hx, Past Surgical Hx, Social Hx, Allergies, and Family Hx were reviewed. The patient was given the following medications:  No orders of the defined types were placed in this encounter.     CONSULTS:  None    MEDICAL DECISION MAKING / ASSESSMENT / PLAN     Pooja Schuster is a 67 y.o. female who presents emergency department with complaints of shortness of breath and rectal bleeding. On presentation, patient is not in any acute distress and is satting well on room air. Physical exam notable for evidence of melena on BAKARI. However, her abdomen is completely nontender to palpation. Initial laboratory studies notable for hemoglobin drop of 6.3. At this time, type and screen and prepare and transfuse 1 unit PRBCs was ordered. Risk and benefits of blood transfusion discussed with the patient and she consented to receiving blood. At this point in time, I will be signing out the care of the patient to my colleague Dr. Jennifer Ross. At the time of signout, following is pending:  -Remainder of laboratory studies   -Repeat troponin   -Inpatient GI consult  -Reevaluation and appropriate disposition    This patient was also evaluated by the attending physician. All care plans werediscussed and agreed upon.        [place], [place] - CNP  11/18/22 Marco Dowd [id number], [place] - CNP  11/18/22 0934

## 2022-11-18 NOTE — ANESTHESIA POSTPROCEDURE EVALUATION
Department of Anesthesiology  Postprocedure Note    Patient: Amena Gtz  MRN: [id number]  YOB: 1950  Date of evaluation: 11/18/2022      Procedure Summary     Date: 11/18/22 Room / Location: [address] [place] [address] Sriram oR 01 / [facility]    Anesthesia Start: 0936 Anesthesia Stop: 1134    Procedure: EGD BIOPSY Diagnosis:       Acute GI bleeding      (gi bleed)    Surgeons: Lucas Galindo MD Responsible Provider: Bear Jiménez DO    Anesthesia Type: MAC ASA Status: 4          Anesthesia Type: No value filed. Richard Phase I: Richard Score: 10    Richard Phase II: Richard Score: 8      Anesthesia Post Evaluation    Patient location during evaluation: PACU  Patient participation: complete - patient participated  Level of consciousness: awake  Pain score: 0  Airway patency: patent  Nausea & Vomiting: no nausea and no vomiting  Complications: no  Cardiovascular status: blood pressure returned to baseline  Respiratory status: acceptable  Hydration status: euvolemic  There was medical reason for not using a multimodal analgesia pain management approach.

## 2022-11-18 NOTE — ANESTHESIA PRE PROCEDURE
Department of Anesthesiology  Preprocedure Note       Name:  Cher Kincaid   Age:  67 y.o.  :  1950                                          MRN:  [id number]         Date:  2022      Surgeon: Domenico Chang):  Gordon Philip MD    Procedure: Procedure(s):  EGD ESOPHAGOGASTRODUODENOSCOPY    Medications prior to admission:   Prior to Admission medications    Medication Sig Start Date End Date Taking? Authorizing Provider   Evolocumab (REPATHA SURECLICK) 798 MG/ML SOAJ INJECT CONTENTS OF 1 PEN SUBCUTANEOUSLY EVERY 14 DAYS 22   ALETA Young CNP   allopurinol (ZYLOPRIM) 100 MG tablet  22   Historical Provider, MD   sodium bicarbonate 650 MG tablet TAKE 1 TABLET (650 MG TOTAL) BY MOUTH 3 TIMES DAILY. 22   Historical Provider, MD   sodium zirconium cyclosilicate (LOKELMA) 10 g PACK oral suspension Take 10 g by mouth Every Monday, Wednesday, and 21   Historical Provider, MD   lidocaine (LIDODERM) 5 % Place 1 patch onto the skin daily 12 hours on, 12 hours off.   Patient not taking: Reported on 2022   ALETA Mejia CNP   cyclobenzaprine (FLEXERIL) 10 MG tablet Take 1 tablet by mouth 3 times daily as needed for Muscle spasms  Patient not taking: Reported on 2022   ALETA Mejia CNP   ticagrelor (BRILINTA) 60 MG TABS tablet TAKE 1 TABLET TWICE A DAY 22   Peter Troy MD   aspirin 81 MG EC tablet Take 81 mg by mouth daily    Historical Provider, MD   glipiZIDE (GLUCOTROL XL) 2.5 MG extended release tablet Take 2.5 mg by mouth 2 times daily (with meals)    Historical Provider, MD   metoprolol tartrate (LOPRESSOR) 25 MG tablet Take 25 mg by mouth 2 times daily    Historical Provider, MD   hydrALAZINE (APRESOLINE) 50 MG tablet Take 50 mg by mouth 2 times daily    Historical Provider, MD   sertraline (ZOLOFT) 50 MG tablet Take 50 mg by mouth daily    Historical Provider, MD   vitamin D (CHOLECALCIFEROL) 25 MCG (1000 UT) TABS tablet Take 1,000 Units by mouth daily    Historical Provider, MD       Current medications:    Current Facility-Administered Medications   Medication Dose Route Frequency Provider Last Rate Last Admin    0.9 % sodium chloride infusion   IntraVENous PRN Belen Horton, APRN - CNP        sodium chloride flush 0.9 % injection 5-40 mL  5-40 mL IntraVENous 2 times per day Madan Bailey MD   10 mL at 11/18/22 4089    sodium chloride flush 0.9 % injection 5-40 mL  5-40 mL IntraVENous PRN Madan Bailey MD        0.9 % sodium chloride infusion   IntraVENous PRN Madan Bailey MD        ondansetron (ZOFRAN-ODT) disintegrating tablet 4 mg  4 mg Oral Q8H PRN Madan Bailey MD        Or    ondansetron Neto Kida) injection 4 mg  4 mg IntraVENous Q6H PRN Madan Bailey MD        acetaminophen (TYLENOL) tablet 650 mg  650 mg Oral Q6H PRN Madan Bailey MD        Or   Ritta Bring acetaminophen (TYLENOL) suppository 650 mg  650 mg Rectal Q6H PRN Madan Bailey MD        0.9 % sodium chloride infusion   IntraVENous Continuous Madan Bailey  mL/hr at 11/18/22 0503 New Bag at 11/18/22 0503    [START ON 11/19/2022] pantoprazole (PROTONIX) 40 mg in sodium chloride (PF) 0.9 % 10 mL injection  40 mg IntraVENous Q24H Colt Ponce MD           Allergies:     Allergies   Allergen Reactions    Crestor [Rosuvastatin Calcium]     Statins        Problem List:    Patient Active Problem List   Diagnosis Code    Coronary angioplasty status Z98.61    Essential hypertension I10    Pure hypercholesterolemia E78.00    Gout M10.9    Type 2 diabetes mellitus without complication ([facility] [address] T58.3    Angina pectoris ([facility] [address] I20.9    S/P coronary angiogram Z98.890    Acute pancreatitis without infection or necrosis K85.90    CAD (coronary artery disease) I25.10    Morbid obesity with BMI of 40.0-44.9, adult ([facility] [address] E66.01, Z68.41    COVID-19 U07.1    GI bleed K92.2       Past Medical History: Diagnosis Date    CAD (coronary artery disease)     CAD (coronary artery disease) 10/20/2016    Distal RCA 3.0 x 15 mm [employer] CRISTEL    Diabetes mellitus ([address]     Essential hypertension     Gout     Hyperlipidemia        Past Surgical History:        Procedure Laterality Date    ANGIOPLASTY      GS    APPENDECTOMY      BREAST BIOPSY       SECTION      CHOLECYSTECTOMY      CT BIOPSY RENAL  2021    CT BIOPSY RENAL 2021 [facility] CT SCAN    HERNIA REPAIR         Social History:    Social History     Tobacco Use    Smoking status: Never    Smokeless tobacco: Never   Substance Use Topics    Alcohol use: No                                Counseling given: Not Answered      Vital Signs (Current):   Vitals:    22 0644 22 0832 22 1131 22 1227   BP: 135/75 125/69 124/73 134/74   Pulse: 81 75 82 80   Resp: 17 16 16 16   Temp: 98.1 °F (36.7 °C) 97.9 °F (36.6 °C) 98.2 °F (36.8 °C) 98.4 °F (36.9 °C)   TempSrc: Oral Oral Oral Temporal   SpO2: 99% 100% 99% 98%   Weight:       Height:                                                  BP Readings from Last 3 Encounters:   22 134/74   22 120/72   22 (!) 203/89       NPO Status:                                                                                 BMI:   Wt Readings from Last 3 Encounters:   22 223 lb (101.2 kg)   22 219 lb (99.3 kg)   22 222 lb (100.7 kg)     Body mass index is 43.55 kg/m².     CBC:   Lab Results   Component Value Date/Time    WBC 8.6 2022 12:01 AM    RBC 1.97 2022 12:01 AM    HGB 7.3 2022 08:27 AM    HCT 22.9 2022 08:27 AM    MCV 96.4 2022 12:01 AM    RDW 15.2 2022 12:01 AM     2022 12:01 AM       CMP:   Lab Results   Component Value Date/Time     2022 12:01 AM    K 4.7 2022 12:01 AM     2022 12:01 AM    CO2 19 2022 12:01 AM    BUN 66 2022 12:01 AM    CREATININE 3.7 2022 12:01 AM    GFRAA 18 02/17/2022 10:19 AM    AGRATIO 1.4 11/18/2022 12:01 AM    LABGLOM 12 11/18/2022 12:01 AM    GLUCOSE 107 11/18/2022 12:01 AM    PROT 7.1 11/18/2022 12:01 AM    CALCIUM 9.3 11/18/2022 12:01 AM    BILITOT <0.2 11/18/2022 12:01 AM    ALKPHOS 65 11/18/2022 12:01 AM    AST 19 11/18/2022 12:01 AM    ALT 14 11/18/2022 12:01 AM       POC Tests: No results for input(s): POCGLU, POCNA, POCK, POCCL, POCBUN, POCHEMO, POCHCT in the last 72 hours. Coags:   Lab Results   Component Value Date/Time    PROTIME 11.3 06/17/2021 09:10 AM    INR 0.97 06/17/2021 09:10 AM    APTT 33.4 06/17/2021 09:10 AM       HCG (If Applicable): No results found for: PREGTESTUR, PREGSERUM, HCG, HCGQUANT     ABGs: No results found for: PHART, PO2ART, BVK5XVL, PEQ9AKY, BEART, Z0ZGUJMR     Type & Screen (If Applicable):  No results found for: LABABO, LABRH    Drug/Infectious Status (If Applicable):  No results found for: HIV, HEPCAB    COVID-19 Screening (If Applicable):   Lab Results   Component Value Date/Time    COVID19 Positive 12/16/2020 08:53 PM    COVID19 DETECTED 11/19/2020 11:48 AM           Anesthesia Evaluation  Nursing notes reviewed no history of anesthetic complications:   Airway: Mallampati: IV  TM distance: >3 FB   Neck ROM: full  Mouth opening: > = 3 FB   Dental: normal exam         Pulmonary:normal exam                               Cardiovascular:  Exercise tolerance: no interval change,   (+) hypertension:, angina: no interval change, CAD:, dysrhythmias:,       ECG reviewed  Rhythm: regular  Rate: normal  Echocardiogram reviewed         Beta Blocker:  Dose within 24 Hrs         Neuro/Psych:               GI/Hepatic/Renal:   (+) morbid obesity          Endo/Other:    (+) DiabetesType II DM, , blood dyscrasia: anticoagulation therapy:., .                 Abdominal:   (+) obese,     Abdomen: soft. Vascular:           Other Findings:           Anesthesia Plan      MAC     ASA 4       Induction: intravenous. Anesthetic plan and risks discussed with patient. Plan discussed with CRNA.                     Mulu Artis DO   11/18/2022

## 2022-11-18 NOTE — PROGRESS NOTES
05: 14-Messaged the hospitalist to place in blood transfusion consent note but said to defer it for the day team.

## 2022-11-18 NOTE — PLAN OF CARE
Problem: Chronic Conditions and Co-morbidities  Goal: Patient's chronic conditions and co-morbidity symptoms are monitored and maintained or improved  Outcome: Progressing  Flowsheets (Taken 11/18/2022 0639)  Care Plan - Patient's Chronic Conditions and Co-Morbidity Symptoms are Monitored and Maintained or Improved:   Monitor and assess patient's chronic conditions and comorbid symptoms for stability, deterioration, or improvement   Collaborate with multidisciplinary team to address chronic and comorbid conditions and prevent exacerbation or deterioration   Update acute care plan with appropriate goals if chronic or comorbid symptoms are exacerbated and prevent overall improvement and discharge  Note: Ongoing blood transfusion. No allergies/ reactions noted.  Will monitor     Problem: Discharge Planning  Goal: Discharge to home or other facility with appropriate resources  Flowsheets (Taken 11/18/2022 3244)  Discharge to home or other facility with appropriate resources:   Identify barriers to discharge with patient and caregiver   Arrange for needed discharge resources and transportation as appropriate   Identify discharge learning needs (meds, wound care, etc)   Refer to discharge planning if patient needs post-hospital services based on physician order or complex needs related to functional status, cognitive ability or social support system  Note: For GI evaluation today

## 2022-11-18 NOTE — PROGRESS NOTES
4 Eyes Admission Assessment     I agree as the admission nurse that 2 RN's have performed a thorough Head to Toe Skin Assessment on the patient. ALL assessment sites listed below have been assessed on admission. Areas assessed by both nurses: Kathie Little  [x]   Head, Face, and Ears   [x]   Shoulders, Back, and Chest  [x]   Arms, Elbows, and Hands   [x]   Coccyx, Sacrum, and Ischium  [x]   Legs, Feet, and Heels        Does the Patient have Skin Breakdown?   No         Jimmy Prevention initiated:  No   Wound Care Orders initiated:  No      [facility] nurse consulted for Pressure Injury (Stage 3,4, Unstageable, DTI, NWPT, and Complex wounds) or Jimmy score 18 or lower:  No      Nurse 1 eSignature: Electronically signed by Davey Rodríguez RN on 11/18/22 at 5:04 AM EST    **SHARE this note so that the co-signing nurse is able to place an eSignature**    Nurse 2 eSignature: Electronically signed by Nayan Jones RN on 11/18/22 at 3:28 AM EST

## 2022-11-19 ENCOUNTER — ANESTHESIA EVENT (OUTPATIENT)
Dept: ENDOSCOPY | Age: 72
DRG: 394 | End: 2022-11-19
Payer: MEDICARE

## 2022-11-19 ENCOUNTER — ANESTHESIA (OUTPATIENT)
Dept: ENDOSCOPY | Age: 72
DRG: 394 | End: 2022-11-19
Payer: MEDICARE

## 2022-11-19 ENCOUNTER — APPOINTMENT (OUTPATIENT)
Dept: CT IMAGING | Age: 72
DRG: 394 | End: 2022-11-19
Payer: MEDICARE

## 2022-11-19 LAB
ANION GAP SERPL CALCULATED.3IONS-SCNC: 13 MMOL/L (ref 3–16)
APTT: 29.2 SEC (ref 23–34.3)
BUN BLDV-MCNC: 55 MG/DL (ref 7–20)
CALCIUM SERPL-MCNC: 9.5 MG/DL (ref 8.3–10.6)
CHLORIDE BLD-SCNC: 109 MMOL/L (ref 99–110)
CO2: 19 MMOL/L (ref 21–32)
CREAT SERPL-MCNC: 3.6 MG/DL (ref 0.6–1.2)
GFR SERPL CREATININE-BSD FRML MDRD: 13 ML/MIN/{1.73_M2}
GLUCOSE BLD-MCNC: 101 MG/DL (ref 70–99)
HCT VFR BLD CALC: 22.1 % (ref 36–48)
HCT VFR BLD CALC: 23.1 % (ref 36–48)
HCT VFR BLD CALC: 23.2 % (ref 36–48)
HCT VFR BLD CALC: 24.5 % (ref 36–48)
HEMOGLOBIN: 7.6 G/DL (ref 12–16)
HEMOGLOBIN: 7.8 G/DL (ref 12–16)
HEMOGLOBIN: 7.9 G/DL (ref 12–16)
HEMOGLOBIN: 8.2 G/DL (ref 12–16)
INR BLD: 1.08 (ref 0.87–1.14)
IRON SATURATION: 25 % (ref 15–50)
IRON: 65 UG/DL (ref 37–145)
LACTIC ACID: 0.7 MMOL/L (ref 0.4–2)
POTASSIUM REFLEX MAGNESIUM: 4.6 MMOL/L (ref 3.5–5.1)
PROTHROMBIN TIME: 14 SEC (ref 11.7–14.5)
SODIUM BLD-SCNC: 141 MMOL/L (ref 136–145)
TOTAL IRON BINDING CAPACITY: 261 UG/DL (ref 260–445)
TROPONIN: 0.06 NG/ML

## 2022-11-19 PROCEDURE — 85730 THROMBOPLASTIN TIME PARTIAL: CPT

## 2022-11-19 PROCEDURE — 3700000001 HC ADD 15 MINUTES (ANESTHESIA): Performed by: INTERNAL MEDICINE

## 2022-11-19 PROCEDURE — 83540 ASSAY OF IRON: CPT

## 2022-11-19 PROCEDURE — 6360000002 HC RX W HCPCS: Performed by: FAMILY MEDICINE

## 2022-11-19 PROCEDURE — 7100000010 HC PHASE II RECOVERY - FIRST 15 MIN: Performed by: INTERNAL MEDICINE

## 2022-11-19 PROCEDURE — 3609010600 HC COLONOSCOPY POLYPECTOMY SNARE/COLD BIOPSY: Performed by: INTERNAL MEDICINE

## 2022-11-19 PROCEDURE — 74176 CT ABD & PELVIS W/O CONTRAST: CPT

## 2022-11-19 PROCEDURE — 83550 IRON BINDING TEST: CPT

## 2022-11-19 PROCEDURE — 2500000003 HC RX 250 WO HCPCS: Performed by: INTERNAL MEDICINE

## 2022-11-19 PROCEDURE — 88305 TISSUE EXAM BY PATHOLOGIST: CPT

## 2022-11-19 PROCEDURE — 2709999900 HC NON-CHARGEABLE SUPPLY: Performed by: INTERNAL MEDICINE

## 2022-11-19 PROCEDURE — 85014 HEMATOCRIT: CPT

## 2022-11-19 PROCEDURE — 80048 BASIC METABOLIC PNL TOTAL CA: CPT

## 2022-11-19 PROCEDURE — 0DBN8ZX EXCISION OF SIGMOID COLON, VIA NATURAL OR ARTIFICIAL OPENING ENDOSCOPIC, DIAGNOSTIC: ICD-10-PCS | Performed by: INTERNAL MEDICINE

## 2022-11-19 PROCEDURE — 84484 ASSAY OF TROPONIN QUANT: CPT

## 2022-11-19 PROCEDURE — 2500000003 HC RX 250 WO HCPCS: Performed by: FAMILY MEDICINE

## 2022-11-19 PROCEDURE — 85610 PROTHROMBIN TIME: CPT

## 2022-11-19 PROCEDURE — 2580000003 HC RX 258: Performed by: INTERNAL MEDICINE

## 2022-11-19 PROCEDURE — A4216 STERILE WATER/SALINE, 10 ML: HCPCS | Performed by: INTERNAL MEDICINE

## 2022-11-19 PROCEDURE — 7100000011 HC PHASE II RECOVERY - ADDTL 15 MIN: Performed by: INTERNAL MEDICINE

## 2022-11-19 PROCEDURE — 2060000000 HC ICU INTERMEDIATE R&B

## 2022-11-19 PROCEDURE — 85018 HEMOGLOBIN: CPT

## 2022-11-19 PROCEDURE — 3700000000 HC ANESTHESIA ATTENDED CARE: Performed by: INTERNAL MEDICINE

## 2022-11-19 PROCEDURE — C9113 INJ PANTOPRAZOLE SODIUM, VIA: HCPCS | Performed by: INTERNAL MEDICINE

## 2022-11-19 PROCEDURE — 6370000000 HC RX 637 (ALT 250 FOR IP): Performed by: INTERNAL MEDICINE

## 2022-11-19 PROCEDURE — 83605 ASSAY OF LACTIC ACID: CPT

## 2022-11-19 PROCEDURE — 36415 COLL VENOUS BLD VENIPUNCTURE: CPT

## 2022-11-19 PROCEDURE — 6360000002 HC RX W HCPCS: Performed by: INTERNAL MEDICINE

## 2022-11-19 RX ORDER — HYDRALAZINE HYDROCHLORIDE 50 MG/1
50 TABLET, FILM COATED ORAL 2 TIMES DAILY
Status: DISCONTINUED | OUTPATIENT
Start: 2022-11-19 | End: 2022-11-21 | Stop reason: HOSPADM

## 2022-11-19 RX ORDER — LIDOCAINE HYDROCHLORIDE 20 MG/ML
INJECTION, SOLUTION EPIDURAL; INFILTRATION; INTRACAUDAL; PERINEURAL PRN
Status: DISCONTINUED | OUTPATIENT
Start: 2022-11-19 | End: 2022-11-19 | Stop reason: SDUPTHER

## 2022-11-19 RX ORDER — SODIUM BICARBONATE 650 MG/1
650 TABLET ORAL 3 TIMES DAILY
Status: DISCONTINUED | OUTPATIENT
Start: 2022-11-19 | End: 2022-11-21

## 2022-11-19 RX ORDER — PROPOFOL 10 MG/ML
INJECTION, EMULSION INTRAVENOUS PRN
Status: DISCONTINUED | OUTPATIENT
Start: 2022-11-19 | End: 2022-11-19 | Stop reason: SDUPTHER

## 2022-11-19 RX ORDER — VITAMIN B COMPLEX
1000 TABLET ORAL DAILY
Status: DISCONTINUED | OUTPATIENT
Start: 2022-11-20 | End: 2022-11-21 | Stop reason: HOSPADM

## 2022-11-19 RX ORDER — ALLOPURINOL 100 MG/1
100 TABLET ORAL DAILY
Status: DISCONTINUED | OUTPATIENT
Start: 2022-11-20 | End: 2022-11-21 | Stop reason: HOSPADM

## 2022-11-19 RX ADMIN — LIDOCAINE HYDROCHLORIDE 100 MG: 20 INJECTION, SOLUTION EPIDURAL; INFILTRATION; INTRACAUDAL; PERINEURAL at 07:58

## 2022-11-19 RX ADMIN — PANTOPRAZOLE SODIUM 40 MG: 40 INJECTION, POWDER, LYOPHILIZED, FOR SOLUTION INTRAVENOUS at 04:21

## 2022-11-19 RX ADMIN — BARIUM SULFATE 450 ML: 1 SUSPENSION ORAL at 10:50

## 2022-11-19 RX ADMIN — PROPOFOL 120 MCG/KG/MIN: 10 INJECTION, EMULSION INTRAVENOUS at 07:59

## 2022-11-19 RX ADMIN — PROPOFOL 100 MG: 10 INJECTION, EMULSION INTRAVENOUS at 07:58

## 2022-11-19 RX ADMIN — METOPROLOL TARTRATE 25 MG: 25 TABLET, FILM COATED ORAL at 21:23

## 2022-11-19 RX ADMIN — ACETAMINOPHEN 650 MG: 325 TABLET ORAL at 09:24

## 2022-11-19 RX ADMIN — SODIUM BICARBONATE 650 MG: 650 TABLET ORAL at 21:23

## 2022-11-19 RX ADMIN — HYDRALAZINE HYDROCHLORIDE 50 MG: 50 TABLET, FILM COATED ORAL at 21:22

## 2022-11-19 RX ADMIN — SODIUM CHLORIDE, PRESERVATIVE FREE 10 ML: 5 INJECTION INTRAVENOUS at 21:23

## 2022-11-19 RX ADMIN — SERTRALINE HYDROCHLORIDE 50 MG: 50 TABLET ORAL at 09:24

## 2022-11-19 ASSESSMENT — PAIN DESCRIPTION - ONSET: ONSET: ON-GOING

## 2022-11-19 ASSESSMENT — PAIN SCALES - GENERAL
PAINLEVEL_OUTOF10: 0
PAINLEVEL_OUTOF10: 3

## 2022-11-19 ASSESSMENT — PAIN DESCRIPTION - LOCATION: LOCATION: HEAD

## 2022-11-19 ASSESSMENT — PAIN DESCRIPTION - PAIN TYPE: TYPE: ACUTE PAIN

## 2022-11-19 ASSESSMENT — PAIN DESCRIPTION - FREQUENCY: FREQUENCY: CONTINUOUS

## 2022-11-19 ASSESSMENT — PAIN DESCRIPTION - ORIENTATION: ORIENTATION: MID

## 2022-11-19 ASSESSMENT — PAIN - FUNCTIONAL ASSESSMENT: PAIN_FUNCTIONAL_ASSESSMENT: ACTIVITIES ARE NOT PREVENTED

## 2022-11-19 ASSESSMENT — PAIN DESCRIPTION - DESCRIPTORS: DESCRIPTORS: THROBBING

## 2022-11-19 NOTE — ANESTHESIA POSTPROCEDURE EVALUATION
Department of Anesthesiology  Postprocedure Note    Patient: Hali Jim  MRN: [id number]  YOB: 1950  Date of evaluation: 11/19/2022      Procedure Summary     Date: 11/19/22 Room / Location: [facility]    Anesthesia Start: 4263 Anesthesia Stop:     Procedure: COLONOSCOPY POLYPECTOMY SNARE/COLD BIOPSY Diagnosis:       Gastrointestinal hemorrhage, unspecified gastrointestinal hemorrhage type      (Gastrointestinal hemorrhage, unspecified gastrointestinal hemorrhage type [K92.2])    Surgeons: Kandace Boudreaux MD Responsible Provider: Elenita Kenny MD    Anesthesia Type: MAC ASA Status: 4          Anesthesia Type: No value filed.     Richard Phase I: Richard Score: 10    Richard Phase II: Richard Score: 10      Anesthesia Post Evaluation    Patient location during evaluation: PACU  Level of consciousness: awake  Complications: no  Multimodal analgesia pain management approach

## 2022-11-19 NOTE — PROGRESS NOTES
Hospitalist Progress Note      PCP: Zurdo Carpenter MD    Date of Admission: 11/17/2022      Hospital Course: 17-year-old female with a past medical history of diabetes mellitus, hypertension, coronary artery disease, status post stents who presents to the hospital with black tarry stools, blood per rectum for the last 1 week as well as shortness of breath and anemia. Subjective:    Patient seen and examined  Status post colonoscopy today  Going for CT enterography    Medications:  Reviewed    Infusion Medications    sodium chloride      sodium chloride      sodium chloride 100 mL/hr at 11/18/22 2310    sodium chloride       Scheduled Medications    sodium chloride flush  5-40 mL IntraVENous 2 times per day    pantoprazole (PROTONIX) 40 mg injection  40 mg IntraVENous Q24H    sertraline  50 mg Oral Daily     PRN Meds: sodium chloride, sodium chloride flush, sodium chloride, ondansetron **OR** ondansetron, acetaminophen **OR** acetaminophen, sodium chloride      Intake/Output Summary (Last 24 hours) at 11/19/2022 1356  Last data filed at 11/19/2022 0411  Gross per 24 hour   Intake 1450.42 ml   Output 0 ml   Net 1450.42 ml       Physical Exam Performed:    /77   Pulse 83   Temp 97.4 °F (36.3 °C) (Oral)   Resp 18   Ht 5' (1.524 m)   Wt 217 lb 13 oz (98.8 kg)   SpO2 97%   BMI 42.54 kg/m²     General appearance: No apparent distress, appears stated age and cooperative. HEENT: Pupils equal, round, and reactive to light. Conjunctivae/corneas clear. Neck: Supple, with full range of motion. No jugular venous distention. Trachea midline. Respiratory:  Normal respiratory effort. Clear to auscultation, bilaterally without Rales/Wheezes/Rhonchi. Cardiovascular: Regular rate and rhythm with normal S1/S2 without murmurs, rubs or gallops. Abdomen: Soft, non-tender, non-distended with normal bowel sounds. Musculoskeletal: No clubbing, cyanosis or edema bilaterally.   Full range of motion without deformity. Skin: Skin color, texture, turgor normal.  No rashes or lesions. Neurologic:  Neurovascularly intact without any focal sensory/motor deficits. Cranial nerves: II-XII intact, grossly non-focal.  Psychiatric: Alert and oriented, thought content appropriate, normal insight  Capillary Refill: Brisk, 3 seconds, normal   Peripheral Pulses: +2 palpable, equal bilaterally       Labs:   Recent Labs     11/18/22  0001 11/18/22  0827 11/19/22  0033 11/19/22  0258 11/19/22  0909   WBC 8.6  --   --   --   --    HGB 6.3*   < > 8.2* 7.9* 7.8*   HCT 19.0*   < > 24.5* 23.1* 23.2*     --   --   --   --     < > = values in this interval not displayed. Recent Labs     11/18/22  0001 11/19/22  0519    141   K 4.7 4.6    109   CO2 19* 19*   BUN 66* 55*   CREATININE 3.7* 3.6*   CALCIUM 9.3 9.5     Recent Labs     11/18/22  0001   AST 19   ALT 14   BILITOT <0.2   ALKPHOS 65     Recent Labs     11/19/22  0519   INR 1.08     Recent Labs     11/18/22  0001 11/19/22  0519   TROPONINI 0.05* 0.06*       Urinalysis:      Lab Results   Component Value Date/Time    NITRU POSITIVE 02/04/2022 10:53 AM    WBCUA 10-20 02/04/2022 10:53 AM    BACTERIA 3+ 02/04/2022 10:53 AM    RBCUA 0-2 02/04/2022 10:53 AM    BLOODU Negative 02/04/2022 10:53 AM    SPECGRAV 1.020 02/04/2022 10:53 AM    GLUCOSEU Negative 02/04/2022 10:53 AM       Radiology:  CT ENTEROGRAPHY WO CONTRAST   Final Result      1. No evidence of bowel obstruction. 2. No acute findings in the abdomen or pelvis. XR CHEST (2 VW)   Final Result      No acute cardiopulmonary disease          IP CONSULT TO GI  IP CONSULT TO HOSPITALIST  IP CONSULT TO GI    Assessment/Plan:    Active Hospital Problems    Diagnosis     GI bleed [K92.2]      Priority: Medium     Cassandra  2. BRBPR  3. Shortness of breath  4. Acute blood loss anemia  5. Hypertension  6. CAD s/p stenting on brillinta  and aspirin   7.  Diabetes mellitus type 2     Plan  -Holding blood thinners, per GI , can resume Brilinta in 2 to 3 days  -Transfuse for hb < 8, check H&H every 12 hours  -Consulted GI, appreciate recommendations, status post EGD and colonoscopy 11/19 with 3 mm polyp in mid sigmoid biopsied and removed, moderate left sided diverticulosis, no blood in colon no melena small internal hemorrhoids no ongoing source of bleeding identified,   -CT enterography on 11/19, no source identified, needs outpatient capsule endoscopy, GI to set it up  -Avoid NSAIDs  - Protonix QD  -Telemetry    DVT Prophylaxis: scds  Diet: ADULT DIET;  Full Liquid  Code Status: Full Code  PT/OT Eval Status: n/a     Dispo - inpatient , possible d/c tomorrow if feeling better         Perlita Morocho MD

## 2022-11-19 NOTE — PLAN OF CARE
Problem: Discharge Planning  Goal: Discharge to home or other facility with appropriate resources  Outcome: Progressing  Flowsheets (Taken 11/19/2022 0913)  Discharge to home or other facility with appropriate resources: Identify barriers to discharge with patient and caregiver     Problem: Chronic Conditions and Co-morbidities  Goal: Patient's chronic conditions and co-morbidity symptoms are monitored and maintained or improved  Outcome: Progressing  Flowsheets (Taken 11/19/2022 0913)  Care Plan - Patient's Chronic Conditions and Co-Morbidity Symptoms are Monitored and Maintained or Improved: Monitor and assess patient's chronic conditions and comorbid symptoms for stability, deterioration, or improvement     Problem: Pain  Goal: Verbalizes/displays adequate comfort level or baseline comfort level  Outcome: Progressing  Flowsheets (Taken 11/19/2022 1135)  Verbalizes/displays adequate comfort level or baseline comfort level:   Encourage patient to monitor pain and request assistance   Assess pain using appropriate pain scale     Problem: Safety - Adult  Goal: Free from fall injury  Outcome: Progressing  Flowsheets (Taken 11/19/2022 1135)  Free From Fall Injury: Instruct family/caregiver on patient safety     Problem: ABCDS Injury Assessment  Goal: Absence of physical injury  11/19/2022 1135 by Kathalene Felty, RN  Outcome: Progressing  Flowsheets (Taken 11/19/2022 0631 by Hedy Boast, RN)  Absence of Physical Injury: Implement safety measures based on patient assessment  11/19/2022 0631 by Hedy Boast, RN  Outcome: Progressing  Flowsheets (Taken 11/19/2022 0631)  Absence of Physical Injury: Implement safety measures based on patient assessment     Problem: Cardiovascular - Adult  Goal: Maintains optimal cardiac output and hemodynamic stability  11/19/2022 1135 by Kathalene Felty, RN  Outcome: Progressing  Flowsheets (Taken 11/19/2022 0913)  Maintains optimal cardiac output and hemodynamic stability: Monitor blood pressure and heart rate  11/19/2022 0631 by Vanessa Irvin RN  Outcome: Progressing  Flowsheets (Taken 11/19/2022 0631)  Maintains optimal cardiac output and hemodynamic stability:   Monitor blood pressure and heart rate   Monitor urine output and notify Licensed Independent Practitioner for values outside of normal range   Assess for signs of decreased cardiac output  Goal: Absence of cardiac dysrhythmias or at baseline  11/19/2022 1135 by Davy Chadwick RN  Outcome: Progressing  Flowsheets (Taken 11/19/2022 0913)  Absence of cardiac dysrhythmias or at baseline: Monitor cardiac rate and rhythm  11/19/2022 0631 by Vanessa Irvin RN  Outcome: Progressing  Flowsheets (Taken 11/19/2022 0631)  Absence of cardiac dysrhythmias or at baseline:   Monitor cardiac rate and rhythm   Assess for signs of decreased cardiac output     Problem: Gastrointestinal - Adult  Goal: Minimal or absence of nausea and vomiting  Outcome: Progressing  Flowsheets (Taken 11/19/2022 0913)  Minimal or absence of nausea and vomiting: Administer IV fluids as ordered to ensure adequate hydration  Goal: Maintains or returns to baseline bowel function  Outcome: Progressing  Flowsheets (Taken 11/19/2022 0913)  Maintains or returns to baseline bowel function: Assess bowel function  Goal: Maintains adequate nutritional intake  Outcome: Progressing  Flowsheets (Taken 11/19/2022 0913)  Maintains adequate nutritional intake: Monitor percentage of each meal consumed  Goal: Establish and maintain optimal ostomy function  Outcome: Progressing  Flowsheets (Taken 11/19/2022 0913)  Establish and maintain optimal ostomy function: Administer IV fluids and TPN as ordered     Problem: Hematologic - Adult  Goal: Maintains hematologic stability  11/19/2022 1135 by Davy Chadwick RN  Outcome: Progressing  Flowsheets (Taken 11/19/2022 0913)  Maintains hematologic stability: Assess for signs and symptoms of bleeding or hemorrhage  11/19/2022 0631 by Vanessa Irvin RN  Outcome: Progressing  Flowsheets (Taken 11/19/2022 0631)  Maintains hematologic stability:   Assess for signs and symptoms of bleeding or hemorrhage   Monitor labs for bleeding or clotting disorders   Administer blood products/factors as ordered

## 2022-11-19 NOTE — PROGRESS NOTES
Patient has been NPO since midnight. Pt completed drinking 4 liters of polyethylene glycol at approximately 0500 and nurse administered tap water enema at 0530. Patient tolerated both well. Patient bowel prep successful. She now has clear liquid stool. Patient did not have any blood in stools overnight. BP and HR remained WNL. Patient is ready for colonoscopy.

## 2022-11-19 NOTE — PROGRESS NOTES
CT ENTEROGRAPHY WO CONTRAST [[id number]]    Collected: 11/19/22 1221    Updated: 11/19/22 1229    Narrative:     INDICATION: GI bleed of uncertain etiology. COMPARISON: CT pelvis 5/21/2022     TECHNIQUE: Helically acquired axial unenhanced images were obtained through the abdomen and pelvis with multiplanar reformats. Up-to-date CT equipment and radiation dose reduction techniques were employed. IV Contrast: None   Oral Contrast: Volumen     FINDINGS:     LUNG BASES: Partial imaging normal heart size. Coronary artery calcification consistent with coronary artery disease. Linear scarring or subsegmental atelectasis in the right lower lobe. LIVER: Normal.     GALLBLADDER AND BILIARY TREE: Previous cholecystectomy. No intra- or extrahepatic biliary dilatation. PANCREAS: Normal.     SPLEEN: Normal.     ADRENAL GLANDS: Normal.     KIDNEYS AND URETERS: Nonobstructing calculus mid to lower pole left kidney measures 6 mm. No other calculi. Bilateral water attenuation renal cysts. No hydronephrosis. URINARY BLADDER: Normal.     REPRODUCTIVE ORGANS: No associated masses. BOWEL: Normal diameter, nonobstructed. Appendix not directly visualized. No acute inflammatory process in the right lower quadrant. No evidence of bowel obstruction. Mild diverticulosis without evidence of diverticulitis. LYMPH NODES: No abnormally enlarged nodes. PERITONEUM/RETROPERITONEUM: No ascites or free air. VESSELS: Aorta normal in caliber. ABDOMINAL WALL: There is a moderate fat-containing periumbilical hernia. There is an immediately adjacent smaller fat-containing periumbilical hernia just superior to the umbilicus. No herniated bowel loops. BONES: No acute abnormality. Impression:       1. No evidence of bowel obstruction. 2. No acute findings in the abdomen or pelvis.        We will call her to set up OP Endocapsule  DC on QD PPI  Management of kidney stone per IM  GI will sign off recall if needed ---

## 2022-11-19 NOTE — PROCEDURES
Colonoscopy REPORT    Patient:  Peter Chu                  1950    Endoscopist: STEF Hollis     Indication:  GI  bleeding     Medications:  MAC    Pre-Anesthesia Assessment:  I have reviewed and am in agreement with patient history and medication, including previous response to sedation. Prior to the procedure, a History and Physical was performed, and patient medications and allergies were reviewed. The risks and benefits of the procedure and the sedation options and risks were discussed with the patient. Complications included but were not limited to infection, bleeding, perforation, death, and missed lesions. All questions were answered and informed consent was obtained by Dr Errol Parham. Patient identification and proposed procedure were verified by the physician and the nurse in the pre-procedure area and in the procedure room. Mallampatti: 3  ASA Grade Assessment: 3    After reviewing the risks and benefits, the patient was deemed in satisfactory condition to undergo the procedure. The anesthesia plan was to use MAC sedation. Immediately prior to administration of medications, the patient was re-assessed for adequacy to receive sedatives and a time out was performed. Patient and healthcare providers were in agreement it was the correct patient and procedure. The heart rate, respiratory rate, oxygen saturations, blood pressure, adequacy of pulmonary ventilation, and response to care were monitored throughout the procedure. The physical status of the patient was re-assessed after the procedure. After adequate sedation was achieved in stepwise fashion a rectal examination was performed and showed no masses. The pediatric colonoscope was then advanced atraumatically into the rectum and advanced without difficulty to the cecum. The cecum was identified by the appendiceal orifice the ileocecal valve and other normal anatomy and a picture was obtained.       The scope was then withdrawn (>6minutes) with close inspection of the mucosa in a circumferential manor. 3 mm polyp in mid sigmoid, biopsied and removed. Moderate left sided and mild right sided diverticulosis. No blood in colon. Minor scope trauma just distal to ICV    Retroflexed views of the rectum showed small internal hemorrhoids    No immediate complications. The preparation was good. Estimated blood loss trace    Impression:  3 mm polyp in mid sigmoid, biopsied and removed  Moderate left sided and mild right sided diverticulosis  No blood in colon, no melena  Minor scope trauma just distal to ICV  Small internal hemorrhoids  No ongoing source of bleeding, ? Small bowel vs diverticular    Plan:  The patient is aware it is their responsibility to call for biopsy results in 7 days.   Repeat colonoscopy pending path   CT enterography  today in anticipation of outpatient endo capsule  IF CTE is OK can advance diet  If no further bleeding resume Brilinta in 2 -3 days

## 2022-11-19 NOTE — PLAN OF CARE
Problem: ABCDS Injury Assessment  Goal: Absence of physical injury  Outcome: Progressing  Flowsheets (Taken 11/19/2022 0631)  Absence of Physical Injury: Implement safety measures based on patient assessment     Problem: Cardiovascular - Adult  Goal: Maintains optimal cardiac output and hemodynamic stability  Outcome: Progressing  Flowsheets (Taken 11/19/2022 0631)  Maintains optimal cardiac output and hemodynamic stability:   Monitor blood pressure and heart rate   Monitor urine output and notify Licensed Independent Practitioner for values outside of normal range   Assess for signs of decreased cardiac output     Problem: Cardiovascular - Adult  Goal: Absence of cardiac dysrhythmias or at baseline  Outcome: Progressing  Flowsheets (Taken 11/19/2022 0631)  Absence of cardiac dysrhythmias or at baseline:   Monitor cardiac rate and rhythm   Assess for signs of decreased cardiac output     Problem: Hematologic - Adult  Goal: Maintains hematologic stability  Outcome: Progressing  Flowsheets (Taken 11/19/2022 0631)  Maintains hematologic stability:   Assess for signs and symptoms of bleeding or hemorrhage   Monitor labs for bleeding or clotting disorders   Administer blood products/factors as ordered

## 2022-11-19 NOTE — ANESTHESIA PRE PROCEDURE
Department of Anesthesiology  Preprocedure Note       Name:  Mohini Munoz   Age:  67 y.o.  :  1950                                          MRN:  [id number]         Date:  2022      Surgeon: Jesus Lara):  Bisi Doherty MD    Procedure: Procedure(s):  COLONOSCOPY DIAGNOSTIC/STOMA    Medications prior to admission:   Prior to Admission medications    Medication Sig Start Date End Date Taking? Authorizing Provider   Evolocumab (REPATHA SURECLICK) 137 MG/ML SOAJ INJECT CONTENTS OF 1 PEN SUBCUTANEOUSLY EVERY 14 DAYS 22   ALETA Alvarez CNP   allopurinol (ZYLOPRIM) 100 MG tablet  22   Historical Provider, MD   sodium bicarbonate 650 MG tablet TAKE 1 TABLET (650 MG TOTAL) BY MOUTH 3 TIMES DAILY. 22   Historical Provider, MD   sodium zirconium cyclosilicate (LOKELMA) 10 g PACK oral suspension Take 10 g by mouth Every Monday, Wednesday, and 21   Historical Provider, MD   lidocaine (LIDODERM) 5 % Place 1 patch onto the skin daily 12 hours on, 12 hours off.   Patient not taking: Reported on 2022   ALETA Price CNP   cyclobenzaprine (FLEXERIL) 10 MG tablet Take 1 tablet by mouth 3 times daily as needed for Muscle spasms  Patient not taking: Reported on 2022   ALETA Price CNP   ticagrelor (BRILINTA) 60 MG TABS tablet TAKE 1 TABLET TWICE A DAY 22   Delia Casas MD   aspirin 81 MG EC tablet Take 81 mg by mouth daily    Historical Provider, MD   glipiZIDE (GLUCOTROL XL) 2.5 MG extended release tablet Take 2.5 mg by mouth 2 times daily (with meals)    Historical Provider, MD   metoprolol tartrate (LOPRESSOR) 25 MG tablet Take 25 mg by mouth 2 times daily    Historical Provider, MD   hydrALAZINE (APRESOLINE) 50 MG tablet Take 50 mg by mouth 2 times daily    Historical Provider, MD   sertraline (ZOLOFT) 50 MG tablet Take 50 mg by mouth daily    Historical Provider, MD   vitamin D (CHOLECALCIFEROL) 25 MCG (1000 UT) TABS tablet Take 1,000 Units by mouth daily    Historical Provider, MD       Current medications:    No current facility-administered medications for this visit. No current outpatient medications on file. Facility-Administered Medications Ordered in Other Visits   Medication Dose Route Frequency Provider Last Rate Last Admin    0.9 % sodium chloride infusion   IntraVENous PRN Anum Conner, APRN - CNP        sodium chloride flush 0.9 % injection 5-40 mL  5-40 mL IntraVENous 2 times per day Rosa Sharpe MD   10 mL at 11/18/22 2100    sodium chloride flush 0.9 % injection 5-40 mL  5-40 mL IntraVENous PRN Rosa Sharpe MD        0.9 % sodium chloride infusion   IntraVENous PRN Victor Manuel Ponce MD        ondansetron (ZOFRAN-ODT) disintegrating tablet 4 mg  4 mg Oral Q8H PRN Victor Manuel Ponce MD        Or    ondansetron [facility]) injection 4 mg  4 mg IntraVENous Q6H PRN Victor Manuel Ponce MD        acetaminophen (TYLENOL) tablet 650 mg  650 mg Oral Q6H PRN Victor Manuel Ponce MD   650 mg at 11/18/22 1517    Or    acetaminophen (TYLENOL) suppository 650 mg  650 mg Rectal Q6H PRN Victor Manuel Ponce MD        0.9 % sodium chloride infusion   IntraVENous Continuous Alayna Waldrop  mL/hr at 11/18/22 2310 New Bag at 11/18/22 2310    pantoprazole (PROTONIX) 40 mg in sodium chloride (PF) 0.9 % 10 mL injection  40 mg IntraVENous Q24H Victor Manuel Ponce MD   40 mg at 11/19/22 0421    0.9 % sodium chloride infusion   IntraVENous PRN Alayna Waldrop MD        sertraline (ZOLOFT) tablet 50 mg  50 mg Oral Daily Alayna Waldrop MD   50 mg at 11/18/22 1843       Allergies:     Allergies   Allergen Reactions    Crestor [Rosuvastatin Calcium]     Statins        Problem List:    Patient Active Problem List   Diagnosis Code    Coronary angioplasty status Z98.61    Essential hypertension I10    Pure hypercholesterolemia E78.00    Gout M10.9    Type 2 diabetes mellitus without complication ([facility] [address] E11.9    Angina pectoris ([facility] [address] I20.9    S/P coronary angiogram Z98.890    Acute pancreatitis without infection or necrosis K85.90    CAD (coronary artery disease) I25.10    Morbid obesity with BMI of 40.0-44.9, adult ([facility]) E66.01, Z68.41    COVID-19 U07.1    GI bleed K92.2       Past Medical History:        Diagnosis Date    CAD (coronary artery disease)     CAD (coronary artery disease) 10/20/2016    Distal RCA 3.0 x 15 mm [place] CRISTEL    Diabetes mellitus ([facility] [address]     Essential hypertension     Gout     Hyperlipidemia        Past Surgical History:        Procedure Laterality Date    ANGIOPLASTY      GS    APPENDECTOMY      BREAST BIOPSY       SECTION      CHOLECYSTECTOMY      CT BIOPSY RENAL  2021    CT BIOPSY RENAL 2021 [facility] CT SCAN    HERNIA REPAIR         Social History:    Social History     Tobacco Use    Smoking status: Never    Smokeless tobacco: Never   Substance Use Topics    Alcohol use: No                                Counseling given: Not Answered      Vital Signs (Current): There were no vitals filed for this visit.                                            BP Readings from Last 3 Encounters:   22 (!) 159/79   22 120/72   22 (!) 203/89       NPO Status:                                                                                 BMI:   Wt Readings from Last 3 Encounters:   22 217 lb 13 oz (98.8 kg)   22 219 lb (99.3 kg)   22 222 lb (100.7 kg)     There is no height or weight on file to calculate BMI.    CBC:   Lab Results   Component Value Date/Time    WBC 8.6 2022 12:01 AM    RBC 1.97 2022 12:01 AM    HGB 7.9 2022 02:58 AM    HCT 23.1 2022 02:58 AM    MCV 96.4 2022 12:01 AM    RDW 15.2 2022 12:01 AM     2022 12:01 AM       CMP:   Lab Results   Component Value Date/Time     2022 05:19 AM    K 4.6 2022 05:19 AM     2022 05:19 AM    CO2 19 11/19/2022 05:19 AM    BUN 55 11/19/2022 05:19 AM    CREATININE 3.6 11/19/2022 05:19 AM    GFRAA 18 02/17/2022 10:19 AM    AGRATIO 1.4 11/18/2022 12:01 AM    LABGLOM 13 11/19/2022 05:19 AM    GLUCOSE 101 11/19/2022 05:19 AM    PROT 7.1 11/18/2022 12:01 AM    CALCIUM 9.5 11/19/2022 05:19 AM    BILITOT <0.2 11/18/2022 12:01 AM    ALKPHOS 65 11/18/2022 12:01 AM    AST 19 11/18/2022 12:01 AM    ALT 14 11/18/2022 12:01 AM       POC Tests: No results for input(s): POCGLU, POCNA, POCK, POCCL, POCBUN, POCHEMO, POCHCT in the last 72 hours. Coags:   Lab Results   Component Value Date/Time    PROTIME 14.0 11/19/2022 05:19 AM    INR 1.08 11/19/2022 05:19 AM    APTT 29.2 11/19/2022 05:19 AM       HCG (If Applicable): No results found for: PREGTESTUR, PREGSERUM, HCG, HCGQUANT     ABGs: No results found for: PHART, PO2ART, ADX4SCU, QHA9ICW, BEART, E9KWRNJA     Type & Screen (If Applicable):  No results found for: LABABO, LABRH    Drug/Infectious Status (If Applicable):  No results found for: HIV, HEPCAB    COVID-19 Screening (If Applicable):   Lab Results   Component Value Date/Time    COVID19 Positive 12/16/2020 08:53 PM    COVID19 DETECTED 11/19/2020 11:48 AM           Anesthesia Evaluation  Nursing notes reviewed no history of anesthetic complications:   Airway: Mallampati: II  TM distance: >3 FB   Neck ROM: full  Mouth opening: > = 3 FB   Dental: normal exam         Pulmonary:normal exam                               Cardiovascular:  Exercise tolerance: no interval change,   (+) hypertension:, angina: no interval change, CAD:, dysrhythmias:,       ECG reviewed  Rhythm: regular  Rate: normal  Echocardiogram reviewed         Beta Blocker:  Dose within 24 Hrs         Neuro/Psych:               GI/Hepatic/Renal:   (+) morbid obesity          Endo/Other:    (+) DiabetesType II DM, , blood dyscrasia: anticoagulation therapy:., .                 Abdominal:   (+) obese,     Abdomen: soft. Vascular:           Other Findings: Anesthesia Plan      MAC     ASA 4       Induction: intravenous. Anesthetic plan and risks discussed with patient. Plan discussed with CRNA.                     Thalia Suárez MD   11/19/2022

## 2022-11-20 LAB
ANION GAP SERPL CALCULATED.3IONS-SCNC: 10 MMOL/L (ref 3–16)
BLOOD BANK DISPENSE STATUS: NORMAL
BLOOD BANK DISPENSE STATUS: NORMAL
BLOOD BANK PRODUCT CODE: NORMAL
BLOOD BANK PRODUCT CODE: NORMAL
BPU ID: NORMAL
BPU ID: NORMAL
BUN BLDV-MCNC: 43 MG/DL (ref 7–20)
CALCIUM SERPL-MCNC: 8.8 MG/DL (ref 8.3–10.6)
CHLORIDE BLD-SCNC: 113 MMOL/L (ref 99–110)
CO2: 19 MMOL/L (ref 21–32)
CREAT SERPL-MCNC: 3.3 MG/DL (ref 0.6–1.2)
DESCRIPTION BLOOD BANK: NORMAL
DESCRIPTION BLOOD BANK: NORMAL
GFR SERPL CREATININE-BSD FRML MDRD: 14 ML/MIN/{1.73_M2}
GLUCOSE BLD-MCNC: 126 MG/DL (ref 70–99)
HCT VFR BLD CALC: 22.3 % (ref 36–48)
HCT VFR BLD CALC: 22.5 % (ref 36–48)
HEMOGLOBIN: 7.4 G/DL (ref 12–16)
HEMOGLOBIN: 7.5 G/DL (ref 12–16)
POTASSIUM REFLEX MAGNESIUM: 4.9 MMOL/L (ref 3.5–5.1)
SODIUM BLD-SCNC: 142 MMOL/L (ref 136–145)

## 2022-11-20 PROCEDURE — 80048 BASIC METABOLIC PNL TOTAL CA: CPT

## 2022-11-20 PROCEDURE — 36415 COLL VENOUS BLD VENIPUNCTURE: CPT

## 2022-11-20 PROCEDURE — 85014 HEMATOCRIT: CPT

## 2022-11-20 PROCEDURE — 6370000000 HC RX 637 (ALT 250 FOR IP): Performed by: INTERNAL MEDICINE

## 2022-11-20 PROCEDURE — C9113 INJ PANTOPRAZOLE SODIUM, VIA: HCPCS | Performed by: INTERNAL MEDICINE

## 2022-11-20 PROCEDURE — 2580000003 HC RX 258: Performed by: INTERNAL MEDICINE

## 2022-11-20 PROCEDURE — 85018 HEMOGLOBIN: CPT

## 2022-11-20 PROCEDURE — A4216 STERILE WATER/SALINE, 10 ML: HCPCS | Performed by: INTERNAL MEDICINE

## 2022-11-20 PROCEDURE — 2060000000 HC ICU INTERMEDIATE R&B

## 2022-11-20 PROCEDURE — 36430 TRANSFUSION BLD/BLD COMPNT: CPT

## 2022-11-20 PROCEDURE — 6360000002 HC RX W HCPCS: Performed by: INTERNAL MEDICINE

## 2022-11-20 RX ORDER — NIFEDIPINE 60 MG/1
60 TABLET, EXTENDED RELEASE ORAL DAILY
Status: DISCONTINUED | OUTPATIENT
Start: 2022-11-20 | End: 2022-11-21 | Stop reason: HOSPADM

## 2022-11-20 RX ORDER — SODIUM CHLORIDE 9 MG/ML
INJECTION, SOLUTION INTRAVENOUS PRN
Status: DISCONTINUED | OUTPATIENT
Start: 2022-11-20 | End: 2022-11-21 | Stop reason: HOSPADM

## 2022-11-20 RX ADMIN — SERTRALINE HYDROCHLORIDE 50 MG: 50 TABLET ORAL at 09:01

## 2022-11-20 RX ADMIN — SODIUM BICARBONATE 650 MG: 650 TABLET ORAL at 14:11

## 2022-11-20 RX ADMIN — METOPROLOL TARTRATE 25 MG: 25 TABLET, FILM COATED ORAL at 09:01

## 2022-11-20 RX ADMIN — HYDRALAZINE HYDROCHLORIDE 50 MG: 50 TABLET, FILM COATED ORAL at 09:01

## 2022-11-20 RX ADMIN — ALLOPURINOL 100 MG: 100 TABLET ORAL at 09:01

## 2022-11-20 RX ADMIN — METOPROLOL TARTRATE 25 MG: 25 TABLET, FILM COATED ORAL at 21:15

## 2022-11-20 RX ADMIN — Medication 1000 UNITS: at 09:02

## 2022-11-20 RX ADMIN — SODIUM CHLORIDE, PRESERVATIVE FREE 10 ML: 5 INJECTION INTRAVENOUS at 09:02

## 2022-11-20 RX ADMIN — SODIUM CHLORIDE, PRESERVATIVE FREE 10 ML: 5 INJECTION INTRAVENOUS at 21:11

## 2022-11-20 RX ADMIN — SODIUM BICARBONATE 650 MG: 650 TABLET ORAL at 21:15

## 2022-11-20 RX ADMIN — HYDRALAZINE HYDROCHLORIDE 50 MG: 50 TABLET, FILM COATED ORAL at 21:15

## 2022-11-20 RX ADMIN — SODIUM CHLORIDE, PRESERVATIVE FREE 10 ML: 5 INJECTION INTRAVENOUS at 21:12

## 2022-11-20 RX ADMIN — PANTOPRAZOLE SODIUM 40 MG: 40 INJECTION, POWDER, LYOPHILIZED, FOR SOLUTION INTRAVENOUS at 00:39

## 2022-11-20 RX ADMIN — NIFEDIPINE 60 MG: 60 TABLET, FILM COATED, EXTENDED RELEASE ORAL at 14:11

## 2022-11-20 RX ADMIN — SODIUM BICARBONATE 650 MG: 650 TABLET ORAL at 09:02

## 2022-11-20 RX ADMIN — ACETAMINOPHEN 650 MG: 325 TABLET ORAL at 09:06

## 2022-11-20 ASSESSMENT — PAIN DESCRIPTION - ORIENTATION
ORIENTATION: MID

## 2022-11-20 ASSESSMENT — PAIN SCALES - GENERAL
PAINLEVEL_OUTOF10: 0
PAINLEVEL_OUTOF10: 3
PAINLEVEL_OUTOF10: 0
PAINLEVEL_OUTOF10: 3

## 2022-11-20 ASSESSMENT — PAIN DESCRIPTION - LOCATION
LOCATION: HEAD

## 2022-11-20 ASSESSMENT — PAIN - FUNCTIONAL ASSESSMENT
PAIN_FUNCTIONAL_ASSESSMENT: ACTIVITIES ARE NOT PREVENTED

## 2022-11-20 ASSESSMENT — PAIN DESCRIPTION - DESCRIPTORS
DESCRIPTORS: SHARP

## 2022-11-20 NOTE — PROGRESS NOTES
Hospitalist Progress Note      PCP: Garrett Corona MD    Date of Admission: 11/17/2022      Hospital Course: 20-year-old female with a past medical history of diabetes mellitus, hypertension, coronary artery disease, status post stents who presents to the hospital with black tarry stools, blood per rectum for the last 1 week as well as shortness of breath and anemia. Subjective:    Patient seen and examined  She had an episode of bright red blood per rectum, size of a golf ball  Denies lightheadedness dizziness  Hb 7.4  BMP pending for today, creatinine trending up    Medications:  Reviewed    Infusion Medications    sodium chloride      sodium chloride      sodium chloride       Scheduled Medications    NIFEdipine  60 mg Oral Daily    allopurinol  100 mg Oral Daily    hydrALAZINE  50 mg Oral BID    metoprolol tartrate  25 mg Oral BID    sodium bicarbonate  650 mg Oral TID    [START ON 11/21/2022] sodium zirconium cyclosilicate  10 g Oral Once per day on Mon Wed Fri    Vitamin D  1,000 Units Oral Daily    sodium chloride flush  5-40 mL IntraVENous 2 times per day    pantoprazole (PROTONIX) 40 mg injection  40 mg IntraVENous Q24H    sertraline  50 mg Oral Daily     PRN Meds: sodium chloride, sodium chloride flush, sodium chloride, ondansetron **OR** ondansetron, acetaminophen **OR** acetaminophen, sodium chloride      Intake/Output Summary (Last 24 hours) at 11/20/2022 1334  Last data filed at 11/20/2022 1000  Gross per 24 hour   Intake 720 ml   Output --   Net 720 ml       Physical Exam Performed:    /63   Pulse 76   Temp 97.9 °F (36.6 °C) (Oral)   Resp 16   Ht 5' (1.524 m)   Wt 217 lb 13 oz (98.8 kg)   SpO2 98%   BMI 42.54 kg/m²     General appearance: No apparent distress, appears stated age and cooperative. HEENT: Pupils equal, round, and reactive to light. Conjunctivae/corneas clear. Neck: Supple, with full range of motion. No jugular venous distention. Trachea midline.   Respiratory: Normal respiratory effort. Clear to auscultation, bilaterally without Rales/Wheezes/Rhonchi. Cardiovascular: Regular rate and rhythm with normal S1/S2 without murmurs, rubs or gallops. Abdomen: Soft, non-tender, non-distended with normal bowel sounds. Musculoskeletal: No clubbing, cyanosis or edema bilaterally. Full range of motion without deformity. Skin: Skin color, texture, turgor normal.  No rashes or lesions. Neurologic:  Neurovascularly intact without any focal sensory/motor deficits. Cranial nerves: II-XII intact, grossly non-focal.  Psychiatric: Alert and oriented, thought content appropriate, normal insight  Capillary Refill: Brisk, 3 seconds, normal   Peripheral Pulses: +2 palpable, equal bilaterally       Labs:   Recent Labs     11/18/22  0001 11/18/22 0827 11/19/22 0909 11/19/22 2129 11/20/22 0831   WBC 8.6  --   --   --   --    HGB 6.3*   < > 7.8* 7.6* 7.4*   HCT 19.0*   < > 23.2* 22.1* 22.3*     --   --   --   --     < > = values in this interval not displayed. Recent Labs     11/18/22  0001 11/19/22  0519    141   K 4.7 4.6    109   CO2 19* 19*   BUN 66* 55*   CREATININE 3.7* 3.6*   CALCIUM 9.3 9.5     Recent Labs     11/18/22  0001   AST 19   ALT 14   BILITOT <0.2   ALKPHOS 65     Recent Labs     11/19/22  0519   INR 1.08     Recent Labs     11/18/22  0001 11/19/22  0519   TROPONINI 0.05* 0.06*       Urinalysis:      Lab Results   Component Value Date/Time    NITRU POSITIVE 02/04/2022 10:53 AM    WBCUA 10-20 02/04/2022 10:53 AM    BACTERIA 3+ 02/04/2022 10:53 AM    RBCUA 0-2 02/04/2022 10:53 AM    BLOODU Negative 02/04/2022 10:53 AM    SPECGRAV 1.020 02/04/2022 10:53 AM    GLUCOSEU Negative 02/04/2022 10:53 AM       Radiology:  CT ENTEROGRAPHY WO CONTRAST   Final Result      1. No evidence of bowel obstruction. 2. No acute findings in the abdomen or pelvis.       XR CHEST (2 VW)   Final Result      No acute cardiopulmonary disease          IP CONSULT TO GI  IP CONSULT TO HOSPITALIST  IP CONSULT TO GI  IP CONSULT TO NEPHROLOGY    Assessment/Plan:    Active Hospital Problems    Diagnosis     GI bleed [K92.2]      Priority: Medium     Cassandra  2. BRBPR  3. Shortness of breath  4. Acute blood loss anemia  5. Hypertension  6. CAD s/p stenting on brillinta  and aspirin   7. Diabetes mellitus type 2  8. CHRISTINE on CKD     Plan  -Holding blood thinners, per GI , can resume Brilinta in 2 to 3 days  -Transfuse for hb < 8, check H&H every 12 hours.   -Consulted GI, appreciate recommendations, status post EGD and colonoscopy 11/19 with 3 mm polyp in mid sigmoid biopsied and removed, moderate left sided diverticulosis, no blood in colon no melena small internal hemorrhoids no ongoing source of bleeding identified,   -CT enterography on 11/19, no source identified, needs outpatient capsule endoscopy, GI to set it up  -We will reconsult GI if she has further bleeding, suspicion for post polypectomy episode   -Avoid NSAIDs  - Protonix QD  -Telemetry  -Consult nephrology for CHRISTINE    DVT Prophylaxis: scds  Diet: ADULT DIET;  Regular  Code Status: Full Code  PT/OT Eval Status: n/a     Dispo - inpatient , 1 more day, monitoring hemoglobin and creatinine      Tiffany Kamara MD

## 2022-11-20 NOTE — PLAN OF CARE
Problem: Hematologic - Adult  Goal: Maintains hematologic stability  11/20/2022 0121 by Bebe Johnson RN  Outcome: Progressing  Flowsheets (Taken 11/20/2022 0121)  Maintains hematologic stability:   Assess for signs and symptoms of bleeding or hemorrhage   Monitor labs for bleeding or clotting disorders   Administer blood products/factors as ordered     Problem: Pain  Goal: Verbalizes/displays adequate comfort level or baseline comfort level  11/20/2022 0119 by Bebe Johnson RN  Outcome: Progressing  Flowsheets (Taken 11/20/2022 0119)  Verbalizes/displays adequate comfort level or baseline comfort level:   Encourage patient to monitor pain and request assistance   Assess pain using appropriate pain scale   Administer analgesics based on type and severity of pain and evaluate response   Implement non-pharmacological measures as appropriate and evaluate response     Problem: Chronic Conditions and Co-morbidities  Goal: Patient's chronic conditions and co-morbidity symptoms are monitored and maintained or improved  11/20/2022 0119 by Bebe Johnson RN  Outcome: Progressing  Flowsheets (Taken 11/20/2022 0119)  Care Plan - Patient's Chronic Conditions and Co-Morbidity Symptoms are Monitored and Maintained or Improved:   Monitor and assess patient's chronic conditions and comorbid symptoms for stability, deterioration, or improvement   Collaborate with multidisciplinary team to address chronic and comorbid conditions and prevent exacerbation or deterioration   Update acute care plan with appropriate goals if chronic or comorbid symptoms are exacerbated and prevent overall improvement and discharge     Problem: Safety - Adult  Goal: Free from fall injury  11/20/2022 0119 by Bebe Johnson RN  Outcome: Progressing  Flowsheets (Taken 11/20/2022 0119)  Free From Fall Injury: Instruct family/caregiver on patient safety     Problem: Discharge Planning  Goal: Discharge to home or other facility with appropriate resources  11/20/2022 0119 by Sebastien Benton RN  Outcome: Progressing  Flowsheets (Taken 11/20/2022 0119)  Discharge to home or other facility with appropriate resources:   Identify barriers to discharge with patient and caregiver   Arrange for needed discharge resources and transportation as appropriate   Identify discharge learning needs (meds, wound care, etc)   Refer to discharge planning if patient needs post-hospital services based on physician order or complex needs related to functional status, cognitive ability or social support system

## 2022-11-20 NOTE — PLAN OF CARE
Problem: Discharge Planning  Goal: Discharge to home or other facility with appropriate resources  11/20/2022 1433 by Matt Jean RN  Outcome: Progressing  Flowsheets (Taken 11/20/2022 0119 by Radhika Darden RN)  Discharge to home or other facility with appropriate resources:   Identify barriers to discharge with patient and caregiver   Arrange for needed discharge resources and transportation as appropriate   Identify discharge learning needs (meds, wound care, etc)   Refer to discharge planning if patient needs post-hospital services based on physician order or complex needs related to functional status, cognitive ability or social support system  11/20/2022 0119 by Radhika Darden RN  Outcome: Progressing  Flowsheets (Taken 11/20/2022 0119)  Discharge to home or other facility with appropriate resources:   Identify barriers to discharge with patient and caregiver   Arrange for needed discharge resources and transportation as appropriate   Identify discharge learning needs (meds, wound care, etc)   Refer to discharge planning if patient needs post-hospital services based on physician order or complex needs related to functional status, cognitive ability or social support system     Problem: Chronic Conditions and Co-morbidities  Goal: Patient's chronic conditions and co-morbidity symptoms are monitored and maintained or improved  11/20/2022 1433 by Matt Jean RN  Outcome: Progressing  Flowsheets (Taken 11/20/2022 0119 by Radhika Darden RN)  Care Plan - Patient's Chronic Conditions and Co-Morbidity Symptoms are Monitored and Maintained or Improved:   Monitor and assess patient's chronic conditions and comorbid symptoms for stability, deterioration, or improvement   Collaborate with multidisciplinary team to address chronic and comorbid conditions and prevent exacerbation or deterioration   Update acute care plan with appropriate goals if chronic or comorbid symptoms are exacerbated and prevent overall improvement and discharge  11/20/2022 0119 by Hugo Marie RN  Outcome: Progressing  Flowsheets (Taken 11/20/2022 0119)  Care Plan - Patient's Chronic Conditions and Co-Morbidity Symptoms are Monitored and Maintained or Improved:   Monitor and assess patient's chronic conditions and comorbid symptoms for stability, deterioration, or improvement   Collaborate with multidisciplinary team to address chronic and comorbid conditions and prevent exacerbation or deterioration   Update acute care plan with appropriate goals if chronic or comorbid symptoms are exacerbated and prevent overall improvement and discharge     Problem: Pain  Goal: Verbalizes/displays adequate comfort level or baseline comfort level  11/20/2022 1433 by Rafael Buckley RN  Outcome: Progressing  Flowsheets (Taken 11/20/2022 0119 by Hugo Marie RN)  Verbalizes/displays adequate comfort level or baseline comfort level:   Encourage patient to monitor pain and request assistance   Assess pain using appropriate pain scale   Administer analgesics based on type and severity of pain and evaluate response   Implement non-pharmacological measures as appropriate and evaluate response  11/20/2022 0119 by Hugo Marie RN  Outcome: Progressing  Flowsheets (Taken 11/20/2022 0119)  Verbalizes/displays adequate comfort level or baseline comfort level:   Encourage patient to monitor pain and request assistance   Assess pain using appropriate pain scale   Administer analgesics based on type and severity of pain and evaluate response   Implement non-pharmacological measures as appropriate and evaluate response     Problem: Safety - Adult  Goal: Free from fall injury  11/20/2022 1433 by Rafael Buckley RN  Outcome: Progressing  Flowsheets (Taken 11/20/2022 0119 by Hugo Marie RN)  Free From Fall Injury: Instruct family/caregiver on patient safety  11/20/2022 0119 by Hugo Marie RN  Outcome: Progressing  Flowsheets (Taken 11/20/2022 0119)  Free From Fall Injury: Instruct family/caregiver on patient safety     Problem: ABCDS Injury Assessment  Goal: Absence of physical injury  Outcome: Progressing  Flowsheets (Taken 11/19/2022 0631 by Miesha Arnett RN)  Absence of Physical Injury: Implement safety measures based on patient assessment     Problem: Cardiovascular - Adult  Goal: Maintains optimal cardiac output and hemodynamic stability  Outcome: Progressing  Flowsheets (Taken 11/19/2022 0913)  Maintains optimal cardiac output and hemodynamic stability: Monitor blood pressure and heart rate  Goal: Absence of cardiac dysrhythmias or at baseline  Outcome: Progressing  Flowsheets (Taken 11/19/2022 0913)  Absence of cardiac dysrhythmias or at baseline: Monitor cardiac rate and rhythm     Problem: Gastrointestinal - Adult  Goal: Minimal or absence of nausea and vomiting  Outcome: Progressing  Flowsheets (Taken 11/19/2022 0913)  Minimal or absence of nausea and vomiting: Administer IV fluids as ordered to ensure adequate hydration  Goal: Maintains or returns to baseline bowel function  Outcome: Progressing  Flowsheets (Taken 11/19/2022 0913)  Maintains or returns to baseline bowel function: Assess bowel function  Goal: Maintains adequate nutritional intake  Outcome: Progressing  Flowsheets (Taken 11/19/2022 0913)  Maintains adequate nutritional intake: Monitor percentage of each meal consumed  Goal: Establish and maintain optimal ostomy function  Outcome: Progressing  Flowsheets (Taken 11/19/2022 0913)  Establish and maintain optimal ostomy function: Administer IV fluids and TPN as ordered     Problem: Hematologic - Adult  Goal: Maintains hematologic stability  11/20/2022 1433 by Nae Garcia RN  Outcome: Progressing  Flowsheets (Taken 11/20/2022 0121 by Blanca Hodge RN)  Maintains hematologic stability:   Assess for signs and symptoms of bleeding or hemorrhage   Monitor labs for bleeding or clotting disorders   Administer blood products/factors as ordered  11/20/2022 0121 by Sebastien Benton, RN  Outcome: Progressing  Flowsheets (Taken 11/20/2022 0121)  Maintains hematologic stability:   Assess for signs and symptoms of bleeding or hemorrhage   Monitor labs for bleeding or clotting disorders   Administer blood products/factors as ordered

## 2022-11-20 NOTE — PROGRESS NOTES
Pt unable to receive whole unit of blood due to issues with IV access. Physician notified that patient was only able to receive 50 ml of the blood. Will attempt to regain IV access through resources available.

## 2022-11-21 VITALS
HEART RATE: 81 BPM | BODY MASS INDEX: 42.76 KG/M2 | RESPIRATION RATE: 18 BRPM | SYSTOLIC BLOOD PRESSURE: 133 MMHG | DIASTOLIC BLOOD PRESSURE: 66 MMHG | HEIGHT: 60 IN | WEIGHT: 217.81 LBS | TEMPERATURE: 98 F | OXYGEN SATURATION: 95 %

## 2022-11-21 LAB
ANION GAP SERPL CALCULATED.3IONS-SCNC: 10 MMOL/L (ref 3–16)
ANION GAP SERPL CALCULATED.3IONS-SCNC: 9 MMOL/L (ref 3–16)
BILIRUBIN URINE: NEGATIVE
BLOOD BANK DISPENSE STATUS: NORMAL
BLOOD BANK DISPENSE STATUS: NORMAL
BLOOD BANK PRODUCT CODE: NORMAL
BLOOD BANK PRODUCT CODE: NORMAL
BLOOD, URINE: NEGATIVE
BPU ID: NORMAL
BPU ID: NORMAL
BUN BLDV-MCNC: 41 MG/DL (ref 7–20)
BUN BLDV-MCNC: 42 MG/DL (ref 7–20)
CALCIUM SERPL-MCNC: 9 MG/DL (ref 8.3–10.6)
CALCIUM SERPL-MCNC: 9.1 MG/DL (ref 8.3–10.6)
CHLORIDE BLD-SCNC: 110 MMOL/L (ref 99–110)
CHLORIDE BLD-SCNC: 110 MMOL/L (ref 99–110)
CLARITY: CLEAR
CO2: 21 MMOL/L (ref 21–32)
CO2: 21 MMOL/L (ref 21–32)
COLOR: YELLOW
CREAT SERPL-MCNC: 3.1 MG/DL (ref 0.6–1.2)
CREAT SERPL-MCNC: 3.2 MG/DL (ref 0.6–1.2)
CREATININE URINE: 86.6 MG/DL (ref 28–259)
DESCRIPTION BLOOD BANK: NORMAL
DESCRIPTION BLOOD BANK: NORMAL
EPITHELIAL CELLS, UA: ABNORMAL /HPF (ref 0–5)
GFR SERPL CREATININE-BSD FRML MDRD: 15 ML/MIN/{1.73_M2}
GFR SERPL CREATININE-BSD FRML MDRD: 15 ML/MIN/{1.73_M2}
GLUCOSE BLD-MCNC: 138 MG/DL (ref 70–99)
GLUCOSE BLD-MCNC: 140 MG/DL (ref 70–99)
GLUCOSE URINE: NEGATIVE MG/DL
HCT VFR BLD CALC: 22.8 % (ref 36–48)
HCT VFR BLD CALC: 22.9 % (ref 36–48)
HEMOGLOBIN: 7.7 G/DL (ref 12–16)
HEMOGLOBIN: 7.7 G/DL (ref 12–16)
KETONES, URINE: NEGATIVE MG/DL
LEUKOCYTE ESTERASE, URINE: NEGATIVE
MCH RBC QN AUTO: 31.3 PG (ref 26–34)
MCHC RBC AUTO-ENTMCNC: 33.6 G/DL (ref 31–36)
MCV RBC AUTO: 93.2 FL (ref 80–100)
MICROSCOPIC EXAMINATION: YES
MUCUS: ABNORMAL /LPF
NITRITE, URINE: NEGATIVE
PDW BLD-RTO: 16.8 % (ref 12.4–15.4)
PH UA: 6 (ref 5–8)
PLATELET # BLD: 229 K/UL (ref 135–450)
PMV BLD AUTO: 6.6 FL (ref 5–10.5)
POTASSIUM REFLEX MAGNESIUM: 4.7 MMOL/L (ref 3.5–5.1)
POTASSIUM REFLEX MAGNESIUM: 4.8 MMOL/L (ref 3.5–5.1)
PROTEIN UA: 30 MG/DL
RBC # BLD: 2.44 M/UL (ref 4–5.2)
RBC UA: ABNORMAL /HPF (ref 0–4)
SODIUM BLD-SCNC: 140 MMOL/L (ref 136–145)
SODIUM BLD-SCNC: 141 MMOL/L (ref 136–145)
SODIUM URINE: 108 MMOL/L
SPECIFIC GRAVITY UA: 1.02 (ref 1–1.03)
URINE REFLEX TO CULTURE: ABNORMAL
URINE TYPE: ABNORMAL
UROBILINOGEN, URINE: 0.2 E.U./DL
WBC # BLD: 6.7 K/UL (ref 4–11)
WBC UA: ABNORMAL /HPF (ref 0–5)

## 2022-11-21 PROCEDURE — 85027 COMPLETE CBC AUTOMATED: CPT

## 2022-11-21 PROCEDURE — 81001 URINALYSIS AUTO W/SCOPE: CPT

## 2022-11-21 PROCEDURE — A4216 STERILE WATER/SALINE, 10 ML: HCPCS | Performed by: INTERNAL MEDICINE

## 2022-11-21 PROCEDURE — 6370000000 HC RX 637 (ALT 250 FOR IP): Performed by: STUDENT IN AN ORGANIZED HEALTH CARE EDUCATION/TRAINING PROGRAM

## 2022-11-21 PROCEDURE — 6360000002 HC RX W HCPCS: Performed by: INTERNAL MEDICINE

## 2022-11-21 PROCEDURE — 2580000003 HC RX 258: Performed by: INTERNAL MEDICINE

## 2022-11-21 PROCEDURE — 36415 COLL VENOUS BLD VENIPUNCTURE: CPT

## 2022-11-21 PROCEDURE — 85014 HEMATOCRIT: CPT

## 2022-11-21 PROCEDURE — 82570 ASSAY OF URINE CREATININE: CPT

## 2022-11-21 PROCEDURE — 84300 ASSAY OF URINE SODIUM: CPT

## 2022-11-21 PROCEDURE — 85018 HEMOGLOBIN: CPT

## 2022-11-21 PROCEDURE — 36430 TRANSFUSION BLD/BLD COMPNT: CPT

## 2022-11-21 PROCEDURE — 6370000000 HC RX 637 (ALT 250 FOR IP): Performed by: INTERNAL MEDICINE

## 2022-11-21 PROCEDURE — 80048 BASIC METABOLIC PNL TOTAL CA: CPT

## 2022-11-21 PROCEDURE — C9113 INJ PANTOPRAZOLE SODIUM, VIA: HCPCS | Performed by: INTERNAL MEDICINE

## 2022-11-21 RX ORDER — SODIUM BICARBONATE 650 MG/1
1300 TABLET ORAL 2 TIMES DAILY
Qty: 60 TABLET | Refills: 0 | Status: SHIPPED | OUTPATIENT
Start: 2022-11-21

## 2022-11-21 RX ORDER — SODIUM BICARBONATE 650 MG/1
1300 TABLET ORAL 2 TIMES DAILY
Status: DISCONTINUED | OUTPATIENT
Start: 2022-11-21 | End: 2022-11-21 | Stop reason: HOSPADM

## 2022-11-21 RX ORDER — SODIUM CHLORIDE 9 MG/ML
INJECTION, SOLUTION INTRAVENOUS PRN
Status: DISCONTINUED | OUTPATIENT
Start: 2022-11-21 | End: 2022-11-21 | Stop reason: HOSPADM

## 2022-11-21 RX ORDER — ASPIRIN 81 MG/1
81 TABLET ORAL DAILY
Qty: 30 TABLET | Refills: 3 | Status: SHIPPED | OUTPATIENT
Start: 2022-11-22

## 2022-11-21 RX ORDER — NIFEDIPINE 60 MG/1
60 TABLET, EXTENDED RELEASE ORAL DAILY
Qty: 30 TABLET | Refills: 3 | Status: SHIPPED | OUTPATIENT
Start: 2022-11-22

## 2022-11-21 RX ADMIN — ALLOPURINOL 100 MG: 100 TABLET ORAL at 09:15

## 2022-11-21 RX ADMIN — SERTRALINE HYDROCHLORIDE 50 MG: 50 TABLET ORAL at 09:14

## 2022-11-21 RX ADMIN — PANTOPRAZOLE SODIUM 40 MG: 40 INJECTION, POWDER, LYOPHILIZED, FOR SOLUTION INTRAVENOUS at 01:51

## 2022-11-21 RX ADMIN — SODIUM BICARBONATE 650 MG: 650 TABLET ORAL at 09:14

## 2022-11-21 RX ADMIN — Medication 1000 UNITS: at 09:14

## 2022-11-21 RX ADMIN — SODIUM ZIRCONIUM CYCLOSILICATE 10 G: 10 POWDER, FOR SUSPENSION ORAL at 17:52

## 2022-11-21 RX ADMIN — NIFEDIPINE 60 MG: 60 TABLET, FILM COATED, EXTENDED RELEASE ORAL at 09:14

## 2022-11-21 RX ADMIN — SODIUM BICARBONATE 1300 MG: 650 TABLET ORAL at 12:18

## 2022-11-21 RX ADMIN — METOPROLOL TARTRATE 25 MG: 25 TABLET, FILM COATED ORAL at 09:15

## 2022-11-21 RX ADMIN — SODIUM CHLORIDE, PRESERVATIVE FREE 10 ML: 5 INJECTION INTRAVENOUS at 09:15

## 2022-11-21 RX ADMIN — HYDRALAZINE HYDROCHLORIDE 50 MG: 50 TABLET, FILM COATED ORAL at 09:14

## 2022-11-21 ASSESSMENT — PAIN SCALES - GENERAL
PAINLEVEL_OUTOF10: 0

## 2022-11-21 NOTE — PLAN OF CARE
Problem: Discharge Planning  Goal: Discharge to home or other facility with appropriate resources  11/21/2022 1516 by Luh Otoole RN  Outcome: Progressing  Note: Pt plans to be discharged home. Problem: Pain  Goal: Verbalizes/displays adequate comfort level or baseline comfort level  11/21/2022 1516 by Luh Otoole RN  Outcome: Progressing  Note: Pt expresses no pain at all. Pt has been educated to call out for RN if pain occurs. Problem: Safety - Adult  Goal: Free from fall injury  11/21/2022 1516 by Luh Otoole RN  Outcome: Progressing  Note: Pt is up ab trini with a steady gait. Problem: Hematologic - Adult  Goal: Maintains hematologic stability  11/21/2022 1516 by Luh Otoole RN  Outcome: Progressing  Note: Patient's hemoglobin this AM:   Recent Labs     11/21/22  0949   HGB 7.7*     Patient's platelet count this AM:   Recent Labs     11/21/22  0949       Thrombocytopenia Precautions in place. Patient showing no signs or symptoms of active bleeding. Patient transfused blood products per orders - see flowsheet. Patient verbalizes understanding of all instructions. Will continue to assess and implement POC. Call light within reach and hourly rounding in place.

## 2022-11-21 NOTE — PROGRESS NOTES
Pt being discharged to home. All discharge instructions, medications and future appointments gone over with pt who verbalized understanding. PIV removed per policy and without issue. All questions answered and belongings gathered/sent with pt. Pt wheeled downstairs via wheelchair for pickup from family friend.      Electronically signed by Olga Merino RN on 11/21/2022 at 6:36 PM

## 2022-11-21 NOTE — PROGRESS NOTES
TRANSFER - IN REPORT:    Verbal report received from Rosmery Correa on Elham Best  being received from PCU for routine progression of patient care      Report consisted of patient's Situation, Background, Assessment and   Recommendations(SBAR). Information from the following report(s) Nurse Handoff Report, Adult Overview, Intake/Output, MAR, and Recent Results was reviewed with the receiving nurse. Opportunity for questions and clarification was provided. Assessment completed upon patient's arrival to unit and care assumed.

## 2022-11-21 NOTE — PLAN OF CARE
Problem: Safety - Adult  Goal: Free from fall injury  Outcome: Progressing  Note: Pt walking with steady gait, up ad trini in the room. Pt alert and oriented x 4 and calls appropriately for needs. Problem: Hematologic - Adult  Goal: Maintains hematologic stability  11/21/2022 0647 by Ting Hebert RN  Outcome: Progressing  Note: H&H checked q12. Last H&H 7.5/22. 5. Additional PIV placed in pts forearm incase of need for RBC transfusion. Pt reported small amount of blood during BM. BM looked brown but there was some bright blood, small clots on toilet paper when wiping. On call hospitalist EVANGELINA Olguin notified and aware.  Will place hat in toilet for better future assessment of BMs       Problem: Discharge Planning  Goal: Discharge to home or other facility with appropriate resources  11/21/2022 0647 by Ting Hebert RN  Outcome: Progressing

## 2022-11-21 NOTE — CARE COORDINATION
Case Management Assessment            Discharge Note                    Date / Time of Note: 11/21/2022 3:55 PM                  Discharge Note Completed by: DAR Carvajal    Patient Name: Bethel Mondragon   YOB: 1950  Diagnosis: Acute gastrointestinal hemorrhage [K92.2]  GI bleed [K92.2]   Date / Time: 11/17/2022 11:12 PM    Current PCP: Neville Newsome MD  Clinic patient: No    Hospitalization in the last 30 days: No    Advance Directives:  Code Status: Full Code  [place] DNR form completed and on chart: No    Financial:  Payor: Dacia Carter / Plan: [employer] HMO / Product Type: Medicare /      Pharmacy:    [address] [phone] - f [phone]  [address] [place] [address]  Phone: [phone] Fax: [phone]    [address] [place] [address] [phone] - f [phone]  [facility] [address]  Phone: [phone] Fax: [address] [phone] - f [phone]  [facility] [address]  Phone: [phone] Fax: [phone]    PVQDSNPZO Q VUSVBEYXK ZMaryjane Mccabe [address] [place] [address] [phone] [facility] [phone]  [address] [place] [address]  Phone: [phone] Fax: [phone]      Assistance purchasing medications?:    Assistance provided by Case Management: None at this time    Does patient want to participate in local refill/ meds to beds program?:      Meds To Beds General Rules:  1. Can ONLY be done Monday- Friday between 8:30am-5pm  2. Prescription(s) must be in pharmacy by 3pm to be filled same day  3. Copy of patient's insurance/ prescription drug card and patient face sheet must be sent along with the prescription(s)  4. Cost of Rx cannot be added to hospital bill.  If financial assistance is needed, please contact unit  or ;  or  CANNOT provide pharmacy voucher for patients co-pays  5. Patients can then  the prescription on their way out of the hospital at discharge, or pharmacy can deliver to the bedside if staff is available. (payment due at time of pick-up or delivery - cash, check, or card accepted)     Able to afford home medications/ co-pay costs: Yes    ADLS:  Current PT AM-PAC Score:   /24  Current OT AM-PAC Score:   /24      DISCHARGE Disposition: Home- No Services Needed    LOC at discharge: Not Applicable  MU Completed: No    Notification completed in HENS/PAS?:  Not Applicable    IMM Completed:   Yes, Case management has presented and reviewed IMM letter #2 to the patient and/or family/ POA. Patient and/or family/POA verbalized understanding of their medicare rights and appeal process if needed. Patient and/or family/POA has signed, initialed and placed today's date (11/21/2022) and time (24-20-52-61) on IMM letter #2 on the the appropriate lines. Patient and/or family/POA, copy of letter offered and they are aware that this original copy of IMM letter #2 is available prior to discharge from the paper chart on the unit. Electronic documentation has been entered into epic for IMM letter #2 and original paper copy has been added to the paper chart at the nurses station.      Transportation:  Transportation PLAN for discharge: family   Mode of Transport: Private Car  Reason for medical transport: Not Applicable  Name of [address] [place] [address], [address]: Not Applicable  Time of Transport: afternoon    Transport form completed: No    Home Care:  [address] ordered at discharge: No  [address]: Not Applicable  Orders faxed: No    Durable Medical Equipment:  DME Provider: none  Equipment obtained during hospitalization: none    Home Oxygen and Respiratory Equipment:  Oxygen needed at discharge?: No  [address]: Not Applicable  Portable tank available for discharge?: No    Dialysis:  Dialysis patient: No    Dialysis Center:  Not Applicable    Hospice Services:  Location: Not Applicable  Agency: Not Applicable    Consents signed: No    Referrals made at [facility] for outpatient continued care:  Not Applicable    Additional CM Notes: Patient to discharge home with no needs today. The Plan for Transition of Care is related to the following treatment goals of Acute gastrointestinal hemorrhage [K92.2]  GI bleed [K92.2]    The Patient and/or patient representative Gabrielle Miranda and her family were provided with a choice of provider and agrees with the discharge plan Yes    Freedom of choice list was provided with basic dialogue that supports the patient's individualized plan of care/goals and shares the quality data associated with the providers.  Yes    Care Transitions patient: No    Sugar Carvajal   Case Management Department  Ph: [phone]  Fax: [phone]

## 2022-11-21 NOTE — CONSULTS
[facility]    [facility]              [phone]                       Plan :     -trend Cr, BMP - currently close to baseline  -continue lokelma for hyperkalemia  -increase sodium bicarbonate to 1300 BID from 650 TID  -check UA and urine electrolytes  -monitor UOP, serum electrolytes  -avoid nephrotoxic agents including NSAIDs, IV contrast, ACE-I         Assessment :       CHRISTINE vs CKD   -admit 11/18, Cr at that time was 3.7, downtrend to 3.1 this am. Biopsy: diabetic nephropathy with 70% IF   -Her Cr has been as high as 4.8 when she was admitted in [address] [place] [address] calculus mid to lower pole left kidney 6 mm, bilateral water attenuation renal cysts on CT enterography  GI Bleed - EGD/colonoscopy 11/19 - mid sigmoid polyp, moderate left sided diverticulosis         [facility] would like to thank Imani Amin MD   for opportunity to serve this patient      Please call with questions at-   24 Hrs Answering service [phone] or  7 am- 5 pm via Perfect serve or cell phone  Robert Prado MD          CC/reason for consult :     CHRISTINE on CKD4     HPI :     Antoine Peguero is a 67 y.o. female presented to   the hospital on 11/17/2022 with chief complaint of black tarry stools and blood per rectum for one week. She has history of coronary artery disease and has been on dual anti-platelet therapy with ASA and brillinta. She saw her PCP outpatient and was told to hold ASA. Denies use of NSAIDs. Denies fever, chills, CP, SOB, n/v. Had two well formed bloody stools yesterday. Interval History:     Patient seen at bedside. Reports two well formed red appearing stools yesterday. Otherwise feels well and is tolerating PO intake.      ROS:     Seen with- Dr. Lupe Denson    positives in bold   Constitutional:  fever, chills, weakness, weight change, fatigue  Skin:  rash, pruritus, hair loss, bruising, dry skin, petechiae  Head, Face, Neck   headaches, swelling,  cervical adenopathy  Respiratory: shortness of breath, cough, or wheezing  Cardiovascular: chest pain, palpitations, dizzy, edema  Gastrointestinal: nausea, vomiting, diarrhea, constipation,belly pain    Yellow skin, blood in stool  Musculoskeletal:  back pain, muscle weakness, gait problems,       joint pain or swelling. Genitourinary:  dysuria, poor urine flow, flank pain, blood in urine  Neurologic:  vertigo, TIA'S, syncope, seizures, focal weakness  Psychosocial:  insomnia, anxiety, or depression. Additional positive findings:                          All other remaining systems are negative.         PMH/PSH/SH/Family History:     Past Medical History:   Diagnosis Date    CAD (coronary artery disease)     CAD (coronary artery disease) 10/20/2016    Distal RCA 3.0 x 15 mm [place] CRISTEL    Diabetes mellitus ([facility] [address]     Essential hypertension     Gout     Hyperlipidemia        Past Surgical History:   Procedure Laterality Date    ANGIOPLASTY      GS    APPENDECTOMY      BREAST BIOPSY       SECTION      CHOLECYSTECTOMY      COLONOSCOPY N/A 2022    COLONOSCOPY POLYPECTOMY SNARE/COLD BIOPSY performed by Brian Jacques MD at [address] [place] [address] [place] BIOPSY RENAL  2021    CT BIOPSY RENAL 2021 [address] SCAN    HERNIA REPAIR      UPPER GASTROINTESTINAL ENDOSCOPY N/A 2022    EGD BIOPSY performed by Brian Jacques MD at [address] [facility] History     Socioeconomic History    Marital status: Single     Spouse name: Not on file    Number of children: Not on file    Years of education: Not on file    Highest education level: Not on file   Occupational History    Not on file   Tobacco Use    Smoking status: Never    Smokeless tobacco: Never   Vaping Use    Vaping Use: Never used   Substance and Sexual Activity    Alcohol use: No    Drug use: Never    Sexual activity: Not on file   Other Topics Concern    Not on file   Social History Narrative    Not on file     Social Determinants of Health Financial Resource Strain: Not on file   Food Insecurity: Not on file   Transportation Needs: Not on file   Physical Activity: Not on file   Stress: Not on file   Social Connections: Not on file   Intimate Partner Violence: Not on file   Housing Stability: Not on file       No family history on file. Medication:     Scheduled Meds:   NIFEdipine  60 mg Oral Daily    allopurinol  100 mg Oral Daily    hydrALAZINE  50 mg Oral BID    metoprolol tartrate  25 mg Oral BID    sodium bicarbonate  650 mg Oral TID    sodium zirconium cyclosilicate  10 g Oral Once per day on Mon Wed Fri    Vitamin D  1,000 Units Oral Daily    sodium chloride flush  5-40 mL IntraVENous 2 times per day    pantoprazole (PROTONIX) 40 mg injection  40 mg IntraVENous Q24H    sertraline  50 mg Oral Daily     Continuous Infusions:   sodium chloride      sodium chloride      sodium chloride      sodium chloride       PRN Meds:.sodium chloride, sodium chloride, sodium chloride flush, sodium chloride, ondansetron **OR** ondansetron, acetaminophen **OR** acetaminophen, sodium chloride       Vitals :     Vitals:    11/21/22 0351   BP: (!) 140/66   Pulse: 67   Resp: 16   Temp: 97.5 °F (36.4 °C)   SpO2: 95%       I & O :       Intake/Output Summary (Last 24 hours) at 11/21/2022 5266  Last data filed at 11/20/2022 2230  Gross per 24 hour   Intake 1231 ml   Output 200 ml   Net 1031 ml        Physical Examination :     General appearance: Anxious- no, distressed- no, in good spirits- yes      HEENT: Lips- normal, teeth- ok , oral mucosa- moist  Neck : Mass- no, appears symmetrical, JVD- not visible  Respiratory: Respiratory effort-  normal, wheeze- no, crackles - no  Cardiovascular:  Ausculation- No M/R/G, Edema   Abdomen: visible mass- no, distention- no, scar- no, tenderness- no                            hepatosplenomegaly-  no  Musculoskeletal:  clubbing no,cyanosis- no , digital ischemia- no                           muscle strength- grossly normal , tone - grossly normal  Skin: rashes- no , ulcers- no, induration- no, tightening - no  Psychiatric:  Judgement and insight- normal           AAO X 3     LABS:     Recent Labs     11/20/22  0831 11/20/22 2049 11/21/22  0756   HGB 7.4* 7.5* 7.7*   HCT 22.3* 22.5* 22.9*     Recent Labs     11/19/22  0519 11/20/22 2049 11/21/22  0756    142 141   K 4.6 4.9 4.7    113* 110   CO2 19* 19* 21   BUN 55* 43* 42*   CREATININE 3.6* 3.3* 3.1*   GLUCOSE 101* 126* 140*          Will discuss with Dr. Trish Galvez MD, MD  Internal Medicine, PGY2

## 2022-11-21 NOTE — PLAN OF CARE
Problem: Discharge Planning  Goal: Discharge to home or other facility with appropriate resources  11/20/2022 2255 by Ken Gresham RN  Outcome: Progressing  Note: No D/C needs identified at this time. Problem: Pain  Goal: Verbalizes/displays adequate comfort level or baseline comfort level  11/20/2022 2255 by Ken Gresham RN  Flowsheets (Taken 11/20/2022 2247)  Verbalizes/displays adequate comfort level or baseline comfort level: Assess pain using appropriate pain scale  Note: Patient denies complaints of pain. Problem: Hematologic - Adult  Goal: Maintains hematologic stability  11/20/2022 2255 by Ken Gresham RN  Flowsheets (Taken 11/20/2022 2247)  Maintains hematologic stability: Assess for signs and symptoms of bleeding or hemorrhage  Note: VSS. Last H/H 7.5/22. 5. No bleeding noted. Possible transfusion tomorrow per report from previous RN.

## 2022-11-23 NOTE — PROGRESS NOTES
Physician Progress Note      PATIENT:               Lyssa Morales  CSN #:                  [id number]  :                       1950  ADMIT DATE:       2022 11:12 PM  [address] DATE:        2022 6:35 PM  RESPONDING  PROVIDER #:        Perlita Morocho MD          QUERY TEXT:    Pt admitted  with GIB and has blood loss anemia documented by GI consult   on . If possible, please document in progress notes and discharge summary   further specificity regarding the acuity of blood loss anemia:    The medical record reflects the following:  Risk Factors: 67 y.o. female with CAD, DM, HTN,  Clinical Indicators: SOB with usual activities, Rectal bleeding. Presented   with H/H of 6.3/19.0. Last HGB in care everywhere was 10.0 on 22  Treatment: GI consult, EGD/Colonoscopy, PRBC transfusion, monitor H&Hs  Options provided:  -- Anemia due to acute blood loss  -- Anemia due to acute on chronic blood loss  -- Anemia due to chronic blood loss  -- Other - I will add my own diagnosis  -- Disagree - Not applicable / Not valid  -- Disagree - Clinically unable to determine / Unknown  -- Refer to Clinical Documentation Reviewer    PROVIDER RESPONSE TEXT:    This patient has acute blood loss anemia. Query created by: Yumiko Barnes on 2022 1:21 PM      QUERY TEXT:    Patient admitted with GIB, noted to have CKD documented by ED. Presenting GFR   12. GFR range prior to admission from 2020 is 10-16. If possible, please   document in progress notes and discharge summary if you are evaluating and/or   treating any of the following: The medical record reflects the following:  Risk Factors: 67 y.o. female with  CAD, DM, HTN, Gout, HLD  Clinical Indicators:  Presenting GFR 12. GFR range prior to admission from   2020 is 10-16.   Treatment: IVF, lab monitoring  Options provided:  -- CKD Stage 5 GFR<15  -- CKD Stage 4 GFR 15-29  -- Other - I will add my own diagnosis  -- Disagree - Not applicable / Not valid  -- Disagree - Clinically unable to determine / Unknown  -- Refer to Clinical Documentation Reviewer    PROVIDER RESPONSE TEXT:    This patient has CKD Stage 4. Query created by: Marcos Brady on 11/19/2022 1:36 PM      QUERY TEXT:    Patient admitted with BMI 43.55  Morbid obesity listed by anesthesia in active   problem without current date. If possible, please document in progress notes   and discharge summary if you are evaluating and /or treating any of the   following: The medical record reflects the following:  Risk Factors: 67 y.o. female with HTN, DM, CAD, HLD, Gout  Clinical Indicators: BMI > 40  Treatment: I&Os, wts    ? Specificity of obesity and morbid obesity should be reported based on   physician documentation, as there are several published classifications and   definitions?  MS-DRG Training Guide. CDC:   https://cook-lind.info/. WHO:   http://tomas.biz/. NIH:   LargeFood.be  Options provided:  -- Morbid obesity  -- Obesity  -- Other - I will add my own diagnosis  -- Disagree - Not applicable / Not valid  -- Disagree - Clinically unable to determine / Unknown  -- Refer to Clinical Documentation Reviewer    PROVIDER RESPONSE TEXT:    This patient has morbid obesity.     Query created by: Marcos Brady on 11/19/2022 1:46 PM      Electronically signed by:  Aristides Jackson MD 11/23/2022 4:20 PM

## 2022-11-25 NOTE — PROGRESS NOTES
Hospitalist Progress Note      PCP: Adeline Goodpasture    Date of Admission: 11/18/2020    Chief Complaint: diarrhea, malaise    Subjective:  Patient seen and examined at the bedside. No complaints at this time. Cr continues to improve, today 2.9. Nephrology following, have switched fluids from bicarb-containing fluids to normal saline. PFHS: reviewed as documented 11/18/2020, no changes    Medications:  Reviewed    Infusion Medications    sodium chloride 75 mL/hr at 11/22/20 0523    dextrose       Scheduled Medications    Evolocumab  140 mg Subcutaneous Q14 Days    lactobacillus  1 capsule Oral Daily    heparin (porcine)  5,000 Units Subcutaneous 3 times per day    sodium chloride flush  10 mL Intravenous 2 times per day    aspirin EC  81 mg Oral Daily    ticagrelor  60 mg Oral BID    metoprolol tartrate  25 mg Oral BID    sertraline  50 mg Oral Daily    hydrALAZINE  50 mg Oral BID    Vitamin D  1,000 Units Oral Daily    insulin lispro  0-12 Units Subcutaneous TID WC    insulin lispro  0-6 Units Subcutaneous Nightly     PRN Meds: sodium chloride flush, acetaminophen **OR** acetaminophen, magnesium hydroxide, promethazine **OR** ondansetron, glucose, dextrose, glucagon (rDNA), dextrose      Intake/Output Summary (Last 24 hours) at 11/22/2020 0953  Last data filed at 11/22/2020 0630  Gross per 24 hour   Intake 2080 ml   Output --   Net 2080 ml         Physical Exam Performed:    BP (!) 143/77   Pulse 80   Temp 97.7 °F (36.5 °C) (Oral)   Resp 18   Ht 5' (1.524 m)   Wt 229 lb 9.6 oz (104.1 kg)   SpO2 94%   BMI 44.84 kg/m²     General appearance:  No apparent distress, appears stated age  Eyes: Pupils equal, round, reactive to light, conjunctiva/corneas clear  Ears/Nose/Mouth/Throat: No external lesions or scars, hearing intact to voice  Neck: Trachea midline, no masses noted  Respiratory:  Normal respiratory effort.  Clear to auscultation bilaterally  Cardiovascular: Regular rate and rhythm, nl S1/S2, w/o murmurs, rubs or gallops, no lower extremity edema  Abdomen: Soft, non-tender, non-distended, no hepatosplenomegaly  Musculoskeletal: No cyanosis, clubbing or petechiae, no lower extremity misalignment, asymmetry, or crepitation  Skin: Normal skin color, texture, turgor. No rashes or lesions noted. Psychiatric: Alert and oriented x4, good insight and judgment    Labs:   Recent Labs     11/20/20  0530 11/21/20  0554 11/22/20  0551   WBC 8.5 8.0 8.1   HGB 11.2* 10.9* 9.7*   HCT 33.1* 32.3* 29.1*    410 427     Recent Labs     11/20/20  0530 11/21/20  0554   * 137   K 3.4* 3.5   CL 97* 99   CO2 23 26   BUN 64* 53*   CREATININE 4.1* 3.4*   CALCIUM 9.0 8.6     No results for input(s): AST, ALT, BILIDIR, BILITOT, ALKPHOS in the last 72 hours. No results for input(s): INR in the last 72 hours. No results for input(s): Kamala Comment in the last 72 hours. Urinalysis:      Lab Results   Component Value Date    NITRU Negative 11/18/2020    WBCUA 3-5 11/18/2020    BACTERIA 2+ 11/18/2020    RBCUA 0-2 11/18/2020    BLOODU SMALL 11/18/2020    SPECGRAV 1.025 11/18/2020    GLUCOSEU Negative 11/18/2020       Radiology:  US RENAL COMPLETE   Final Result      No hydronephrosis      If concern for renal or ureteral calculi CT abdomen and pelvis without contrast recommended. XR CHEST PORTABLE   Final Result       Slight bibasilar opacities. Cardiomegaly.           Assessment/Plan:    Active Hospital Problems    Diagnosis Date Noted    COVID-19 [U07.1] 11/18/2020       Plan:    # Viral gastroenteritis, likely secondary to COVID-19  -likely caused dehydration and orthostasis  -continue IV fluids    # CHRISTINE, likely pre-renal  -nephrology following  -Cr improving (4.8>4.5>4.1>3.4), Cr 2.9 today  -continue IV fluids, will change to NS today     # Gout  -holding allopurinol  -uric acid 7.4     # COVID 19 +  -no shortness of breath or hypoxemia, thus no indication for tx at this time  -Continue to monitor     # T2DM  -sugars well controlled with mealtime and correctional insulin  -carb control diet     # CAD s/p stent 2016  -troponin 0.06>0.05, no chest pain  -suspect this is due to CHRISTINE  -continue brilinta  -Continue to monitor     # HTN  -continue hydralazine, metoprolol    DVT Prophylaxis: heparin  Diet: DIET GENERAL; Carb Control: 4 carb choices (60 gms)/meal  Code Status: Full Code    PT/OT Eval Status: ongoing    Dispo - inpt, dispo pending clinical improvement, improvement in kidney function    Cindy Jesus MD Bites

## 2022-12-04 NOTE — DISCHARGE SUMMARY
Hospital Discharge Summary    Patient's PCP: Morris Keith MD  Admit Date: 11/17/2022   Discharge Date: 11/21/22    Admitting Physician: Dr. Chloe Dickerson MD  Discharge Physician: Dr. Sumanth Bautista MD     Consults:   IP CONSULT TO GI  IP CONSULT TO HOSPITALIST  IP CONSULT TO GI  IP CONSULT TO NEPHROLOGY    Brief HPI: Patient is a 77-year-old female with past medical history of diabetes mellitus, hypertension who presents to the hospital for black tarry stool, blood per rectum for last one week. She went to her pcp and was told to stop asa, when bleeding stopped for a day, she resumed her asa and bleeding returned on Monday. She also complained of shortness of breath with usual activities. ED workup showed, low hb of 6.8. Brief hospital course:   Possible GI bleed of uncertain etiology:-s/p colonoscopy, 3 mm polyp in mid sigmoid, biopsied and removed  Moderate left sided and mild right sided diverticulosis, No blood in colon, no melena, Minor scope trauma just distal to ICV, Small internal hemorrhoids. CT enterography, unremarkable, will need outpatient capsule study  -okay to resume brillinta per GI.   -s.p multple blood transfusions for hb > 8      Invasive procedures:  S/p colonoscopy    Discharge Diagnoses:   Principal Problem:    GI bleed  Resolved Problems:    * No resolved hospital problems. *      Physical Exam: /66   Pulse 81   Temp 98 °F (36.7 °C) (Oral)   Resp 18   Ht 5' (1.524 m)   Wt 217 lb 13 oz (98.8 kg)   SpO2 95%   BMI 42.54 kg/m²   Gen/overall appearance: Not in acute distress. Alert. Head: Normocephalic, atraumatic  Eyes: EOMI, good acuity  ENT:- Oral mucosa moist  Neck: No JVD, thyromegaly  CVS: Nml S1S2, no MRG, RRR  Pulm: Clear bilaterally. No crackles/wheezes  Gastrointestinal: Soft, NT/ND, +BS  Musculoskeletal: No edema. Warm  Neuro: No focal deficit. Moves extremity spontaneously. Psychiatry: Appropriate affect. Not agitated.   Skin: Warm, dry with normal turgor. No rash        Significant diagnostic studies that may require follow up:  XR CHEST (2 VW)    Result Date: 11/18/2022  TWO VIEWS OF THE CHEST 11/17/2022 11:12 PM HISTORY:shortness of breath COMPARISON: November 8, 2021 PROCEDURE: PA and lateral views of the chest were obtained. FINDINGS: The lungs are clear. The cardiomediastinal contours are within normal limits There are no visible pleural abnormalities There are no acute osseous abnormalities. No acute cardiopulmonary disease    CT ENTEROGRAPHY WO CONTRAST    Result Date: 11/19/2022  INDICATION: GI bleed of uncertain etiology. COMPARISON: CT pelvis 1/04/8484 TECHNIQUE: Helically acquired axial unenhanced images were obtained through the abdomen and pelvis with multiplanar reformats. Up-to-date CT equipment and radiation dose reduction techniques were employed. IV Contrast: None Oral Contrast: Volumen FINDINGS: LUNG BASES: Partial imaging normal heart size. Coronary artery calcification consistent with coronary artery disease. Linear scarring or subsegmental atelectasis in the right lower lobe. LIVER: Normal. GALLBLADDER AND BILIARY TREE: Previous cholecystectomy. No intra- or extrahepatic biliary dilatation. PANCREAS: Normal. SPLEEN: Normal. ADRENAL GLANDS: Normal. KIDNEYS AND URETERS: Nonobstructing calculus mid to lower pole left kidney measures 6 mm. No other calculi. Bilateral water attenuation renal cysts. No hydronephrosis. URINARY BLADDER: Normal. REPRODUCTIVE ORGANS: No associated masses. BOWEL: Normal diameter, nonobstructed. Appendix not directly visualized. No acute inflammatory process in the right lower quadrant. No evidence of bowel obstruction. Mild diverticulosis without evidence of diverticulitis. LYMPH NODES: No abnormally enlarged nodes. PERITONEUM/RETROPERITONEUM: No ascites or free air. VESSELS: Aorta normal in caliber. ABDOMINAL WALL: There is a moderate fat-containing periumbilical hernia.  There is an immediately adjacent smaller fat-containing periumbilical hernia just superior to the umbilicus. No herniated bowel loops. BONES: No acute abnormality. 1. No evidence of bowel obstruction. 2. No acute findings in the abdomen or pelvis. EGD    Result Date: 11/18/2022  No dictation     Colonoscopy    Result Date: 11/19/2022  No dictation             Treatments: As above. Discharge Medications:     Medication List        START taking these medications      NIFEdipine 60 MG extended release tablet  Commonly known as: PROCARDIA XL  Take 1 tablet by mouth daily  Notes to patient: Nifedipine  Uses: is used to treat high blood pressure  Side Effects: dizziness, chest pain, mood changes            CHANGE how you take these medications      sodium bicarbonate 650 MG tablet  Take 2 tablets by mouth 2 times daily  What changed: See the new instructions. CONTINUE taking these medications      allopurinol 100 MG tablet  Commonly known as: ZYLOPRIM     aspirin 81 MG EC tablet  Take 1 tablet by mouth daily     glipiZIDE 2.5 MG extended release tablet  Commonly known as: GLUCOTROL XL     hydrALAZINE 50 MG tablet  Commonly known as: APRESOLINE     metoprolol tartrate 25 MG tablet  Commonly known as: LOPRESSOR     Repatha SureClick 377 MG/ML Soaj  Generic drug: Evolocumab  INJECT CONTENTS OF 1 PEN SUBCUTANEOUSLY EVERY 14 DAYS     sertraline 50 MG tablet  Commonly known as: ZOLOFT     sodium zirconium cyclosilicate 10 g Pack oral suspension  Commonly known as: LOKELMA  Notes to patient: Take 2 hours before or after other medication taken.      ticagrelor 60 MG Tabs tablet  Commonly known as: Brilinta  TAKE 1 TABLET TWICE A DAY     vitamin D 25 MCG (1000 UT) Tabs tablet  Commonly known as: CHOLECALCIFEROL            ASK your doctor about these medications      cyclobenzaprine 10 MG tablet  Commonly known as: FLEXERIL  Take 1 tablet by mouth 3 times daily as needed for Muscle spasms     lidocaine 5 %  Commonly known as: Lidoderm  Place 1 patch onto the skin daily 12 hours on, 12 hours off. Where to Get Your Medications        These medications were sent to [facility]/pharmacy #[id number]- [address]. [address] - F [phone]  [address], [facility] [address]      Phone: [phone]   aspirin 81 MG EC tablet  NIFEdipine 60 MG extended release tablet  sodium bicarbonate 650 MG tablet  ticagrelor 60 MG Tabs tablet         Activity: activity as tolerated  Diet: No diet orders on file      Disposition: home  Discharged Condition: Stable  Follow Up: Brandi Abraham [address] [place] [address]  [phone]    Schedule an appointment as soon as possible for a visit in 1 week(s)  hospital follow up    Kiara Paez MD  [address], [facility]  [phone]    Schedule an appointment as soon as possible for a visit  capsule study        Code status:  Prior         Total time spent on discharge, finalizing medications, referrals and arranging outpatient follow up was more than 30 minutes      Thank you Dr. Brandi Abraham MD for the opportunity to be involved in this patients care.

## 2023-01-03 NOTE — TELEPHONE ENCOUNTER
Requested Prescriptions     Pending Prescriptions Disp Refills    ticagrelor (BRILINTA) 60 MG TABS tablet [Pharmacy Med Name: Sarah Fowler 60'S 60MG] 180 tablet 3     Sig: TAKE 1 TABLET TWICE A DAY            Last Office Visit:8/18/22    Next Office Visit: Visit date not found

## 2023-08-04 NOTE — TELEPHONE ENCOUNTER
Rivka Lazo from [facility] called needing a refill for patient.               Evolocumab (Maureen Galeazzi) 442 MG/ML SOAJ                  [ [id number]]     Order Details  Dose, Route, Frequency: As Directed   Dispense Quantity: 6 pen  Refills: 3          Sig: INJECT CONTENTS OF 1 PEN SUBCUTANEOUSLY EVERY [facility] #[id number] Cindy Mathew, [facility] [facility],[address] [facility] [phone] [facility] [phone] [address] [address] [place] [address] [employer] [address]   Phone:  [phone]  Fax:  [phone]

## 2023-08-07 RX ORDER — EVOLOCUMAB 140 MG/ML
INJECTION, SOLUTION SUBCUTANEOUS
Qty: 1 ADJUSTABLE DOSE PRE-FILLED PEN SYRINGE | Refills: 0 | Status: SHIPPED | OUTPATIENT
Start: 2023-08-07

## 2023-10-09 NOTE — TELEPHONE ENCOUNTER
Requested Prescriptions     Pending Prescriptions Disp Refills    Evolocumab (Tierra Seller) 172 MG/ML [facility] [Pharmacy Med Name: Dapmoisese Cam 140MG/ML PF AUTO INJ] 2 mL      Sig: INJECT CONTENTS OF 1 PEN UNDER THE SKIN EVERY 14 DAYS          Last Office Visit: 8/18/2022    Next Office Visit: 11/17/2023 Detail Level: Simple

## 2023-10-10 RX ORDER — EVOLOCUMAB 140 MG/ML
INJECTION, SOLUTION SUBCUTANEOUS
Qty: 2 ML | Refills: 3 | Status: SHIPPED | OUTPATIENT
Start: 2023-10-10

## 2023-10-10 RX ORDER — EVOLOCUMAB 140 MG/ML
INJECTION, SOLUTION SUBCUTANEOUS
Qty: 2 ML | Refills: 1 | Status: SHIPPED | OUTPATIENT
Start: 2023-10-10

## 2023-10-10 NOTE — TELEPHONE ENCOUNTER
Requested Prescriptions     Pending Prescriptions Disp Refills    Evolocumab (Joanna Ruth) 515 MG/ML 1200 [facility] [Pharmacy Med Name: Jocelyne Dardenuzair 140MG/ML PF AUTO INJ] 2 mL 3     Sig: INJECT CONTENTS OF 1 PEN UNDER THE SKIN EVERY 14 DAYS            Last Office Visit: 08.18.2022    Next Office Visit: 11.17.2023        Last Labs: 11.21.2022

## 2023-11-17 ENCOUNTER — TELEPHONE (OUTPATIENT)
Dept: CARDIOLOGY CLINIC | Age: 73
End: 2023-11-17

## 2023-11-17 NOTE — TELEPHONE ENCOUNTER
Submitted PA for REPATHA  [facility] Key: [id number] STATUS: PENDING.    Follow up done daily; if no response in three days we will refax for status check.  If another three days goes by with no response we will call the insurance for status.

## 2023-12-14 ENCOUNTER — OFFICE VISIT (OUTPATIENT)
Dept: CARDIOLOGY CLINIC | Age: 73
End: 2023-12-14
Payer: MEDICARE

## 2023-12-14 VITALS
WEIGHT: 222 LBS | HEIGHT: 60 IN | BODY MASS INDEX: 43.59 KG/M2 | HEART RATE: 63 BPM | DIASTOLIC BLOOD PRESSURE: 70 MMHG | SYSTOLIC BLOOD PRESSURE: 126 MMHG

## 2023-12-14 DIAGNOSIS — I10 ESSENTIAL HYPERTENSION: ICD-10-CM

## 2023-12-14 DIAGNOSIS — E78.5 HYPERLIPIDEMIA LDL GOAL <70: Primary | ICD-10-CM

## 2023-12-14 PROCEDURE — 3074F SYST BP LT 130 MM HG: CPT | Performed by: INTERNAL MEDICINE

## 2023-12-14 PROCEDURE — 3078F DIAST BP <80 MM HG: CPT | Performed by: INTERNAL MEDICINE

## 2023-12-14 PROCEDURE — 1123F ACP DISCUSS/DSCN MKR DOCD: CPT | Performed by: INTERNAL MEDICINE

## 2023-12-14 PROCEDURE — 99214 OFFICE O/P EST MOD 30 MIN: CPT | Performed by: INTERNAL MEDICINE

## 2023-12-14 NOTE — PROGRESS NOTES
Smokeless Tobacco Never     Current Medications:  Prior to Visit Medications    Medication Sig Taking? Authorizing Provider   Evolocumab 140 MG/ML SOAJ Inject into the skin Yes Pam Alston MD   Acetaminophen 325 MG CAPS Take by mouth Yes Pam Alston MD   Evolocumab (REPATHA SURECLICK) 361 MG/ML SOAJ INJECT CONTENTS OF 1 PEN UNDER THE SKIN EVERY 14 DAYS Yes ALETA Patterson CNP   ticagrelor (BRILINTA) 60 MG TABS tablet TAKE 1 TABLET TWICE A DAY (NEED TO MAKE A FOLLOW UP APPOINTMENT) Yes Vijay Solomon MD   aspirin 81 MG EC tablet Take 1 tablet by mouth daily Yes Jose Mayes MD   sodium bicarbonate 650 MG tablet Take 2 tablets by mouth 2 times daily Yes Jose Mayse MD   NIFEdipine (PROCARDIA XL) 60 MG extended release tablet Take 1 tablet by mouth daily Yes Jose Mayes MD   allopurinol (ZYLOPRIM) 100 MG tablet  Yes Pam Alston MD   sodium zirconium cyclosilicate (LOKELMA) 10 g PACK oral suspension Take 1 packet by mouth Every Monday, Wednesday, and Friday Yes Pam Alston MD   lidocaine (LIDODERM) 5 % Place 1 patch onto the skin daily 12 hours on, 12 hours off.  Yes ALETA Sepulveda CNP   glipiZIDE (GLUCOTROL XL) 2.5 MG extended release tablet Take 1 tablet by mouth 2 times daily (with meals) Yes Pam Alston MD   metoprolol tartrate (LOPRESSOR) 25 MG tablet Take 1 tablet by mouth 2 times daily Yes Pam Alston MD   hydrALAZINE (APRESOLINE) 50 MG tablet Take 1 tablet by mouth 2 times daily Yes Pam Alston MD   sertraline (ZOLOFT) 50 MG tablet Take 1 tablet by mouth daily Yes Pam Alston MD   vitamin D (CHOLECALCIFEROL) 25 MCG (1000 UT) TABS tablet Take 1 tablet by mouth daily Yes Pam Alston MD   cyclobenzaprine (FLEXERIL) 10 MG tablet Take 1 tablet by mouth 3 times daily as needed for Muscle spasms  Patient not taking: Reported on 11/18/2022  ALETA Sepulveda CNP     Family History  No

## 2024-03-07 ENCOUNTER — TELEPHONE (OUTPATIENT)
Dept: CARDIOLOGY CLINIC | Age: 74
End: 2024-03-07

## 2024-03-07 NOTE — TELEPHONE ENCOUNTER
PT CALLED REQUESTING THAT ONE MONTH WORTH OF MEDICATION BE SUBMITTED TO HER LOCAL PHARMACY AND THE REST TO THE MAIL IN AS THE PT IS RUNNING OUT. PRESCRIPTION NOTES THAT THE PT NEEDS TO SCHEDULE AN APPT. SCHEDULED FIRST AVAILABLE WITH CANDI (06.20.2024)    [facility] Medication Refills:    ticagrelor (BRILINTA) 60 MG TABS tablet [[id number]]    Order Details  Dose, Route, Frequency: As Directed   Dispense Quantity: 180 tablet Refills: 3          Sig: TAKE 1 TABLET TWICE A DAY (NEED TO MAKE A FOLLOW UP APPOINTMENT)         Start Date: 08/04/23       ONE MONTH SUPPLY TO     [facility]/PHARMACY #[id number] - [place], [place] - [address] - P [phone] - F [phone] [[id number]]     REMAINDER SENT TO    [facility] - [address]MS - [address] - P [phone] - F [phone] [[id number]]       Last Office Visit: 12.14.2023    Next Office Visit: 06.20.2024    Last Refill:     Last Labs: 11.21.2022

## 2024-03-25 ENCOUNTER — HOSPITAL ENCOUNTER (EMERGENCY)
Age: 74
Discharge: HOME | End: 2024-03-25
Attending: EMERGENCY MEDICINE
Payer: MEDICARE

## 2024-03-25 ENCOUNTER — APPOINTMENT (OUTPATIENT)
Dept: GENERAL RADIOLOGY | Age: 74
End: 2024-03-25
Payer: MEDICARE

## 2024-03-25 VITALS
HEART RATE: 70 BPM | DIASTOLIC BLOOD PRESSURE: 84 MMHG | TEMPERATURE: 98.3 F | HEIGHT: 60 IN | OXYGEN SATURATION: 99 % | WEIGHT: 226 LBS | SYSTOLIC BLOOD PRESSURE: 178 MMHG | RESPIRATION RATE: 16 BRPM | BODY MASS INDEX: 44.37 KG/M2

## 2024-03-25 DIAGNOSIS — M17.11 PRIMARY OSTEOARTHRITIS OF RIGHT KNEE: Primary | ICD-10-CM

## 2024-03-25 PROCEDURE — 73562 X-RAY EXAM OF KNEE 3: CPT

## 2024-03-25 PROCEDURE — 6370000000 HC RX 637 (ALT 250 FOR IP): Performed by: EMERGENCY MEDICINE

## 2024-03-25 PROCEDURE — 99283 EMERGENCY DEPT VISIT LOW MDM: CPT

## 2024-03-25 RX ORDER — LIDOCAINE 4 G/G
1 PATCH TOPICAL DAILY
Status: DISCONTINUED | OUTPATIENT
Start: 2024-03-25 | End: 2024-03-25 | Stop reason: HOSPADM

## 2024-03-25 ASSESSMENT — PAIN - FUNCTIONAL ASSESSMENT: PAIN_FUNCTIONAL_ASSESSMENT: 0-10

## 2024-03-25 ASSESSMENT — PAIN DESCRIPTION - ORIENTATION: ORIENTATION: RIGHT

## 2024-03-25 ASSESSMENT — PAIN SCALES - GENERAL: PAINLEVEL_OUTOF10: 7

## 2024-03-25 ASSESSMENT — PAIN DESCRIPTION - LOCATION: LOCATION: KNEE

## 2024-03-25 NOTE — DISCHARGE INSTRUCTIONS
You have severe arthritis which is causing your pain. I have given you a referral to orthopedic surgeon (knee doctors). You need to call them to make an appointment for your knee.    I have also given you the number for [facility] which are the spine doctors to address the arthritis in your neck (seen on your CAT scan from 2022) that is likely causing your neck pain.    Also follow up with your nephrologist given your general body aches.

## 2024-03-25 NOTE — ED PROVIDER NOTES
THE [facility]  EMERGENCY DEPARTMENT ENCOUNTER          ATTENDING PHYSICIAN NOTE       Date of evaluation: 3/25/2024    Chief Complaint     Knee Pain (Pt to ED with CC of R knee pain x2 months worsening over the past two weeks.  Pt states the pain worse on bearing weight.)      History of Present Illness     Fozia Boland is a 73 y.o. female who presents with a chief complaint of knee pain.  Patient states she has had knee pain x 2 months, worsening over the past 2 weeks.  Patient states the pain is constant, but is worse with standing up, and she feels like the pain is so severe sometimes that she is worried that her knee is going to break if she puts too much weight on it.  States the pain is in her right knee with radiation down to her toes.  Denies pain in her back but does have some pain in the left side of her neck which is new as well.  Is also concerned on review of systems about her body feeling \" very cold\" for the last few months.  She does state for her knee that she thought that this might be gout and she took her at home colchicine for the last few days but she does not think it is helping.  Has a primary care doctor and a kidney doctor as she has stage IV chronic kidney disease, but does not have an orthopedic doctor for any reason.    ASSESSMENT / PLAN  (MEDICAL DECISION MAKING)     INITIAL VITALS: BP: (!) 178/84, Temp: 98.3 °F (36.8 °C), Pulse: 82, Respirations: 16, SpO2: 99 %      Fozia Boland is a 73 y.o. female who presents with a chief complaint of knee pain.  Initial exam reveals a well-appearing female in no acute distress with hypertension but otherwise normal vitals, afebrile..  Physical exam remarkable for normal exam including normal knee exam, normal neck and back exam.  Normal neurologic exam.    Patient's presentation is most consistent with arthritis.  X-rays of the knee revealed severe arthritis and I reviewed these pictures with the patient who understood that this is

## 2024-03-27 ENCOUNTER — TELEPHONE (OUTPATIENT)
Dept: ORTHOPEDIC SURGERY | Age: 74
End: 2024-03-27

## 2024-04-16 SDOH — HEALTH STABILITY: PHYSICAL HEALTH: ON AVERAGE, HOW MANY DAYS PER WEEK DO YOU ENGAGE IN MODERATE TO STRENUOUS EXERCISE (LIKE A BRISK WALK)?: 0 DAYS

## 2024-04-18 ENCOUNTER — OFFICE VISIT (OUTPATIENT)
Dept: ORTHOPEDIC SURGERY | Age: 74
End: 2024-04-18
Payer: MEDICARE

## 2024-04-18 VITALS — HEIGHT: 60 IN | BODY MASS INDEX: 44.37 KG/M2 | WEIGHT: 226 LBS

## 2024-04-18 DIAGNOSIS — M17.11 PRIMARY OSTEOARTHRITIS OF RIGHT KNEE: ICD-10-CM

## 2024-04-18 DIAGNOSIS — G57.31 NEURALGIA OF RIGHT PERONEAL NERVE: Primary | ICD-10-CM

## 2024-04-18 DIAGNOSIS — M25.561 ACUTE PAIN OF RIGHT KNEE: ICD-10-CM

## 2024-04-18 PROCEDURE — 99204 OFFICE O/P NEW MOD 45 MIN: CPT | Performed by: PHYSICIAN ASSISTANT

## 2024-04-18 PROCEDURE — 1123F ACP DISCUSS/DSCN MKR DOCD: CPT | Performed by: PHYSICIAN ASSISTANT

## 2024-04-18 RX ORDER — LIDOCAINE 50 MG/G
1 PATCH TOPICAL DAILY
Qty: 30 PATCH | Refills: 0 | Status: SHIPPED | OUTPATIENT
Start: 2024-04-18

## 2024-04-18 SDOH — HEALTH STABILITY: PHYSICAL HEALTH: ON AVERAGE, HOW MANY DAYS PER WEEK DO YOU ENGAGE IN MODERATE TO STRENUOUS EXERCISE (LIKE A BRISK WALK)?: 0 DAYS

## 2024-04-23 NOTE — PROGRESS NOTES
Date:  2024    Name:  Fozia Boland  Address:  [facility]  [address] [place] [id number]    :  1950      Age:   73 y.o.        Chief Complaint    Knee Pain (NP R knee)      History of Present Illness:  Fozia Boland is a 73 y.o. female with a past medical history of stage IV chronic kidney disease who presents for evaluation of her right knee pain.  Patient states that she has had knee pain over the past 2 months which is worsened over the past 2 weeks.  She denies any inciting event that she can attribute to this knee pain.  The pain is localized around the lateral joint line and fibular head.  It is worse with prolonged ambulating and weightbearing.  She describes this discomfort as achy and occasionally sharp with pain radiating down the lateral aspect of the shin to the ankle and up the lateral aspect of the right thigh.  She has a history of gout but has taken colchicine over the past week with no relief.  The patient went to the emergency department where she had an unremarkable workup.  She was discharged with lidocaine patches which the patient states has improved her symptoms.  Other conservative treatment has included: rest, ice, elevation, activity modification, and OTC medications as needed. The patient denies any new injury.  The patient denies any catching, giving way, and joint locking.             Past Medical History:   Diagnosis Date    CAD (coronary artery disease)     CAD (coronary artery disease) 10/20/2016    Distal RCA 3.0 x 15 mm Alpine CRISTEL    Diabetes mellitus (HCC)     Essential hypertension     Gout     Hyperlipidemia         Past Surgical History:   Procedure Laterality Date    ANGIOPLASTY      GS    APPENDECTOMY      BREAST BIOPSY       SECTION      CHOLECYSTECTOMY      COLONOSCOPY N/A 2022    COLONOSCOPY POLYPECTOMY SNARE/COLD BIOPSY performed by Luca Hollis MD at [facility] ENDOSCOPY    CT BIOPSY RENAL  2021    CT BIOPSY RENAL

## 2024-05-02 NOTE — TELEPHONE ENCOUNTER
Requested Prescriptions     Pending Prescriptions Disp Refills    Evolocumab (REPATHA SURECLICK) 140 MG/ML SOAJ [Pharmacy Med Name: REPATHA SRCLK 140MG/ML PF AUTO INJ] 2 mL 1     Sig: INJECT 1 PEN UNDER THE SKIN EVERY 14 DAYS            Last Office Visit: 12/14/2023  Next Office Visit: 06/202024

## 2024-05-06 RX ORDER — EVOLOCUMAB 140 MG/ML
INJECTION, SOLUTION SUBCUTANEOUS
Qty: 2 ML | Refills: 1 | Status: SHIPPED | OUTPATIENT
Start: 2024-05-06

## 2024-06-20 ENCOUNTER — OFFICE VISIT (OUTPATIENT)
Dept: CARDIOLOGY CLINIC | Age: 74
End: 2024-06-20
Payer: MEDICARE

## 2024-06-20 VITALS
SYSTOLIC BLOOD PRESSURE: 136 MMHG | HEIGHT: 60 IN | BODY MASS INDEX: 44.14 KG/M2 | DIASTOLIC BLOOD PRESSURE: 68 MMHG | HEART RATE: 91 BPM

## 2024-06-20 DIAGNOSIS — R07.9 CHEST PAIN, UNSPECIFIED TYPE: Primary | ICD-10-CM

## 2024-06-20 PROCEDURE — 93000 ELECTROCARDIOGRAM COMPLETE: CPT | Performed by: INTERNAL MEDICINE

## 2024-06-20 PROCEDURE — 1123F ACP DISCUSS/DSCN MKR DOCD: CPT | Performed by: INTERNAL MEDICINE

## 2024-06-20 PROCEDURE — 99214 OFFICE O/P EST MOD 30 MIN: CPT | Performed by: INTERNAL MEDICINE

## 2024-06-20 PROCEDURE — 3078F DIAST BP <80 MM HG: CPT | Performed by: INTERNAL MEDICINE

## 2024-06-20 PROCEDURE — 3075F SYST BP GE 130 - 139MM HG: CPT | Performed by: INTERNAL MEDICINE

## 2024-06-20 NOTE — PROGRESS NOTES
[facility]   Dr Markus Root MD, [facility]    Outpatient Follow Up Note    2024,12:21 PM  Subjective:   CHIEF COMPLAINT / HPI:  Follow Up secondary to hyperlipidemia and hypertension and coronary disease     Fozia Boland is 74 y.o. female who presents today 2024 for follow-up.  She states that she is feeling well and she is sleeping very well with her CPAP.  No chest pains or shortness of breath or dyspnea.  Last LDL in  was 120.  Creatinine is 3.74 and she recognized that she is slowly slipping into worsening renal failure and is following up closely with her nephrologist.  Uric acid level was 5.2 and the last LDL was 106 and she is on Repatha at 140 mg be every other week       Her daughter Lisette york was in school [employer] with my son Boo    Coronavirus vaccine x2 Moderna and no issues     CAD with previous angioplasty stents.  and   Hyperlipidemia on Repatha     on 24                                                   Intolerant statins lipitor and crestor and pravastatin  Covid 19+ 2020  CKD  III to IV 2.5 Dr Daly  3.74  Past Medical History:                                                          Past Medical History:   Diagnosis Date    CAD (coronary artery disease)     CAD (coronary artery disease) 10/20/2016    Distal RCA 3.0 x 15 mm Alpine CRISTEL    Diabetes mellitus (HCC)     Essential hypertension     Gout     Hyperlipidemia      Past Surgical History  Past Surgical History:   Procedure Laterality Date    ANGIOPLASTY      GS    APPENDECTOMY      BREAST BIOPSY       SECTION      CHOLECYSTECTOMY      COLONOSCOPY N/A 2022    COLONOSCOPY POLYPECTOMY SNARE/COLD BIOPSY performed by Luca Hollis MD at [facility] ENDOSCOPY    CT BIOPSY RENAL  2021    CT BIOPSY RENAL 2021 [facility] CT SCAN    HERNIA REPAIR      UPPER GASTROINTESTINAL ENDOSCOPY N/A 2022    EGD BIOPSY

## 2024-07-08 RX ORDER — EVOLOCUMAB 140 MG/ML
INJECTION, SOLUTION SUBCUTANEOUS
Qty: 2 ML | Refills: 3 | Status: SHIPPED | OUTPATIENT
Start: 2024-07-08

## 2024-07-08 NOTE — TELEPHONE ENCOUNTER
Requested Prescriptions     Pending Prescriptions Disp Refills    REPATHA SURECLICK 140 MG/ML SOAJ [Pharmacy Med Name: REPATHA SRCLK 140MG/ML PF AUTO INJ] 2 mL 3     Sig: INJECT 1 PEN UNDER THE SKIN EVERY 14 DAYS            Checked Correct Pharmacy: Yes    Any changes since last refill? No     Number: 6    Refills: 3    Last Office Visit:06.20.2024  Next Office Visit: 12.10.2024      Last Labs: 04.29.2024

## 2024-08-05 ENCOUNTER — HOSPITAL ENCOUNTER (EMERGENCY)
Age: 74
Discharge: HOME | End: 2024-08-05
Attending: STUDENT IN AN ORGANIZED HEALTH CARE EDUCATION/TRAINING PROGRAM
Payer: MEDICARE

## 2024-08-05 VITALS
OXYGEN SATURATION: 97 % | HEART RATE: 84 BPM | WEIGHT: 220.24 LBS | TEMPERATURE: 98.3 F | RESPIRATION RATE: 18 BRPM | SYSTOLIC BLOOD PRESSURE: 163 MMHG | DIASTOLIC BLOOD PRESSURE: 74 MMHG | HEIGHT: 60 IN | BODY MASS INDEX: 43.24 KG/M2

## 2024-08-05 DIAGNOSIS — J02.9 PHARYNGITIS, UNSPECIFIED ETIOLOGY: Primary | ICD-10-CM

## 2024-08-05 PROCEDURE — 6370000000 HC RX 637 (ALT 250 FOR IP): Performed by: PHYSICIAN ASSISTANT

## 2024-08-05 PROCEDURE — 99283 EMERGENCY DEPT VISIT LOW MDM: CPT

## 2024-08-05 RX ORDER — LIDOCAINE HYDROCHLORIDE 20 MG/ML
15 SOLUTION OROPHARYNGEAL ONCE
Status: COMPLETED | OUTPATIENT
Start: 2024-08-05 | End: 2024-08-05

## 2024-08-05 RX ORDER — LIDOCAINE HYDROCHLORIDE 20 MG/ML
15 SOLUTION OROPHARYNGEAL
Qty: 15 ML | Refills: 0 | Status: SHIPPED | OUTPATIENT
Start: 2024-08-05

## 2024-08-05 RX ORDER — ACETAMINOPHEN 160 MG/5ML
650 LIQUID ORAL ONCE
Status: COMPLETED | OUTPATIENT
Start: 2024-08-05 | End: 2024-08-05

## 2024-08-05 RX ORDER — LIDOCAINE 4 G/G
1 PATCH TOPICAL DAILY
Status: DISCONTINUED | OUTPATIENT
Start: 2024-08-05 | End: 2024-08-05 | Stop reason: HOSPADM

## 2024-08-05 RX ADMIN — LIDOCAINE HYDROCHLORIDE 15 ML: 20 SOLUTION ORAL at 11:32

## 2024-08-05 RX ADMIN — ACETAMINOPHEN 650 MG: 650 SOLUTION ORAL at 11:32

## 2024-08-05 ASSESSMENT — PAIN SCALES - GENERAL
PAINLEVEL_OUTOF10: 8
PAINLEVEL_OUTOF10: 4
PAINLEVEL_OUTOF10: 3

## 2024-08-05 ASSESSMENT — PAIN DESCRIPTION - LOCATION: LOCATION: KNEE

## 2024-08-05 ASSESSMENT — PAIN - FUNCTIONAL ASSESSMENT: PAIN_FUNCTIONAL_ASSESSMENT: 0-10

## 2024-08-05 ASSESSMENT — PAIN DESCRIPTION - ORIENTATION: ORIENTATION: RIGHT

## 2024-08-05 NOTE — DISCHARGE INSTRUCTIONS
You take over-the-counter Tylenol as directed as needed for discomfort.  You can use viscous lidocaine as needed  Return to the emergency department with inability to swallow, fevers, swelling in your face or neck, other concerning symptoms

## 2024-08-05 NOTE — ED PROVIDER NOTES
ED Attending Attestation Note     Date of evaluation: 8/5/2024    This patient was seen by the resident.  I have seen and examined the patient, agree with the workup, evaluation, management and diagnosis. The care plan has been discussed.  My assessment reveals a well-appearing 74-year-old female with history of DM, HTN, CKD and CAD who is presenting with sore throat x 2 days and this morning had difficulty taking her Tylenol and so has increased pain in her knee from her chronic arthritis.  She denies any bulbar symptoms or strokelike symptoms.  Her exam is consistent with a viral pharyngitis but she does not appear toxic and is tolerating liquids    Gen: Alert, awake, non toxic appearing   Head: NCAT  Eyes: PERRL, EOMI  Throat: Mild tonsillar hypertrophy and posterior pharyngeal erythema without exudates.  Full range of motion of neck.  No masses. No Peritonsillar abscess.  Neck: Supple, full ROM  Cardiac: RRR, no murmurs, rubs or gallops  Lungs: No respiratory distress, lungs CTAB  Abd: Soft, non distended, non tender to palpation  Extremities: No deformities, warm and well perfused, strong peripheral pulses  Neuro: A&Ox3, moving all extremities equally, no focal deficits  Psych: Appropriate mood and affect       Daniel Farrell MD  08/05/24 1124    
and there is low suspicion for PTA, other deep space abscess along with her lack of systemic symptoms, being afebrile and having otherwise normal vital signs.  Given all this I feel she is appropriate for discharge home with symptomatic management and return precautions if she is not able to take medications or has symptoms that involve and are suggestive of worsening infection.  She is in agreement with this plan and expresses verbal understanding prior to discharge      This patient was also evaluated by the attending physician. All care plans were discussed and agreed upon.    Clinical Impression     1. Pharyngitis, unspecified etiology        Disposition     PATIENT REFERRED TO:  No follow-up provider specified.    DISCHARGE MEDICATIONS:  New Prescriptions    No medications on file       DISPOSITION         Mansoor Leung, PASherlynC  08/05/24 7211

## 2024-08-21 ENCOUNTER — APPOINTMENT (OUTPATIENT)
Dept: GENERAL RADIOLOGY | Age: 74
End: 2024-08-21
Payer: MEDICARE

## 2024-08-21 ENCOUNTER — HOSPITAL ENCOUNTER (INPATIENT)
Age: 74
LOS: 3 days | Discharge: HOME | End: 2024-08-24
Attending: EMERGENCY MEDICINE | Admitting: INTERNAL MEDICINE
Payer: MEDICARE

## 2024-08-21 DIAGNOSIS — N18.9 ACUTE KIDNEY INJURY SUPERIMPOSED ON CKD (HCC): ICD-10-CM

## 2024-08-21 DIAGNOSIS — K92.2 UPPER GASTROINTESTINAL BLEEDING: ICD-10-CM

## 2024-08-21 DIAGNOSIS — D64.9 SYMPTOMATIC ANEMIA: Primary | ICD-10-CM

## 2024-08-21 DIAGNOSIS — N17.9 ACUTE KIDNEY INJURY SUPERIMPOSED ON CKD (HCC): ICD-10-CM

## 2024-08-21 PROBLEM — D62 ACUTE BLOOD LOSS ANEMIA: Status: ACTIVE | Noted: 2024-08-21

## 2024-08-21 PROBLEM — Z86.79 HISTORY OF CAD (CORONARY ARTERY DISEASE): Status: ACTIVE | Noted: 2024-08-21

## 2024-08-21 PROBLEM — N18.5 CHRONIC KIDNEY DISEASE (CKD), STAGE V (HCC): Status: ACTIVE | Noted: 2024-08-21

## 2024-08-21 PROBLEM — Z86.19 HISTORY OF HELICOBACTER PYLORI INFECTION: Status: ACTIVE | Noted: 2024-08-21

## 2024-08-21 PROBLEM — R79.89 AZOTEMIA: Status: ACTIVE | Noted: 2024-08-21

## 2024-08-21 LAB
ABO + RH BLD: NORMAL
ALBUMIN SERPL-MCNC: 3.6 G/DL (ref 3.4–5)
ALBUMIN/GLOB SERPL: 1.1 {RATIO} (ref 1.1–2.2)
ALP SERPL-CCNC: 67 U/L (ref 40–129)
ALT SERPL-CCNC: 12 U/L (ref 10–40)
ANION GAP SERPL CALCULATED.3IONS-SCNC: 18 MMOL/L (ref 3–16)
AST SERPL-CCNC: 14 U/L (ref 15–37)
BACTERIA URNS QL MICRO: ABNORMAL /HPF
BASE EXCESS BLDV CALC-SCNC: -7.1 MMOL/L (ref -2–3)
BASOPHILS # BLD: 0 K/UL (ref 0–0.2)
BASOPHILS NFR BLD: 0.4 %
BILIRUB SERPL-MCNC: <0.2 MG/DL (ref 0–1)
BILIRUB UR QL STRIP.AUTO: NEGATIVE
BLD GP AB SCN SERPL QL: NORMAL
BLOOD BANK DISPENSE STATUS: NORMAL
BLOOD BANK PRODUCT CODE: NORMAL
BPU ID: NORMAL
BUN SERPL-MCNC: 118 MG/DL (ref 7–20)
CALCIUM SERPL-MCNC: 9.5 MG/DL (ref 8.3–10.6)
CHLORIDE SERPL-SCNC: 101 MMOL/L (ref 99–110)
CLARITY UR: CLEAR
CO2 BLDV-SCNC: 20 MMOL/L
CO2 SERPL-SCNC: 16 MMOL/L (ref 21–32)
COHGB MFR BLDV: 1.1 % (ref 0–1.5)
COLOR UR: YELLOW
CREAT SERPL-MCNC: 2.9 MG/DL (ref 0.6–1.2)
DEPRECATED RDW RBC AUTO: 15.3 % (ref 12.4–15.4)
DESCRIPTION BLOOD BANK: NORMAL
DO-HGB MFR BLDV: 5.6 %
EKG ATRIAL RATE: 106 BPM
EKG DIAGNOSIS: NORMAL
EKG P AXIS: 50 DEGREES
EKG P-R INTERVAL: 170 MS
EKG Q-T INTERVAL: 370 MS
EKG QRS DURATION: 78 MS
EKG QTC CALCULATION (BAZETT): 491 MS
EKG R AXIS: 4 DEGREES
EKG T AXIS: 54 DEGREES
EKG VENTRICULAR RATE: 106 BPM
EOSINOPHIL # BLD: 0.1 K/UL (ref 0–0.6)
EOSINOPHIL NFR BLD: 0.9 %
FLUAV RNA RESP QL NAA+PROBE: NOT DETECTED
FLUBV RNA RESP QL NAA+PROBE: NOT DETECTED
GFR SERPLBLD CREATININE-BSD FMLA CKD-EPI: 16 ML/MIN/{1.73_M2}
GLUCOSE BLD-MCNC: 158 MG/DL (ref 70–99)
GLUCOSE SERPL-MCNC: 188 MG/DL (ref 70–99)
GLUCOSE UR STRIP.AUTO-MCNC: NEGATIVE MG/DL
HCO3 BLDV-SCNC: 19 MMOL/L (ref 24–28)
HCT VFR BLD AUTO: 18.6 % (ref 36–48)
HCT VFR BLD AUTO: 19.2 % (ref 36–48)
HCT VFR BLD AUTO: 22.2 % (ref 36–48)
HGB BLD-MCNC: 5.9 G/DL (ref 12–16)
HGB BLD-MCNC: 6 G/DL (ref 12–16)
HGB BLD-MCNC: 7.5 G/DL (ref 12–16)
HGB UR QL STRIP.AUTO: NEGATIVE
KETONES UR STRIP.AUTO-MCNC: NEGATIVE MG/DL
LACTATE BLDV-SCNC: 1.7 MMOL/L (ref 0.4–2)
LACTATE BLDV-SCNC: 2.6 MMOL/L (ref 0.4–2)
LACTATE BLDV-SCNC: 3 MMOL/L (ref 0.4–2)
LEUKOCYTE ESTERASE UR QL STRIP.AUTO: NEGATIVE
LYMPHOCYTES # BLD: 2 K/UL (ref 1–5.1)
LYMPHOCYTES NFR BLD: 15.8 %
MCH RBC QN AUTO: 30.3 PG (ref 26–34)
MCHC RBC AUTO-ENTMCNC: 31.4 G/DL (ref 31–36)
MCV RBC AUTO: 96.6 FL (ref 80–100)
METHGB MFR BLDV: 0.3 % (ref 0–1.5)
MONOCYTES # BLD: 0.7 K/UL (ref 0–1.3)
MONOCYTES NFR BLD: 5.7 %
NEUTROPHILS # BLD: 9.6 K/UL (ref 1.7–7.7)
NEUTROPHILS NFR BLD: 77.2 %
NITRITE UR QL STRIP.AUTO: NEGATIVE
NT-PROBNP SERPL-MCNC: 77 PG/ML (ref 0–449)
PCO2 BLDV: 40.6 MMHG (ref 41–51)
PERFORMED ON: ABNORMAL
PH BLDV: 7.28 [PH] (ref 7.35–7.45)
PH UR STRIP.AUTO: 6 [PH] (ref 5–8)
PLATELET # BLD AUTO: 332 K/UL (ref 135–450)
PMV BLD AUTO: 7.7 FL (ref 5–10.5)
PO2 BLDV: 117 MMHG (ref 25–40)
POTASSIUM SERPL-SCNC: 5.2 MMOL/L (ref 3.5–5.1)
PROT SERPL-MCNC: 6.8 G/DL (ref 6.4–8.2)
PROT UR STRIP.AUTO-MCNC: NEGATIVE MG/DL
RBC # BLD AUTO: 1.99 M/UL (ref 4–5.2)
RBC #/AREA URNS HPF: ABNORMAL /HPF (ref 0–4)
SAO2 % BLDV: 99 %
SARS-COV-2 RNA RESP QL NAA+PROBE: NOT DETECTED
SODIUM SERPL-SCNC: 135 MMOL/L (ref 136–145)
SP GR UR STRIP.AUTO: 1.01 (ref 1–1.03)
TROPONIN, HIGH SENSITIVITY: 78 NG/L (ref 0–14)
TROPONIN, HIGH SENSITIVITY: 83 NG/L (ref 0–14)
UA DIPSTICK W REFLEX MICRO PNL UR: ABNORMAL
URN SPEC COLLECT METH UR: ABNORMAL
UROBILINOGEN UR STRIP-ACNC: 0.2 E.U./DL
WBC # BLD AUTO: 12.4 K/UL (ref 4–11)
WBC #/AREA URNS HPF: ABNORMAL /HPF (ref 0–5)

## 2024-08-21 PROCEDURE — P9016 RBC LEUKOCYTES REDUCED: HCPCS

## 2024-08-21 PROCEDURE — 30233N1 TRANSFUSION OF NONAUTOLOGOUS RED BLOOD CELLS INTO PERIPHERAL VEIN, PERCUTANEOUS APPROACH: ICD-10-PCS | Performed by: INTERNAL MEDICINE

## 2024-08-21 PROCEDURE — 84484 ASSAY OF TROPONIN QUANT: CPT

## 2024-08-21 PROCEDURE — 86900 BLOOD TYPING SEROLOGIC ABO: CPT

## 2024-08-21 PROCEDURE — 36430 TRANSFUSION BLD/BLD COMPNT: CPT

## 2024-08-21 PROCEDURE — 86923 COMPATIBILITY TEST ELECTRIC: CPT

## 2024-08-21 PROCEDURE — 85014 HEMATOCRIT: CPT

## 2024-08-21 PROCEDURE — 86901 BLOOD TYPING SEROLOGIC RH(D): CPT

## 2024-08-21 PROCEDURE — 83880 ASSAY OF NATRIURETIC PEPTIDE: CPT

## 2024-08-21 PROCEDURE — 6370000000 HC RX 637 (ALT 250 FOR IP)

## 2024-08-21 PROCEDURE — 71045 X-RAY EXAM CHEST 1 VIEW: CPT

## 2024-08-21 PROCEDURE — 96361 HYDRATE IV INFUSION ADD-ON: CPT

## 2024-08-21 PROCEDURE — 96375 TX/PRO/DX INJ NEW DRUG ADDON: CPT

## 2024-08-21 PROCEDURE — 80053 COMPREHEN METABOLIC PANEL: CPT

## 2024-08-21 PROCEDURE — 85025 COMPLETE CBC W/AUTO DIFF WBC: CPT

## 2024-08-21 PROCEDURE — 81001 URINALYSIS AUTO W/SCOPE: CPT

## 2024-08-21 PROCEDURE — 36415 COLL VENOUS BLD VENIPUNCTURE: CPT

## 2024-08-21 PROCEDURE — 2580000003 HC RX 258: Performed by: EMERGENCY MEDICINE

## 2024-08-21 PROCEDURE — 85018 HEMOGLOBIN: CPT

## 2024-08-21 PROCEDURE — 99285 EMERGENCY DEPT VISIT HI MDM: CPT

## 2024-08-21 PROCEDURE — 83605 ASSAY OF LACTIC ACID: CPT

## 2024-08-21 PROCEDURE — 96374 THER/PROPH/DIAG INJ IV PUSH: CPT

## 2024-08-21 PROCEDURE — 6360000002 HC RX W HCPCS: Performed by: EMERGENCY MEDICINE

## 2024-08-21 PROCEDURE — 82803 BLOOD GASES ANY COMBINATION: CPT

## 2024-08-21 PROCEDURE — 2060000000 HC ICU INTERMEDIATE R&B

## 2024-08-21 PROCEDURE — 93005 ELECTROCARDIOGRAM TRACING: CPT | Performed by: EMERGENCY MEDICINE

## 2024-08-21 PROCEDURE — 86850 RBC ANTIBODY SCREEN: CPT

## 2024-08-21 PROCEDURE — 2580000003 HC RX 258

## 2024-08-21 PROCEDURE — 87636 SARSCOV2 & INF A&B AMP PRB: CPT

## 2024-08-21 RX ORDER — INSULIN LISPRO 100 [IU]/ML
0-4 INJECTION, SOLUTION INTRAVENOUS; SUBCUTANEOUS NIGHTLY
Status: DISCONTINUED | OUTPATIENT
Start: 2024-08-21 | End: 2024-08-24 | Stop reason: HOSPADM

## 2024-08-21 RX ORDER — ACETAMINOPHEN 325 MG/1
650 TABLET ORAL EVERY 6 HOURS PRN
Status: DISCONTINUED | OUTPATIENT
Start: 2024-08-21 | End: 2024-08-24 | Stop reason: HOSPADM

## 2024-08-21 RX ORDER — SODIUM CHLORIDE 9 MG/ML
INJECTION, SOLUTION INTRAVENOUS PRN
Status: DISCONTINUED | OUTPATIENT
Start: 2024-08-21 | End: 2024-08-22

## 2024-08-21 RX ORDER — SODIUM BICARBONATE 650 MG/1
1300 TABLET ORAL 2 TIMES DAILY
Status: DISCONTINUED | OUTPATIENT
Start: 2024-08-21 | End: 2024-08-23

## 2024-08-21 RX ORDER — SODIUM CHLORIDE 0.9 % (FLUSH) 0.9 %
5-40 SYRINGE (ML) INJECTION PRN
Status: DISCONTINUED | OUTPATIENT
Start: 2024-08-21 | End: 2024-08-24 | Stop reason: HOSPADM

## 2024-08-21 RX ORDER — SODIUM CHLORIDE, SODIUM LACTATE, POTASSIUM CHLORIDE, AND CALCIUM CHLORIDE .6; .31; .03; .02 G/100ML; G/100ML; G/100ML; G/100ML
1000 INJECTION, SOLUTION INTRAVENOUS ONCE
Status: COMPLETED | OUTPATIENT
Start: 2024-08-21 | End: 2024-08-21

## 2024-08-21 RX ORDER — SODIUM CHLORIDE 9 MG/ML
INJECTION, SOLUTION INTRAVENOUS CONTINUOUS
Status: ACTIVE | OUTPATIENT
Start: 2024-08-21 | End: 2024-08-21

## 2024-08-21 RX ORDER — GLUCAGON 1 MG/ML
1 KIT INJECTION PRN
Status: DISCONTINUED | OUTPATIENT
Start: 2024-08-21 | End: 2024-08-24 | Stop reason: HOSPADM

## 2024-08-21 RX ORDER — ONDANSETRON 2 MG/ML
4 INJECTION INTRAMUSCULAR; INTRAVENOUS ONCE
Status: COMPLETED | OUTPATIENT
Start: 2024-08-21 | End: 2024-08-21

## 2024-08-21 RX ORDER — SODIUM CHLORIDE 0.9 % (FLUSH) 0.9 %
5-40 SYRINGE (ML) INJECTION EVERY 12 HOURS SCHEDULED
Status: DISCONTINUED | OUTPATIENT
Start: 2024-08-21 | End: 2024-08-24 | Stop reason: HOSPADM

## 2024-08-21 RX ORDER — ACETAMINOPHEN 650 MG/1
650 SUPPOSITORY RECTAL EVERY 6 HOURS PRN
Status: DISCONTINUED | OUTPATIENT
Start: 2024-08-21 | End: 2024-08-24 | Stop reason: HOSPADM

## 2024-08-21 RX ORDER — SODIUM CHLORIDE 9 MG/ML
INJECTION, SOLUTION INTRAVENOUS PRN
Status: DISCONTINUED | OUTPATIENT
Start: 2024-08-21 | End: 2024-08-24 | Stop reason: HOSPADM

## 2024-08-21 RX ORDER — ONDANSETRON 2 MG/ML
4 INJECTION INTRAMUSCULAR; INTRAVENOUS EVERY 6 HOURS PRN
Status: DISCONTINUED | OUTPATIENT
Start: 2024-08-21 | End: 2024-08-24 | Stop reason: HOSPADM

## 2024-08-21 RX ORDER — INSULIN LISPRO 100 [IU]/ML
0-4 INJECTION, SOLUTION INTRAVENOUS; SUBCUTANEOUS
Status: DISCONTINUED | OUTPATIENT
Start: 2024-08-21 | End: 2024-08-24 | Stop reason: HOSPADM

## 2024-08-21 RX ORDER — NIFEDIPINE 60 MG/1
60 TABLET, EXTENDED RELEASE ORAL DAILY
Status: DISCONTINUED | OUTPATIENT
Start: 2024-08-21 | End: 2024-08-24 | Stop reason: HOSPADM

## 2024-08-21 RX ORDER — ONDANSETRON 4 MG/1
4 TABLET, ORALLY DISINTEGRATING ORAL EVERY 8 HOURS PRN
Status: DISCONTINUED | OUTPATIENT
Start: 2024-08-21 | End: 2024-08-24 | Stop reason: HOSPADM

## 2024-08-21 RX ORDER — DEXTROSE MONOHYDRATE 100 MG/ML
INJECTION, SOLUTION INTRAVENOUS CONTINUOUS PRN
Status: DISCONTINUED | OUTPATIENT
Start: 2024-08-21 | End: 2024-08-24 | Stop reason: HOSPADM

## 2024-08-21 RX ORDER — HYDRALAZINE HYDROCHLORIDE 50 MG/1
50 TABLET, FILM COATED ORAL 2 TIMES DAILY
Status: DISCONTINUED | OUTPATIENT
Start: 2024-08-21 | End: 2024-08-24 | Stop reason: HOSPADM

## 2024-08-21 RX ORDER — METOPROLOL TARTRATE 25 MG/1
25 TABLET, FILM COATED ORAL 2 TIMES DAILY
Status: DISCONTINUED | OUTPATIENT
Start: 2024-08-21 | End: 2024-08-24 | Stop reason: HOSPADM

## 2024-08-21 RX ORDER — CYCLOBENZAPRINE HCL 10 MG
10 TABLET ORAL 3 TIMES DAILY PRN
Status: DISCONTINUED | OUTPATIENT
Start: 2024-08-21 | End: 2024-08-24 | Stop reason: HOSPADM

## 2024-08-21 RX ADMIN — SODIUM CHLORIDE, POTASSIUM CHLORIDE, SODIUM LACTATE AND CALCIUM CHLORIDE 1000 ML: 600; 310; 30; 20 INJECTION, SOLUTION INTRAVENOUS at 06:17

## 2024-08-21 RX ADMIN — ONDANSETRON 4 MG: 2 INJECTION INTRAMUSCULAR; INTRAVENOUS at 06:16

## 2024-08-21 RX ADMIN — SODIUM BICARBONATE 1300 MG: 650 TABLET ORAL at 20:37

## 2024-08-21 RX ADMIN — HYDRALAZINE HYDROCHLORIDE 50 MG: 50 TABLET ORAL at 20:37

## 2024-08-21 RX ADMIN — PANTOPRAZOLE SODIUM 80 MG: 40 INJECTION, POWDER, FOR SOLUTION INTRAVENOUS at 08:50

## 2024-08-21 RX ADMIN — SODIUM CHLORIDE: 9 INJECTION, SOLUTION INTRAVENOUS at 13:48

## 2024-08-21 RX ADMIN — METOPROLOL TARTRATE 25 MG: 25 TABLET, FILM COATED ORAL at 20:37

## 2024-08-21 RX ADMIN — SODIUM ZIRCONIUM CYCLOSILICATE 10 G: 10 POWDER, FOR SUSPENSION ORAL at 20:37

## 2024-08-21 RX ADMIN — PANTOPRAZOLE SODIUM 8 MG/HR: 40 INJECTION, POWDER, FOR SOLUTION INTRAVENOUS at 12:31

## 2024-08-21 ASSESSMENT — LIFESTYLE VARIABLES
HOW OFTEN DO YOU HAVE A DRINK CONTAINING ALCOHOL: NEVER
HOW MANY STANDARD DRINKS CONTAINING ALCOHOL DO YOU HAVE ON A TYPICAL DAY: PATIENT DOES NOT DRINK

## 2024-08-21 ASSESSMENT — ENCOUNTER SYMPTOMS
SHORTNESS OF BREATH: 1
TROUBLE SWALLOWING: 0
COUGH: 0
BACK PAIN: 0
ABDOMINAL PAIN: 0
RHINORRHEA: 0
WHEEZING: 0
EYE PAIN: 0
SHORTNESS OF BREATH: 0
EYE REDNESS: 0

## 2024-08-21 ASSESSMENT — PAIN - FUNCTIONAL ASSESSMENT: PAIN_FUNCTIONAL_ASSESSMENT: NONE - DENIES PAIN

## 2024-08-21 NOTE — CONSULTS
[facility]  GI Consultation                                                                 Patient: Fozia Boland  : 1950       Date:  2024    Subjective:       History of Present Illness  Patient is a 74 y.o.  female admitted with Upper gastrointestinal bleeding [K92.2]  Symptomatic anemia [D64.9] who is seen in consult for hematemesis and melena.  74-year-old female with history of coronary artery disease status post PCI on aspirin and Brilinta, CKD, hypertension, type 2 diabetes mellitus, gout who presented to the emergency department after waking early this morning with a sense of nausea and lightheadedness.  She states she had an episode of dark brown emesis as well as a black-colored stool.  She notes that her stools are chronically black due to oral iron intake.  She has had no recurrent hematemesis or melenic stools throughout the day today.  She endorses mild nausea at this time.    Chronic anemia with most recent hemoglobin 9.8 2024.  She was evaluated by Dr. Hollis  for melena and hematochezia with evaluation showin/2022 EGD: Normal duodenum, atrophic and nodular gastritis, nonobstructive Schatzki ring    2022 colonoscopy: 3 mm mid sigmoid polyp, moderate left and right-sided diverticulosis, small internal hemorrhoid    2022: Video capsule endoscopy: Multiple areas of erosions/small ulcerations versus atypical AVMs with no active bleeding in the mid small bowel.      Past Medical History:   Diagnosis Date    CAD (coronary artery disease)     CAD (coronary artery disease) 10/20/2016    Distal RCA 3.0 x 15 mm Alpine CRISTEL    Diabetes mellitus (HCC)     Essential hypertension     Gout     Hyperlipidemia       Past Surgical History:   Procedure Laterality Date    ANGIOPLASTY      GS    APPENDECTOMY      BREAST BIOPSY       SECTION      CHOLECYSTECTOMY      COLONOSCOPY N/A 2022    COLONOSCOPY POLYPECTOMY SNARE/COLD  5.2*      CO2 16*   *   CREATININE 2.9*     Recent Labs     08/21/24  0612   AST 14*   ALT 12   BILITOT <0.2   ALKPHOS 67     No results for input(s): \"LIPASE\", \"AMYLASE\" in the last 72 hours.  No results for input(s): \"PROTIME\", \"INR\" in the last 72 hours.  Invalid input(s): \"PTT\"  No results for input(s): \"OCCULTBLD\" in the last 72 hours.    Imaging Studies:                           CT Result (most recent):  CT ENTEROGRAPHY WO CONTRAST 11/19/2022    Narrative  INDICATION: GI bleed of uncertain etiology.    COMPARISON: CT pelvis 5/21/2022    TECHNIQUE: Helically acquired axial unenhanced images were obtained through the abdomen and pelvis with multiplanar reformats. Up-to-date CT equipment and radiation dose reduction techniques were employed.    IV Contrast: None  Oral Contrast: Volumen    FINDINGS:    LUNG BASES: Partial imaging normal heart size. Coronary artery calcification consistent with coronary artery disease. Linear scarring or subsegmental atelectasis in the right lower lobe.    LIVER: Normal.    GALLBLADDER AND BILIARY TREE: Previous cholecystectomy.  No intra- or extrahepatic biliary dilatation.    PANCREAS: Normal.    SPLEEN: Normal.    ADRENAL GLANDS: Normal.    KIDNEYS AND URETERS: Nonobstructing calculus mid to lower pole left kidney measures 6 mm. No other calculi. Bilateral water attenuation renal cysts. No hydronephrosis.    URINARY BLADDER: Normal.    REPRODUCTIVE ORGANS: No associated masses.    BOWEL: Normal diameter, nonobstructed.  Appendix not directly visualized. No acute inflammatory process in the right lower quadrant. No evidence of bowel obstruction. Mild diverticulosis without evidence of diverticulitis.    LYMPH NODES: No abnormally enlarged nodes.    PERITONEUM/RETROPERITONEUM: No ascites or free air.    VESSELS: Aorta normal in caliber.    ABDOMINAL WALL: There is a moderate fat-containing periumbilical hernia. There is an immediately adjacent smaller fat-containing  periumbilical hernia just superior to the umbilicus. No herniated bowel loops.    BONES: No acute abnormality.    Impression  1. No evidence of bowel obstruction.      2. No acute findings in the abdomen or pelvis.                   Assessment:     Principal Problem:    Symptomatic anemia  Active Problems:    History of Helicobacter pylori infection    Acute blood loss anemia    Chronic kidney disease (CKD), stage V (HCC)    Azotemia    History of CAD (coronary artery disease)  Resolved Problems:    * No resolved hospital problems. *      74-year-old female with history of coronary artery disease status post PCI on Brilinta and aspirin, CKD, hypertension, type 2 diabetes mellitus presenting with abrupt onset of hematemesis and possible melena.  Chronic history of anemia and had prior episode of GI bleeding 2022 with negative EGD, colonoscopy, and video capsule endoscopy for clear cause of bleeding/anemia.  Suspected diverticular hemorrhage versus bleeding from small bowel ulceration/erosions.  She was noted to have H. pylori at time of assessment and was treated with tetracycline/Flagyl/omeprazole/Pepto-Bismol with no test of cure identified on chart review.  Hemodynamically stable at this time save for mild tachycardia.  Hemoglobin still less than 7 after 1 unit packed red blood cells with second transfusion.  No signs of progressive GI bleeding noted throughout the day today.  Presence of hematemesis and elevated BUN to creatinine ratio most suggestive of upper GI source.  Considerations to include peptic ulcer, AVM, neoplasia.     Recommendations:     -Continue to follow serial H&H and transfuse for hemoglobin less than 7  -Agree with pantoprazole drip  -Hold aspirin and Brilinta  -Ensure 2 large-bore IVs  -Clear liquid diet  -Plan for EGD tomorrow once hemoglobin greater than 7, sooner if needed for unstable GI bleeding    Thank you for the opportunity to participate in Fozia Boland's care.    Darryn Harrison

## 2024-08-21 NOTE — ED PROVIDER NOTES
THE [facility]  EMERGENCY DEPARTMENT ENCOUNTER          ATTENDING PHYSICIAN NOTE       Date of evaluation: 8/21/2024    ADDENDUM:      Care of this patient was assumed from Dr. Deluna.  The patient was seen for Dizziness and Shortness of Breath (Patient presents to ED with report of dizziness, shortness of breath, vomiting X 1 episode for one day.)  .  The patient's initial evaluation and plan have been discussed with the prior provider who initially evaluated the patient.  Nursing Notes, Past Medical Hx, Past Surgical Hx, Social Hx, Allergies, and Family Hx were all reviewed.    ASSESSMENT / PLAN  (MEDICAL DECISION MAKING)     Fozia Boland is a 74 y.o. female  with a past medical history that includes coronary artery disease with remote stent placement, diabetes, hypertension, and other medical comorbidities presenting today with 1 day of malaise and fatigue, now with lightheadedness and vomiting.  The patient's workup revealed a hemoglobin of 6 and a white blood cell count of 12.4 with a disproportionately high BUN of 118 suggesting that she may have an upper GI bleed.  Upon further questioning, the patient noted that her vomit yesterday had a coffee grounds appearance and her stool has been dark in color but the latter has been an issue since she has been on iron and it has not really changed.  Based on these findings, concerned that she may have an upper GI bleed.  Blood transfusion has been ordered as well as Protonix bolus and drip.  The patient is otherwise hemodynamically stable and I feel can be admitted to telemetry but GI would need to be consulted for potential further investigation with EGD.  Mild lactic acidosis noted and repeat is pending but do not suspect sepsis at this time given absence of infection.  Troponin is slightly elevated as well the clinical significance of which is not clear at this time given no EKG changes.  This may be related to chronic kidney disease.  The patient received  IV fluids as well as medication for nausea and currently the pantoprazole as ordered, but she is feeling improved.    Medical Decision Making  Amount and/or Complexity of Data Reviewed  Labs: ordered.  Radiology: ordered.  ECG/medicine tests: ordered.    Risk  Prescription drug management.  Decision regarding hospitalization.        Clinical Impression     1. Symptomatic anemia    2. Upper gastrointestinal bleeding        Disposition     DISPOSITION Decision To Admit 08/21/2024 07:42:25 AM  Condition at Disposition: Stable        Diagnostic Results and Other Data                                   RADIOLOGY:  XR CHEST PORTABLE   Final Result   Stable mild cardiomegaly.      No acute radiographic abnormality of the chest.      Electronically signed by Vicenta Smith DO          LABS:   Results for orders placed or performed during the hospital encounter of 08/21/24   COVID-19 & Influenza Combo    Specimen: Nasopharyngeal Swab   Result Value Ref Range    SARS-CoV-2 RNA, RT PCR NOT DETECTED NOT DETECTED    Influenza A NOT DETECTED NOT DETECTED    Influenza B NOT DETECTED NOT DETECTED   CBC with Auto Differential   Result Value Ref Range    WBC 12.4 (H) 4.0 - 11.0 K/uL    RBC 1.99 (L) 4.00 - 5.20 M/uL    Hemoglobin 6.0 (LL) 12.0 - 16.0 g/dL    Hematocrit 19.2 (LL) 36.0 - 48.0 %    MCV 96.6 80.0 - 100.0 fL    MCH 30.3 26.0 - 34.0 pg    MCHC 31.4 31.0 - 36.0 g/dL    RDW 15.3 12.4 - 15.4 %    Platelets 332 135 - 450 K/uL    MPV 7.7 5.0 - 10.5 fL    Neutrophils % 77.2 %    Lymphocytes % 15.8 %    Monocytes % 5.7 %    Eosinophils % 0.9 %    Basophils % 0.4 %    Neutrophils Absolute 9.6 (H) 1.7 - 7.7 K/uL    Lymphocytes Absolute 2.0 1.0 - 5.1 K/uL    Monocytes Absolute 0.7 0.0 - 1.3 K/uL    Eosinophils Absolute 0.1 0.0 - 0.6 K/uL    Basophils Absolute 0.0 0.0 - 0.2 K/uL   Comprehensive Metabolic Panel w/ Reflex to MG   Result Value Ref Range    Sodium 135 (L) 136 - 145 mmol/L    Potassium reflex Magnesium 5.2 (H) 3.5 - 5.1 mmol/L     Chloride 101 99 - 110 mmol/L    CO2 16 (L) 21 - 32 mmol/L    Anion Gap 18 (H) 3 - 16    Glucose 188 (H) 70 - 99 mg/dL     (HH) 7 - 20 mg/dL    Creatinine 2.9 (H) 0.6 - 1.2 mg/dL    Est, Glom Filt Rate 16 (A) >60    Calcium 9.5 8.3 - 10.6 mg/dL    Total Protein 6.8 6.4 - 8.2 g/dL    Albumin 3.6 3.4 - 5.0 g/dL    Albumin/Globulin Ratio 1.1 1.1 - 2.2    Total Bilirubin <0.2 0.0 - 1.0 mg/dL    Alkaline Phosphatase 67 40 - 129 U/L    ALT 12 10 - 40 U/L    AST 14 (L) 15 - 37 U/L   Blood gas, venous (Lab)   Result Value Ref Range    pH, Rudy 7.279 (L) 7.350 - 7.450    pCO2, Rudy 40.6 (L) 41.0 - 51.0 mmHg    PO2, Rudy 117.0 (H) 25.0 - 40.0 mmHg    HCO3, Venous 19.0 (L) 24.0 - 28.0 mmol/L    Base Excess, Rudy -7.1 (L) -2.0 - 3.0 mmol/L    O2 Sat, Rudy 99 Not established %    Carboxyhemoglobin 1.1 0.0 - 1.5 %    MetHgb, Rudy 0.3 0.0 - 1.5 %    TC02 (Calc), Rudy 20 mmol/L    Hemoglobin, Rudy, Reduced 5.60 %   Lactic Acid   Result Value Ref Range    Lactic Acid 2.6 (H) 0.4 - 2.0 mmol/L   Brain Natriuretic Peptide   Result Value Ref Range    Pro-BNP 77 0 - 449 pg/mL   Troponin   Result Value Ref Range    Troponin, High Sensitivity 83 (H) 0 - 14 ng/L   Urinalysis with Microscopic   Result Value Ref Range    Urine Type NotGiven    EKG 12 Lead   Result Value Ref Range    Ventricular Rate 106 BPM    Atrial Rate 106 BPM    P-R Interval 170 ms    QRS Duration 78 ms    Q-T Interval 370 ms    QTc Calculation (Bazett) 491 ms    P Axis 50 degrees    R Axis 4 degrees    T Axis 54 degrees    Diagnosis       Sinus tachycardiaAnterior infarct , age undeterminedAbnormal ECGEKG performed in ER and to be interpreted by ER physician.Confirmed by MD, ER ([id number]),  PRAMOD MARS ([id number]) on 8/21/2024 5:59:56 AM       MOST RECENT VITALS:  BP: (!) 157/74, Temp: 98.2 °F (36.8 °C), Pulse: 99, Respirations: 21, SpO2: 97 %     ED Course          The patient was given the following medications:  Orders Placed This Encounter   Medications    lactated

## 2024-08-21 NOTE — PROGRESS NOTES
Patient hemoglobin resulted 5.9. she is currently receiving a unit of blood with no complication at this time. Consent verified    Electronically signed by Kristina Osullivan RN on 8/21/2024 at 6:01 PM

## 2024-08-21 NOTE — H&P
Internal Medicine H&P    Date: 2024   Patient: Fozia Boland   Patient : 1950     CC: Dizziness and Shortness of Breath (Patient presents to ED with report of dizziness, shortness of breath, vomiting X 1 episode for one day.)       Subjective     HPI: Ms. Fozia Boland is a 75 yo F. PMH of CAD with 2 stent placements, HTN, diabetes, gout, HLD, Stage V CKD. Presented to [facility] ED with lightheadedness, fatigue, SOB and a single episode of vomiting. She states she started to present with fatigue last evening. She went to bed and when she woke up she was lightheaded and short of breath with exertion. She denies chest pain. She states just prior to arrival at the ED she had an episode of dark colored vomit. Her last BM was 2 days ago. Last colonoscopy was in 2022.     She has a history of vertigo. She denies headache or visual changes.   She is a never smoker and does not drink alcohol.     ED course  Vitals: Temp 98.2F, HR 99, /74, RR 21, O2: 97% room air  Labs: CMP: Na 135, K 5.2, CO2 16, , Cr 2.9, Anion gap 18, GFR 16, Lactic acid 2.6, Glucose 188, Trop 83  CBC:WBC 12.4, Hemoglobin 6.0, Platelets 332  VBG: pH 7.279, pCO2 40.6, pO2 117.0  UA: unremarkable  Covid & Flu A/B: negative   Imaging CXR No acute radiographic abnormality  Given Protonix IV drip, 1 unit packed RBCs, Zofran      PMHx:      Diagnosis Date    CAD (coronary artery disease)     CAD (coronary artery disease) 10/20/2016    Distal RCA 3.0 x 15 mm Alpine CRISTEL    Diabetes mellitus (HCC)     Essential hypertension     Gout     Hyperlipidemia        PSurgHx:      Procedure Laterality Date    ANGIOPLASTY      GS    APPENDECTOMY      BREAST BIOPSY       SECTION      CHOLECYSTECTOMY      COLONOSCOPY N/A 2022    COLONOSCOPY POLYPECTOMY SNARE/COLD BIOPSY performed by Luca Hollis MD at [facility] ENDOSCOPY    CT BIOPSY RENAL  2021    CT BIOPSY RENAL 2021 [facility] CT SCAN    HERNIA REPAIR      UPPER

## 2024-08-21 NOTE — PROGRESS NOTES
Patient arrived from ED to PCU rm [id number]. She is A&OX4, VSS. Protonix gtt infusing. Safety precautions in place. 4 eye skin assessment completed.    Electronically signed by Kristina Osullivan RN on 8/21/2024 at 1:39 PM

## 2024-08-21 NOTE — CONSULTS
Ph: [phone], Fax: [phone]           [facility]               Reason for admission:    Dizziness and SOB                 Brief Summary :   Fozia Boland is being seen by nephrology for CKD V most likely 2/2 DM nephropathy. She is an established patient and follows with Dr. Daly outpatient.      Assessment and plan:   Mrs. Fozia Boland is a 74-year-old female with a past medical history of coronary disease status post stenting, diabetes X > 10 years, hypertension presented with a constellation of symptoms that were rapid in onset including lightheadedness, severe fatigue, weakness, shortness of breath, and vomiting. As per the patient she started feeling particularly poorly yesterday during the day and felt overall weak but there were no focalizing symptoms. When she woke up this morning she felt much more lightheaded and she felt that if she stood up she passed out. She also felt significantly weaker and somewhat short of breath and had an episode of vomiting on her way to the hospital. She denies any abdominal pain at this time. As per the patient she has not had a bowel movement for the past 2 days. She denies any upper respiratory symptoms, cough, chest pain, fevers, chills, and/or urinary symptoms. She does have a history of vertigo but she feels like this is more of a lightheadedness versus the spinning sensation that she usually feels. Her workup revealed a hemoglobin of 6 and a WBC of 12.4 with a disproportionately high BUN of 118 which may suggest an upper GI bleed. Upon further questioning the patient said that her vomit yesterday had a coffee-ground appearance and her stool has been dark in color. The dark stool could be attributed to her as she says she has had this since she has been taking iron supplements. A blood transfusion was ordered in the ED as well as Protonix bolus and drip. She is hemodynamically stable and was admitted for further workup by GI. There was  MD.      PMH/PSH/SH/Family History:     Past Medical History:   Diagnosis Date    CAD (coronary artery disease)     CAD (coronary artery disease) 10/20/2016    Distal RCA 3.0 x 15 mm Alpine CRISTEL    Diabetes mellitus (HCC)     Essential hypertension     Gout     Hyperlipidemia           Medication:     Current Facility-Administered Medications: 0.9 % sodium chloride infusion, , IntraVENous, PRN  pantoprazole (PROTONIX) 80 mg in sodium chloride 0.9 % 100 mL infusion, 8 mg/hr, IntraVENous, Continuous    Vitals :     Vitals:    08/21/24 0900   BP: (!) 156/78   Pulse: (!) 102   Resp: 15   Temp:    SpO2: 99%          Physical Examination :     appearance: Alert, orientated  Respiratory: no distress  Cardiovascular: no visibly  raised JVD, trace pitting edema.  Abdomen: -  soft  Other relevant findings: none     Labs :     CBC:   Recent Labs     08/21/24  0612   WBC 12.4*   HGB 6.0*   HCT 19.2*        BMP:    Recent Labs     08/21/24 0612   *   K 5.2*      CO2 16*   *   CREATININE 2.9*   GLUCOSE 188*     Lab Results   Component Value Date/Time    COLORU Yellow 08/21/2024 08:05 AM    NITRU Negative 08/21/2024 08:05 AM    GLUCOSEU Negative 08/21/2024 08:05 AM    KETUA Negative 08/21/2024 08:05 AM    UROBILINOGEN 0.2 08/21/2024 08:05 AM    BILIRUBINUR Negative 08/21/2024 08:05 AM        ----------------------------------------------------------  Please call with questions at      24 Hrs Answering service [phone]  Perfect serve, or cell phone 7 am - 5pm  Grant Daly MD   [email or web address]    The patient has been staffed and discussed with Dr. Grant Daly MD.    Saw Ch MD  PGY-2, Internal Medicine    Patient was seen and examined and the case was discussed with the resident. He acted as my scribe. I agree with the assessment and plan.    Thanks  Nephrology  [address] [place] [address] [id number]  [place] [address]  Office: [phone]  Cell: [phone]  Fax: [phone]

## 2024-08-21 NOTE — CONSULTS
Consult received.  Labs and notes were reviewed.  Case was discussed with the staff.    Full note to follow.    Thanks  Nephrology  [address] [place] [address] [id number]  [place] [address]  Office: [phone]  Cell: [phone]  Fax: [phone]

## 2024-08-21 NOTE — CONSENT
Informed Consent for Blood Component Transfusion Note    I have discussed with the patient the rationale for blood component transfusion; its benefits in treating or preventing fatigue, organ damage, or death; and its risk which includes mild transfusion reactions, rare risk of blood borne infection, or more serious but rare reactions. I have discussed the alternatives to transfusion, including the risk and consequences of not receiving transfusion. The patient had an opportunity to ask questions and had agreed to proceed with transfusion of blood components.    Electronically signed by Mikael Pena MD on 8/21/24 at 8:14 AM EDT

## 2024-08-21 NOTE — ED PROVIDER NOTES
THE [facility]  EMERGENCY DEPARTMENT ENCOUNTER          ATTENDING PHYSICIAN NOTE       Date of evaluation: 8/21/2024    Chief Complaint     Dizziness and Shortness of Breath (Patient presents to ED with report of dizziness, shortness of breath, vomiting X 1 episode for one day.)      History of Present Illness     Fozia Boland is a 74 y.o. female with a past medical history that includes coronary artery disease with remote stent placement, diabetes, hypertension, and other medical comorbidities.  She presents to the emergency department with fairly rapid onset of a constellation of symptoms that includes/lightheadedness, severe fatigue, weakness, shortness of breath, vomiting.  The patient states that she started feeling poorly during the day yesterday, primarily feeling vaguely woozy and weak, although without focalizing symptoms.  At this morning she feels much more lightheaded, feels that if she stands up she may pass out, feels significantly weak, somewhat short of breath, and had a bout of vomiting on her way into the hospital.  Denies any significant abdominal pain.  Had what is described as a good bowel movement 2 days ago, on Monday, August 19, but has not had a significant bowel movement since that time.  Patient denies any URI symptoms or cough, chest pain, fevers or chills, urinary symptoms.  Denies any known sick contacts.  Patient does state that she has a history of vertigo, but that feels more like a spinning sensation, and this feels more like lightheadedness, as though she might pass out.  Denies any headache, visual changes, numbness or weakness of her extremities, difficulty speaking or swallowing.    Review of Systems     Review of Systems   All other systems reviewed and are negative.      Past Medical, Surgical, Family, and Social History     She has a past medical history of CAD (coronary artery disease), CAD (coronary artery disease), Diabetes mellitus (HCC), Essential  Value Ref Range    Ventricular Rate 106 BPM    Atrial Rate 106 BPM    P-R Interval 170 ms    QRS Duration 78 ms    Q-T Interval 370 ms    QTc Calculation (Bazett) 491 ms    P Axis 50 degrees    R Axis 4 degrees    T Axis 54 degrees    Diagnosis       Sinus tachycardiaAnterior infarct , age undeterminedAbnormal ECGEKG performed in ER and to be interpreted by ER physician.Confirmed by MD, ER ([id number]),  PRAMOD MARS ([id number]) on 8/21/2024 5:59:56 AM       RECENT VITALS:  BP: 112/65, Temp: 98.2 °F (36.8 °C), Pulse: (!) 101, Respirations: 20, SpO2: 98 %     Procedures       ED Course     Nursing Notes, Past Medical Hx, Past Surgical Hx, Social Hx, Allergies, and Family Hx were reviewed.    The patient was given the following medications:  Orders Placed This Encounter   Medications    lactated ringers bolus 1,000 mL    ondansetron (ZOFRAN) injection 4 mg       CONSULTS:  None    MEDICAL DECISION MAKING / ASSESSMENT / PLAN     Fozia Boland is a 74 y.o. female with a history of coronary artery disease and chronic kidney disease, as well as other medical comorbidities, who presents to the emergency department feeling well over the weekend, but with relatively rapid onset yesterday of generalized fatigue, malaise, wooziness, presyncopal sensations, and development today of vomiting, without significant abdominal pain, fevers, or diarrhea.  The patient states her last good bowel movement was on Monday, August 19, and she has not had a significant bowel movement since then.  She denies chest pain, but does feel short of breath.  Denies coughing.    Overall, the patient is quite uncomfortable appearing, mildly tachycardic, mildly tachypneic, but otherwise with normal vital signs.  She does not have any obvious focalizing sources of infection at this time.  She does not have any focal neurologic deficits.  A fairly broad cardiac, metabolic, infectious workup was initiated.  Her EKG is unchanged from previous, shows no

## 2024-08-21 NOTE — ED NOTES
ED TO INPATIENT SBAR HANDOFF    Patient Name: Fozia Boland   :  1950  74 y.o.   MRN:  [id number]  Preferred Name  Fozia Boland  ED Room #:  A01/A01-01  Family/Caregiver Present yes   Restraints no   Sitter no   Sepsis Risk Score      Situation  Code Status: Prior No additional code details.    Allergies: Crestor [rosuvastatin calcium] and Statins  Weight: No data found.  Arrived from: home  Chief Complaint:   Chief Complaint   Patient presents with    Dizziness    Shortness of Breath     Patient presents to ED with report of dizziness, shortness of breath, vomiting X 1 episode for one day.     Hospital Problem/Diagnosis:  Principal Problem:    Symptomatic anemia  Resolved Problems:    * No resolved hospital problems. *    Imaging:   XR CHEST PORTABLE   Final Result   Stable mild cardiomegaly.      No acute radiographic abnormality of the chest.      Electronically signed by Vicenta Smith DO        Abnormal labs:   Abnormal Labs Reviewed   CBC WITH AUTO DIFFERENTIAL - Abnormal; Notable for the following components:       Result Value    WBC 12.4 (*)     RBC 1.99 (*)     Hemoglobin 6.0 (*)     Hematocrit 19.2 (*)     Neutrophils Absolute 9.6 (*)     All other components within normal limits    Narrative:     CALL  Morin  Roller tel. [phone],  Hematology results called to and read back by Nadine Piña, 2024  06:46, by ANN   COMPREHENSIVE METABOLIC PANEL W/ REFLEX TO MG FOR LOW K - Abnormal; Notable for the following components:    Sodium 135 (*)     Potassium reflex Magnesium 5.2 (*)     CO2 16 (*)     Anion Gap 18 (*)     Glucose 188 (*)      (*)     Creatinine 2.9 (*)     Est, Glom Filt Rate 16 (*)     AST 14 (*)     All other components within normal limits    Narrative:     CALL  Morin  Roller tel. [phone],  Chemistry results called to and read back by rodrigo cordero, 2024 07:47,  by [facility]   BLOOD GAS, VENOUS - Abnormal; Notable for the following components:    pH, Rudy 7.279 (*)

## 2024-08-22 ENCOUNTER — ANESTHESIA (OUTPATIENT)
Dept: ENDOSCOPY | Age: 74
End: 2024-08-22
Payer: MEDICARE

## 2024-08-22 ENCOUNTER — ANESTHESIA EVENT (OUTPATIENT)
Dept: ENDOSCOPY | Age: 74
End: 2024-08-22
Payer: MEDICARE

## 2024-08-22 LAB
ANION GAP SERPL CALCULATED.3IONS-SCNC: 11 MMOL/L (ref 3–16)
APTT BLD: 26.4 SEC (ref 22.1–36.4)
BLOOD BANK DISPENSE STATUS: NORMAL
BLOOD BANK PRODUCT CODE: NORMAL
BPU ID: NORMAL
BUN SERPL-MCNC: 111 MG/DL (ref 7–20)
CALCIUM SERPL-MCNC: 8.8 MG/DL (ref 8.3–10.6)
CHLORIDE SERPL-SCNC: 105 MMOL/L (ref 99–110)
CO2 SERPL-SCNC: 21 MMOL/L (ref 21–32)
CREAT SERPL-MCNC: 2.9 MG/DL (ref 0.6–1.2)
DESCRIPTION BLOOD BANK: NORMAL
GFR SERPLBLD CREATININE-BSD FMLA CKD-EPI: 16 ML/MIN/{1.73_M2}
GLUCOSE BLD-MCNC: 112 MG/DL (ref 70–99)
GLUCOSE BLD-MCNC: 112 MG/DL (ref 70–99)
GLUCOSE BLD-MCNC: 123 MG/DL (ref 70–99)
GLUCOSE BLD-MCNC: 128 MG/DL (ref 70–99)
GLUCOSE BLD-MCNC: 129 MG/DL (ref 70–99)
GLUCOSE SERPL-MCNC: 124 MG/DL (ref 70–99)
HCT VFR BLD AUTO: 19.7 % (ref 36–48)
HCT VFR BLD AUTO: 21.6 % (ref 36–48)
HCT VFR BLD AUTO: 21.8 % (ref 36–48)
HGB BLD-MCNC: 6.5 G/DL (ref 12–16)
HGB BLD-MCNC: 7.1 G/DL (ref 12–16)
HGB BLD-MCNC: 7.2 G/DL (ref 12–16)
INR PPP: 1.21 (ref 0.85–1.15)
IRON SATN MFR SERPL: 80 % (ref 15–50)
IRON SERPL-MCNC: 188 UG/DL (ref 37–145)
PERFORMED ON: ABNORMAL
POTASSIUM SERPL-SCNC: 5.3 MMOL/L (ref 3.5–5.1)
PROTHROMBIN TIME: 15.5 SEC (ref 11.9–14.9)
REASON FOR REJECTION: NORMAL
REJECTED TEST: NORMAL
SODIUM SERPL-SCNC: 137 MMOL/L (ref 136–145)
TIBC SERPL-MCNC: 234 UG/DL (ref 260–445)

## 2024-08-22 PROCEDURE — 2720000010 HC SURG SUPPLY STERILE: Performed by: INTERNAL MEDICINE

## 2024-08-22 PROCEDURE — 80048 BASIC METABOLIC PNL TOTAL CA: CPT

## 2024-08-22 PROCEDURE — 2060000000 HC ICU INTERMEDIATE R&B

## 2024-08-22 PROCEDURE — 36415 COLL VENOUS BLD VENIPUNCTURE: CPT

## 2024-08-22 PROCEDURE — 85014 HEMATOCRIT: CPT

## 2024-08-22 PROCEDURE — 6360000002 HC RX W HCPCS

## 2024-08-22 PROCEDURE — 6360000002 HC RX W HCPCS: Performed by: INTERNAL MEDICINE

## 2024-08-22 PROCEDURE — 83540 ASSAY OF IRON: CPT

## 2024-08-22 PROCEDURE — 6370000000 HC RX 637 (ALT 250 FOR IP)

## 2024-08-22 PROCEDURE — 3609013000 HC EGD TRANSORAL CONTROL BLEEDING ANY METHOD: Performed by: INTERNAL MEDICINE

## 2024-08-22 PROCEDURE — 2709999900 HC NON-CHARGEABLE SUPPLY: Performed by: INTERNAL MEDICINE

## 2024-08-22 PROCEDURE — 3700000000 HC ANESTHESIA ATTENDED CARE: Performed by: INTERNAL MEDICINE

## 2024-08-22 PROCEDURE — 2580000003 HC RX 258

## 2024-08-22 PROCEDURE — 7100000011 HC PHASE II RECOVERY - ADDTL 15 MIN: Performed by: INTERNAL MEDICINE

## 2024-08-22 PROCEDURE — 7100000010 HC PHASE II RECOVERY - FIRST 15 MIN: Performed by: INTERNAL MEDICINE

## 2024-08-22 PROCEDURE — 83550 IRON BINDING TEST: CPT

## 2024-08-22 PROCEDURE — 3700000001 HC ADD 15 MINUTES (ANESTHESIA): Performed by: INTERNAL MEDICINE

## 2024-08-22 PROCEDURE — 85730 THROMBOPLASTIN TIME PARTIAL: CPT

## 2024-08-22 PROCEDURE — 2500000003 HC RX 250 WO HCPCS

## 2024-08-22 PROCEDURE — 85018 HEMOGLOBIN: CPT

## 2024-08-22 PROCEDURE — 2580000003 HC RX 258: Performed by: INTERNAL MEDICINE

## 2024-08-22 PROCEDURE — 0W3P8ZZ CONTROL BLEEDING IN GASTROINTESTINAL TRACT, VIA NATURAL OR ARTIFICIAL OPENING ENDOSCOPIC: ICD-10-PCS | Performed by: INTERNAL MEDICINE

## 2024-08-22 PROCEDURE — 85610 PROTHROMBIN TIME: CPT

## 2024-08-22 PROCEDURE — 36430 TRANSFUSION BLD/BLD COMPNT: CPT

## 2024-08-22 RX ORDER — LIDOCAINE HYDROCHLORIDE 20 MG/ML
INJECTION, SOLUTION INFILTRATION; PERINEURAL PRN
Status: DISCONTINUED | OUTPATIENT
Start: 2024-08-22 | End: 2024-08-22 | Stop reason: SDUPTHER

## 2024-08-22 RX ORDER — GLYCOPYRROLATE 0.2 MG/ML
INJECTION INTRAMUSCULAR; INTRAVENOUS PRN
Status: DISCONTINUED | OUTPATIENT
Start: 2024-08-22 | End: 2024-08-22 | Stop reason: SDUPTHER

## 2024-08-22 RX ORDER — PROPOFOL 10 MG/ML
INJECTION, EMULSION INTRAVENOUS PRN
Status: DISCONTINUED | OUTPATIENT
Start: 2024-08-22 | End: 2024-08-22 | Stop reason: SDUPTHER

## 2024-08-22 RX ORDER — SODIUM CHLORIDE 9 MG/ML
INJECTION, SOLUTION INTRAVENOUS PRN
Status: DISCONTINUED | OUTPATIENT
Start: 2024-08-22 | End: 2024-08-24 | Stop reason: HOSPADM

## 2024-08-22 RX ORDER — EPINEPHRINE 1 MG/ML
INJECTION, SOLUTION, CONCENTRATE INTRAVENOUS PRN
Status: DISCONTINUED | OUTPATIENT
Start: 2024-08-22 | End: 2024-08-22 | Stop reason: ALTCHOICE

## 2024-08-22 RX ORDER — SODIUM CHLORIDE, SODIUM LACTATE, POTASSIUM CHLORIDE, CALCIUM CHLORIDE 600; 310; 30; 20 MG/100ML; MG/100ML; MG/100ML; MG/100ML
INJECTION, SOLUTION INTRAVENOUS CONTINUOUS
Status: DISCONTINUED | OUTPATIENT
Start: 2024-08-22 | End: 2024-08-23

## 2024-08-22 RX ADMIN — NIFEDIPINE 60 MG: 60 TABLET, EXTENDED RELEASE ORAL at 07:54

## 2024-08-22 RX ADMIN — PROPOFOL 200 MCG/KG/MIN: 10 INJECTION, EMULSION INTRAVENOUS at 13:00

## 2024-08-22 RX ADMIN — SODIUM CHLORIDE, POTASSIUM CHLORIDE, SODIUM LACTATE AND CALCIUM CHLORIDE: 600; 310; 30; 20 INJECTION, SOLUTION INTRAVENOUS at 10:10

## 2024-08-22 RX ADMIN — ACETAMINOPHEN 650 MG: 325 TABLET ORAL at 14:45

## 2024-08-22 RX ADMIN — HYDRALAZINE HYDROCHLORIDE 50 MG: 50 TABLET ORAL at 07:54

## 2024-08-22 RX ADMIN — SODIUM CHLORIDE, PRESERVATIVE FREE 10 ML: 5 INJECTION INTRAVENOUS at 21:25

## 2024-08-22 RX ADMIN — LIDOCAINE HYDROCHLORIDE 100 MG: 20 INJECTION, SOLUTION INFILTRATION; PERINEURAL at 12:59

## 2024-08-22 RX ADMIN — PANTOPRAZOLE SODIUM 8 MG/HR: 40 INJECTION, POWDER, FOR SOLUTION INTRAVENOUS at 14:49

## 2024-08-22 RX ADMIN — PROPOFOL 50 MG: 10 INJECTION, EMULSION INTRAVENOUS at 12:59

## 2024-08-22 RX ADMIN — SODIUM CHLORIDE, POTASSIUM CHLORIDE, SODIUM LACTATE AND CALCIUM CHLORIDE: 600; 310; 30; 20 INJECTION, SOLUTION INTRAVENOUS at 16:47

## 2024-08-22 RX ADMIN — SODIUM BICARBONATE 1300 MG: 650 TABLET ORAL at 07:54

## 2024-08-22 RX ADMIN — SODIUM CHLORIDE, PRESERVATIVE FREE 10 ML: 5 INJECTION INTRAVENOUS at 07:54

## 2024-08-22 RX ADMIN — SODIUM BICARBONATE 1300 MG: 650 TABLET ORAL at 21:24

## 2024-08-22 RX ADMIN — PHENYLEPHRINE HYDROCHLORIDE 100 MCG: 10 INJECTION, SOLUTION INTRAMUSCULAR; INTRAVENOUS; SUBCUTANEOUS at 13:20

## 2024-08-22 RX ADMIN — GLYCOPYRROLATE 0.2 MG: 0.2 INJECTION INTRAMUSCULAR; INTRAVENOUS at 12:59

## 2024-08-22 RX ADMIN — METOPROLOL TARTRATE 25 MG: 25 TABLET, FILM COATED ORAL at 07:54

## 2024-08-22 ASSESSMENT — PAIN - FUNCTIONAL ASSESSMENT
PAIN_FUNCTIONAL_ASSESSMENT: WONG-BAKER FACES
PAIN_FUNCTIONAL_ASSESSMENT: NONE - DENIES PAIN
PAIN_FUNCTIONAL_ASSESSMENT: ACTIVITIES ARE NOT PREVENTED

## 2024-08-22 ASSESSMENT — ENCOUNTER SYMPTOMS
ABDOMINAL DISTENTION: 0
ANAL BLEEDING: 0
CONSTIPATION: 0
ALLERGIC/IMMUNOLOGIC NEGATIVE: 1
DIARRHEA: 0
EYES NEGATIVE: 1
BLOOD IN STOOL: 1
RESPIRATORY NEGATIVE: 1
ABDOMINAL PAIN: 0

## 2024-08-22 ASSESSMENT — PAIN DESCRIPTION - DESCRIPTORS: DESCRIPTORS: DISCOMFORT;ACHING

## 2024-08-22 ASSESSMENT — PAIN SCALES - GENERAL
PAINLEVEL_OUTOF10: 0
PAINLEVEL_OUTOF10: 4

## 2024-08-22 ASSESSMENT — PAIN DESCRIPTION - LOCATION: LOCATION: CHEST

## 2024-08-22 ASSESSMENT — PAIN DESCRIPTION - ORIENTATION: ORIENTATION: RIGHT

## 2024-08-22 NOTE — H&P
Gastroenterology Note             Pre-operative History and Physical    Patient: Fozia Boland  : 1950  CSN: [id number]    History Obtained From:  Patient and/or guardian.     HISTORY OF PRESENT ILLNESS:    Indication: The patient is a 74 y.o. female  here for EGD.  Hematemesis and melena.     Past Medical History:    Past Medical History:   Diagnosis Date    CAD (coronary artery disease)     CAD (coronary artery disease) 10/20/2016    Distal RCA 3.0 x 15 mm Alpine CRISTEL    Diabetes mellitus (HCC)     Essential hypertension     Gout     Hyperlipidemia      Past Surgical History:    Past Surgical History:   Procedure Laterality Date    ANGIOPLASTY      GS    APPENDECTOMY      BREAST BIOPSY       SECTION      CHOLECYSTECTOMY      COLONOSCOPY N/A 2022    COLONOSCOPY POLYPECTOMY SNARE/COLD BIOPSY performed by Luca Hollis MD at [facility] ENDOSCOPY    CT BIOPSY RENAL  2021    CT BIOPSY RENAL 2021 [facility] CT SCAN    HERNIA REPAIR      UPPER GASTROINTESTINAL ENDOSCOPY N/A 2022    EGD BIOPSY performed by Luca Hollis MD at [facility] ENDOSCOPY     Medications Prior to Admission:   No current facility-administered medications on file prior to encounter.     Current Outpatient Medications on File Prior to Encounter   Medication Sig Dispense Refill    lidocaine viscous hcl (XYLOCAINE) 2 % SOLN solution Take 15 mLs by mouth every 3 hours as needed for Irritation 15 mL 0    REPATHA SURECLICK 140 MG/ML SOAJ INJECT 1 PEN UNDER THE SKIN EVERY 14 DAYS 2 mL 3    lidocaine (LIDODERM) 5 % Place 1 patch onto the skin daily 12 hours on, 12 hours off. 30 patch 0    ticagrelor (BRILINTA) 60 MG TABS tablet TAKE 1 TABLET TWICE A DAY (NEED TO MAKE A FOLLOW UP APPOINTMENT) 60 tablet 0    Acetaminophen 325 MG CAPS Take by mouth      aspirin 81 MG EC tablet Take 1 tablet by mouth daily 30 tablet 3    sodium bicarbonate 650 MG tablet Take 2 tablets by mouth 2 times daily 60 tablet 0    NIFEdipine  (PROCARDIA XL) 60 MG extended release tablet Take 1 tablet by mouth daily 30 tablet 3    allopurinol (ZYLOPRIM) 100 MG tablet       sodium zirconium cyclosilicate (LOKELMA) 10 g PACK oral suspension Take 1 packet by mouth Every Monday, Wednesday, and Friday      cyclobenzaprine (FLEXERIL) 10 MG tablet Take 1 tablet by mouth 3 times daily as needed for Muscle spasms 15 tablet 0    metoprolol tartrate (LOPRESSOR) 25 MG tablet Take 1 tablet by mouth 2 times daily      hydrALAZINE (APRESOLINE) 50 MG tablet Take 1 tablet by mouth 2 times daily      sertraline (ZOLOFT) 50 MG tablet Take 1 tablet by mouth daily      vitamin D (CHOLECALCIFEROL) 25 MCG (1000 UT) TABS tablet Take 1 tablet by mouth daily      glipiZIDE (GLUCOTROL XL) 2.5 MG extended release tablet Take 1 tablet by mouth 2 times daily (with meals) (Patient not taking: Reported on 8/21/2024)          Allergies:  Statins      Social History:   Social History     Tobacco Use    Smoking status: Never    Smokeless tobacco: Never   Substance Use Topics    Alcohol use: No     Family History:   No family history on file.    PHYSICAL EXAM:      /63   Pulse 84   Temp 97.4 °F (36.3 °C) (Tympanic)   Resp 16   Ht 1.524 m (5')   Wt 99.8 kg (220 lb)   LMP 08/05/2002   SpO2 95%   BMI 42.97 kg/m²  I        Heart:   RRR, normal s1s2    Lungs:  CTA bilat,  Normal effort    Abdomen:   NT, ND      ASA Grade:  ASA 3 - Patient with moderate systemic disease with functional limitations    Mallampati Class: 3          ASSESSMENT AND PLAN:    1.  Patient is a 74 y.o. female here for EGD with MAC.   2.  Procedure options, risks, and benefits reviewed with patient who expresses understanding and consent.    Darryn Olvera MD,   [facility]  8/22/2024

## 2024-08-22 NOTE — PROGRESS NOTES
Ph: [phone], Fax: [phone]           "[facility]               Reason for admission:    Dizziness and SOB                 Brief Summary :   Fozia Boland is being seen by nephrology for CKD V       Assessment and plan:     BP is better controlled  Urine is not quantified   Labs reviewed     Very high BUN is from GI bleed  Creatinine is close to baseline   Stable hemoglobin     GI consult reviewed and there is a plan for EGD. She had GI work up in 2022  Monitor renal daily and give Lokelma as needed  Continue IVF  Continue current antihypertensives            1.  Chronic Kidney Disease: She is stage V CKD. It is likely from DM nephropathy .She has retinal detachment. In the labs current creatinine is 2.9 with a BUN of 118 (previously was 45) and GFR of 16 mL/minute.  - Daily RFP, Mg, strict I's & O's, daily weights    Ultrasound of the kidney (not done during this stay): Renal ultrasound showed bilateral 12 cm kidney with no hydronephrosis. She had a small left renal cyst  Urological procedures: none  Biopsy:  Diabetic nephropathy with 70 % IF  Proteinuria: She has 0.31 g per day of proteinuria improved from 3.8 grams / day   Hepatitis profile is non reactive  Complements are normal.     2.  HTN/Fluid over load:Hypertension is controlled. BP so far has been elevated. She is on Hydralazine ,Lopressor and Nifedipine XL 60 mg.    3.  Bone and hyperparathyroidism : PTH is in range for CKD (lab work prior to admission)  PTH: 76  Vitamin D 25 OH: 113  Bicarbonate : 24. continue bicarbonate BID     4.  Acute on chronic CARIDAD: most likely 2/2 UGIB.   Ferrokinetics: iron saturation is 12 % with ferritin of 33.        5. Lipids: Cholesterol is 88 with an LDL of 26. She is on Evolocumab.      6. Uric acid :  Serum uric acid was 5.7(outpatient). She is on  allopurinol 100 mg QD.     7. hyperkalemia : She is on Lokelma Three times weekly with low K diet.     Silvino FosterApol[facility] would like to thank

## 2024-08-22 NOTE — PROGRESS NOTES
08/21/24  0612   PROBNP 77       Lipids: No results for input(s): \"CHOL\", \"TRIG\", \"HDL\", \"VLDL\" in the last 72 hours.    Invalid input(s): \"LDLCALC\"      ABGs:  No results for input(s): \"PHART\", \"PBK9PVQ\", \"PO2ART\", \"VXH2POC\", \"BEART\", \"THGBART\", \"K2QMODDC\", \"QYH0RYB\" in the last 72 hours.    VBGs:   Recent Labs     08/21/24 0612   PHVEN 7.279*   IVG4EBK 40.6*   PO2VEN 117.0*       INR:   Recent Labs     08/22/24 0444   INR 1.21*     aPTT:   Recent Labs     08/22/24 0444   APTT 26.4       Procalcitonin: No results for input(s): \"PROCAL\" in the last 72 hours.  CRP: No results for input(s): \"CRP\" in the last 72 hours.  ESR: No results for input(s): \"SEDRATE\" in the last 72 hours.      Microbiology:  Results       Procedure Component Value Units Date/Time    COVID-19 & Influenza Combo [[id number]] Collected: 08/21/24 0620    Order Status: Completed Specimen: Nasopharyngeal Swab Updated: 08/21/24 0702     SARS-CoV-2 RNA, RT PCR NOT DETECTED     Comment: Not Detected results do not preclude SARS-CoV-2 infection and  should not be used as the sole basis for patient management  decisions.  Results must be combined with clinical observations,  patient history, and epidemiological information.  Testing was performed using PAIGE JOSSELIN SARS-CoV-2 and Influenza A/B  nucleic acid assay. This test is a multiplex Real-Time Reverse  Transcriptase Polymerase Chain Reaction (RT-PCR)-based in vitro  diagnostic test intended for the qualitative detection of nucleic  acids from SARS-CoV-2, influenza A, and influenza B in nasopharyngeal  and nasal swab specimens for use under the FDA’s Emergency Use  Authorization (EUA) only.    Patient Fact Sheet:  https://www.fda.gov/media/326015/download  Provider Fact Sheet: https://www.fda.gov/media/868347/download  EUA: https://www.fda.gov/media/917422/download  IFU: https://www.fda.gov/media/693092/download    Methodology:  RT-PCR          Influenza A NOT DETECTED     Influenza B NOT  DETECTED               Radiology:  XR CHEST PORTABLE   Final Result   Stable mild cardiomegaly.      No acute radiographic abnormality of the chest.      Electronically signed by Vicenta Smith,             Assessment & Plan     Patient is a 73 yo F. PMH of CAD with 2 stent placements, CKD IV, HTN, diabetes, gout, HLD. She presented with lightheadedness, 1 episode of vomiting, SOB and fatigue.     #Lightheadedness and vomiting  Suspected 2/2 to upper GI bleed. Hemoglobin at admission was 6.0. Patient reports history of hemorrhoids. Chronic black stool due to oral iron. Prior history of H. Pylori positive biopsy and was treated with tetracycline/Flagyl/omeprazole/Pepto-Bismol with no test of cure. On aspirin and Brilinta prior to admission. She had a similar admission in 2022 with negative EGD, colonoscopy, and video capsule endoscopy for clear cause of bleeding/anemia.   - EGD 8/22 : \"active bleeding was noted from the mid gastric body along the greater curvature.  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  Given active Brilinta/ASA this was treated with 3.5mL of diluted epinephrine and a single hemostatic clip rather than thermal therapy to avoid treatment related ulceration.  \"    - Keep on protonix drip and IV LR at 100cc/hr  - Currently showing stable hb at 7.2 and hemodynamically stable . Asymptomatic   - Q6 H&H and transfuse if Hb <7 or hemodynamically unstable   - Holding aspirin and Brilinta    #Anion gap metabolic acidosis   Likely secondary to demand ischemia and potential GI bleed.   - last LA level 1.7  - Anion gap closed 8/22      #Normocytic anemia  Likely 2/2 to GI bleed. Systolic murmur noted. Hemoglobin was 6.0 at admission. Her baseline is around 7.5. She received 3 units of blood in the ED   - Curently holding her oral Iron supplements   - Hb stable at 7.2 post transfusion   - Echo to be discussed.        #CKD stage V (diabetic nephropathy)  Last visit was 8/1/2024 with nephro. Patient says  she is not on hemodialysis yet.  - Nephro consulted  - BUN severely elevated at admission 118 suggesting upper GI bleed  - Cr was 2.9 at admission -> 2.9  - elevated troponins related to CKD. No ischemic changes on ECG     #Hyperparathyroidism  - Continue home NaCO2     #Hyperkalemia  - Home lokelma three times weekly with low K diet      #Hypertension  - Home hydralazine, nifedipine, and metoprolol    Hx DM2   - HBA1c level was was 5 on 6/28/2024. Previously > 7. No reported wt loss. She stopped all her DM meds.      DVT PPx: SCDs  Diet:  Diet NPO   Code status:  Full Code     ELOS:  Barriers to discharge:  Disposition  - Preadmission: Home  - Current: 4303  - Upon discharge: TBD      Will discuss with attending physician Elsi Angelo MD.     ABDULKADIR MACKAY MD, PGY-1  Internal Medicine Resident  Contact via [employer]

## 2024-08-22 NOTE — PLAN OF CARE
Problem: Safety - Adult  Goal: Free from fall injury  Outcome: Progressing  Flowsheets (Taken 8/22/2024 1616)  Free From Fall Injury:   Instruct family/caregiver on patient safety   Based on caregiver fall risk screen, instruct family/caregiver to ask for assistance with transferring infant if caregiver noted to have fall risk factors     Problem: Pain  Goal: Verbalizes/displays adequate comfort level or baseline comfort level  Outcome: Progressing  Flowsheets (Taken 8/22/2024 1616)  Verbalizes/displays adequate comfort level or baseline comfort level:   Encourage patient to monitor pain and request assistance   Administer analgesics based on type and severity of pain and evaluate response   Consider cultural and social influences on pain and pain management   Assess pain using appropriate pain scale   Implement non-pharmacological measures as appropriate and evaluate response   Notify Licensed Independent Practitioner if interventions unsuccessful or patient reports new pain

## 2024-08-22 NOTE — ANESTHESIA POSTPROCEDURE EVALUATION
Department of Anesthesiology  Postprocedure Note    Patient: Fozia Boland  MRN: [id number]  YOB: 1950  Date of evaluation: 8/22/2024    Procedure Summary       Date: 08/22/24 Room / Location: [place] [address] / [facility]    Anesthesia Start: 1251 Anesthesia Stop: 1341    Procedure: ESOPHAGOGASTRODUODENOSCOPY CONTROL HEMORRHAGE Diagnosis:       Hematemesis, unspecified whether nausea present      (Hematemesis, unspecified whether nausea present [K92.0])    Surgeons: Darryn Olvera MD Responsible Provider: Remigio Smith MD    Anesthesia Type: MAC ASA Status: 3            Anesthesia Type: No value filed.    Richard Phase I: Richard Score: 10    Richard Phase II: Richard Score: 10    Anesthesia Post Evaluation    Patient location during evaluation: PACU  Patient participation: complete - patient participated  Level of consciousness: awake  Pain score: 0  Airway patency: patent  Nausea & Vomiting: no nausea  Cardiovascular status: hemodynamically stable  Respiratory status: acceptable  Hydration status: stable  Pain management: adequate    No notable events documented.

## 2024-08-22 NOTE — OP NOTE
Endoscopy Note    Patient: Fozia Boland  : 1950  CSN:     Procedure: Esophagogastroduodenoscopy with control of bleeding.     Date:  2024     Surgeon:  Darryn Olvera MD     Referring Physician: Shima Vizcarra MD     Preoperative Diagnosis: Melena, hematemesis, acute blood loss anemia    Postoperative Diagnosis:  Per impressions below    Anesthesia:  MAC per anesthesia record     EBL: <5 mL    Indications: This is a 74 y.o. year old female who presents today with abrupt onset of hematemesis and melena.  Acute blood loss anemia.  History of ASA and Brilinta use.       Procedure:  Informed consent was obtained from the patient after explanation of indications, benefits and possible risks and complications of the procedure.  The patient was then taken to the endoscopy suite, placed in the left lateral decubitus position, and the above IV sedation was administrered.    The Olympus gastroscope was passed through the hypopharynx into the esophagus. The scope was advanced to the second portion of the duodenum.  The mucosa was carefully examined, including gastric retroflexion. The scope was withdrawn and the procedure was terminated with findings as indicated below:     Findings:   Esophagus:   -The esophagus was normal.    Stomach:   -A large blood clot occupied the fundus.  Fresh appearing blood was noted at the distal aspect of the clot in the mid gastric body.  Clot was partially cleared using suction, irrigation, and cold snare.  -Upon clearance of the blood clot, active bleeding was noted from the mid gastric body along the greater curvature.  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  Given active Brilinta/ASA this was treated with 3.5mL of diluted epinephrine and a single hemostatic clip rather than thermal therapy to avoid treatment related ulceration.  Complete hemostasis was achieved.    -The stomach was otherwise normal though views, particularly of the proximal body

## 2024-08-22 NOTE — ANESTHESIA PRE PROCEDURE
Date/Time    WBC 12.4 08/21/2024 06:12 AM    RBC 1.99 08/21/2024 06:12 AM    HGB 7.1 08/22/2024 10:12 AM    HCT 21.6 08/22/2024 10:12 AM    MCV 96.6 08/21/2024 06:12 AM    RDW 15.3 08/21/2024 06:12 AM     08/21/2024 06:12 AM       CMP:   Lab Results   Component Value Date/Time     08/22/2024 04:44 AM    K 5.3 08/22/2024 04:44 AM     08/22/2024 04:44 AM    CO2 21 08/22/2024 04:44 AM     08/22/2024 04:44 AM    CREATININE 2.9 08/22/2024 04:44 AM    GFRAA 18 02/17/2022 10:19 AM    AGRATIO 1.1 08/21/2024 06:12 AM    LABGLOM 16 08/22/2024 04:44 AM    LABGLOM 15 11/21/2022 09:49 AM    GLUCOSE 124 08/22/2024 04:44 AM    CALCIUM 8.8 08/22/2024 04:44 AM    BILITOT <0.2 08/21/2024 06:12 AM    ALKPHOS 67 08/21/2024 06:12 AM    AST 14 08/21/2024 06:12 AM    ALT 12 08/21/2024 06:12 AM       POC Tests:   Recent Labs     08/22/24  1147   POCGLU 123*       Coags:   Lab Results   Component Value Date/Time    PROTIME 15.5 08/22/2024 04:44 AM    INR 1.21 08/22/2024 04:44 AM    APTT 26.4 08/22/2024 04:44 AM       HCG (If Applicable): No results found for: \"PREGTESTUR\", \"PREGSERUM\", \"HCG\", \"HCGQUANT\"     ABGs: No results found for: \"PHART\", \"PO2ART\", \"WWT8ZFK\", \"GXG7TIY\", \"BEART\", \"B6NYYZCQ\"     Type & Screen (If Applicable):  No results found for: \"LABABO\"    Drug/Infectious Status (If Applicable):  No results found for: \"HIV\", \"HEPCAB\"    COVID-19 Screening (If Applicable):   Lab Results   Component Value Date/Time    COVID19 NOT DETECTED 08/21/2024 06:20 AM    COVID19 DETECTED 11/19/2020 11:48 AM           Anesthesia Evaluation  Patient summary reviewed and Nursing notes reviewed   no history of anesthetic complications:   Airway: Mallampati: III  TM distance: >3 FB   Neck ROM: full  Mouth opening: > = 3 FB   Dental: normal exam         Pulmonary:normal exam    (+)     sleep apnea: on CPAP,                                  Cardiovascular:  Exercise tolerance: no interval change  (+) hypertension:, angina: no

## 2024-08-23 LAB
ALBUMIN SERPL-MCNC: 3.2 G/DL (ref 3.4–5)
ANION GAP SERPL CALCULATED.3IONS-SCNC: 9 MMOL/L (ref 3–16)
BASOPHILS # BLD: 0 K/UL (ref 0–0.2)
BASOPHILS NFR BLD: 0.3 %
BLOOD BANK DISPENSE STATUS: NORMAL
BLOOD BANK PRODUCT CODE: NORMAL
BPU ID: NORMAL
BUN SERPL-MCNC: 104 MG/DL (ref 7–20)
CALCIUM SERPL-MCNC: 9 MG/DL (ref 8.3–10.6)
CHLORIDE SERPL-SCNC: 107 MMOL/L (ref 99–110)
CO2 SERPL-SCNC: 23 MMOL/L (ref 21–32)
CREAT SERPL-MCNC: 3.2 MG/DL (ref 0.6–1.2)
DEPRECATED RDW RBC AUTO: 15.7 % (ref 12.4–15.4)
DESCRIPTION BLOOD BANK: NORMAL
EOSINOPHIL # BLD: 0.1 K/UL (ref 0–0.6)
EOSINOPHIL NFR BLD: 1.4 %
GFR SERPLBLD CREATININE-BSD FMLA CKD-EPI: 15 ML/MIN/{1.73_M2}
GLUCOSE BLD-MCNC: 110 MG/DL (ref 70–99)
GLUCOSE BLD-MCNC: 115 MG/DL (ref 70–99)
GLUCOSE BLD-MCNC: 126 MG/DL (ref 70–99)
GLUCOSE BLD-MCNC: 132 MG/DL (ref 70–99)
GLUCOSE SERPL-MCNC: 107 MG/DL (ref 70–99)
HCT VFR BLD AUTO: 20.3 % (ref 36–48)
HCT VFR BLD AUTO: 24.9 % (ref 36–48)
HGB BLD-MCNC: 6.9 G/DL (ref 12–16)
HGB BLD-MCNC: 8.5 G/DL (ref 12–16)
LYMPHOCYTES # BLD: 1.6 K/UL (ref 1–5.1)
LYMPHOCYTES NFR BLD: 19.3 %
MAGNESIUM SERPL-MCNC: 1.8 MG/DL (ref 1.8–2.4)
MCH RBC QN AUTO: 31.1 PG (ref 26–34)
MCHC RBC AUTO-ENTMCNC: 34.1 G/DL (ref 31–36)
MCV RBC AUTO: 91.3 FL (ref 80–100)
MONOCYTES # BLD: 0.7 K/UL (ref 0–1.3)
MONOCYTES NFR BLD: 8.1 %
NEUTROPHILS # BLD: 5.8 K/UL (ref 1.7–7.7)
NEUTROPHILS NFR BLD: 70.9 %
PERFORMED ON: ABNORMAL
PHOSPHATE SERPL-MCNC: 2.7 MG/DL (ref 2.5–4.9)
PLATELET # BLD AUTO: 184 K/UL (ref 135–450)
PMV BLD AUTO: 7.3 FL (ref 5–10.5)
POTASSIUM SERPL-SCNC: 4.7 MMOL/L (ref 3.5–5.1)
RBC # BLD AUTO: 2.23 M/UL (ref 4–5.2)
SODIUM SERPL-SCNC: 139 MMOL/L (ref 136–145)
WBC # BLD AUTO: 8.1 K/UL (ref 4–11)

## 2024-08-23 PROCEDURE — 2060000000 HC ICU INTERMEDIATE R&B

## 2024-08-23 PROCEDURE — 36430 TRANSFUSION BLD/BLD COMPNT: CPT

## 2024-08-23 PROCEDURE — 36410 VNPNXR 3YR/> PHY/QHP DX/THER: CPT

## 2024-08-23 PROCEDURE — 05HA33Z INSERTION OF INFUSION DEVICE INTO LEFT BRACHIAL VEIN, PERCUTANEOUS APPROACH: ICD-10-PCS | Performed by: INTERNAL MEDICINE

## 2024-08-23 PROCEDURE — 80069 RENAL FUNCTION PANEL: CPT

## 2024-08-23 PROCEDURE — 2500000003 HC RX 250 WO HCPCS: Performed by: INTERNAL MEDICINE

## 2024-08-23 PROCEDURE — 85014 HEMATOCRIT: CPT

## 2024-08-23 PROCEDURE — 6370000000 HC RX 637 (ALT 250 FOR IP)

## 2024-08-23 PROCEDURE — 6370000000 HC RX 637 (ALT 250 FOR IP): Performed by: INTERNAL MEDICINE

## 2024-08-23 PROCEDURE — C1751 CATH, INF, PER/CENT/MIDLINE: HCPCS

## 2024-08-23 PROCEDURE — 85018 HEMOGLOBIN: CPT

## 2024-08-23 PROCEDURE — 2580000003 HC RX 258

## 2024-08-23 PROCEDURE — 2580000003 HC RX 258: Performed by: INTERNAL MEDICINE

## 2024-08-23 PROCEDURE — 6360000002 HC RX W HCPCS

## 2024-08-23 PROCEDURE — 83735 ASSAY OF MAGNESIUM: CPT

## 2024-08-23 PROCEDURE — 85025 COMPLETE CBC W/AUTO DIFF WBC: CPT

## 2024-08-23 PROCEDURE — 36415 COLL VENOUS BLD VENIPUNCTURE: CPT

## 2024-08-23 RX ORDER — LIDOCAINE HYDROCHLORIDE 20 MG/ML
100 INJECTION, SOLUTION INTRAVENOUS ONCE
Status: DISCONTINUED | OUTPATIENT
Start: 2024-08-23 | End: 2024-08-23

## 2024-08-23 RX ORDER — LIDOCAINE HYDROCHLORIDE 10 MG/ML
50 INJECTION, SOLUTION EPIDURAL; INFILTRATION; INTRACAUDAL; PERINEURAL ONCE
Status: CANCELLED | OUTPATIENT
Start: 2024-08-23 | End: 2024-08-23

## 2024-08-23 RX ORDER — SODIUM CHLORIDE 0.9 % (FLUSH) 0.9 %
5-40 SYRINGE (ML) INJECTION PRN
Status: CANCELLED | OUTPATIENT
Start: 2024-08-23

## 2024-08-23 RX ORDER — SODIUM CHLORIDE 9 MG/ML
INJECTION, SOLUTION INTRAVENOUS PRN
Status: DISCONTINUED | OUTPATIENT
Start: 2024-08-23 | End: 2024-08-24 | Stop reason: HOSPADM

## 2024-08-23 RX ORDER — LIDOCAINE HYDROCHLORIDE 10 MG/ML
5 INJECTION, SOLUTION EPIDURAL; INFILTRATION; INTRACAUDAL; PERINEURAL ONCE
Status: COMPLETED | OUTPATIENT
Start: 2024-08-23 | End: 2024-08-23

## 2024-08-23 RX ORDER — SODIUM BICARBONATE 650 MG/1
650 TABLET ORAL 2 TIMES DAILY
Status: DISCONTINUED | OUTPATIENT
Start: 2024-08-23 | End: 2024-08-24 | Stop reason: HOSPADM

## 2024-08-23 RX ORDER — SODIUM CHLORIDE 0.9 % (FLUSH) 0.9 %
5-40 SYRINGE (ML) INJECTION EVERY 12 HOURS SCHEDULED
Status: DISCONTINUED | OUTPATIENT
Start: 2024-08-23 | End: 2024-08-24 | Stop reason: HOSPADM

## 2024-08-23 RX ORDER — SODIUM CHLORIDE 0.9 % (FLUSH) 0.9 %
5-40 SYRINGE (ML) INJECTION EVERY 12 HOURS SCHEDULED
Status: CANCELLED | OUTPATIENT
Start: 2024-08-23

## 2024-08-23 RX ORDER — SODIUM CHLORIDE 0.9 % (FLUSH) 0.9 %
5-40 SYRINGE (ML) INJECTION PRN
Status: DISCONTINUED | OUTPATIENT
Start: 2024-08-23 | End: 2024-08-24 | Stop reason: HOSPADM

## 2024-08-23 RX ORDER — LIDOCAINE HYDROCHLORIDE 10 MG/ML
50 INJECTION, SOLUTION EPIDURAL; INFILTRATION; INTRACAUDAL; PERINEURAL ONCE
Status: DISCONTINUED | OUTPATIENT
Start: 2024-08-23 | End: 2024-08-24 | Stop reason: HOSPADM

## 2024-08-23 RX ORDER — SODIUM CHLORIDE 9 MG/ML
INJECTION, SOLUTION INTRAVENOUS PRN
Status: CANCELLED | OUTPATIENT
Start: 2024-08-23

## 2024-08-23 RX ADMIN — METOPROLOL TARTRATE 25 MG: 25 TABLET, FILM COATED ORAL at 21:24

## 2024-08-23 RX ADMIN — HYDRALAZINE HYDROCHLORIDE 50 MG: 50 TABLET ORAL at 07:50

## 2024-08-23 RX ADMIN — SODIUM CHLORIDE, PRESERVATIVE FREE 10 ML: 5 INJECTION INTRAVENOUS at 07:47

## 2024-08-23 RX ADMIN — LIDOCAINE HYDROCHLORIDE 5 ML: 10 INJECTION, SOLUTION EPIDURAL; INFILTRATION; INTRACAUDAL; PERINEURAL at 09:15

## 2024-08-23 RX ADMIN — METOPROLOL TARTRATE 25 MG: 25 TABLET, FILM COATED ORAL at 07:50

## 2024-08-23 RX ADMIN — PANTOPRAZOLE SODIUM 8 MG/HR: 40 INJECTION, POWDER, FOR SOLUTION INTRAVENOUS at 05:06

## 2024-08-23 RX ADMIN — PANTOPRAZOLE SODIUM 8 MG/HR: 40 INJECTION, POWDER, FOR SOLUTION INTRAVENOUS at 21:27

## 2024-08-23 RX ADMIN — SODIUM BICARBONATE 650 MG: 650 TABLET ORAL at 21:24

## 2024-08-23 RX ADMIN — HYDRALAZINE HYDROCHLORIDE 50 MG: 50 TABLET ORAL at 21:23

## 2024-08-23 RX ADMIN — SODIUM BICARBONATE 650 MG: 650 TABLET ORAL at 07:53

## 2024-08-23 RX ADMIN — SODIUM CHLORIDE, POTASSIUM CHLORIDE, SODIUM LACTATE AND CALCIUM CHLORIDE: 600; 310; 30; 20 INJECTION, SOLUTION INTRAVENOUS at 01:39

## 2024-08-23 ASSESSMENT — PAIN SCALES - GENERAL
PAINLEVEL_OUTOF10: 0

## 2024-08-23 ASSESSMENT — ENCOUNTER SYMPTOMS
ABDOMINAL DISTENTION: 0
ABDOMINAL PAIN: 0
CONSTIPATION: 0
RESPIRATORY NEGATIVE: 1
DIARRHEA: 0
ALLERGIC/IMMUNOLOGIC NEGATIVE: 1
EYES NEGATIVE: 1
ANAL BLEEDING: 0

## 2024-08-23 NOTE — PROGRESS NOTES
Peripheral IV in the left AC is leaking. Patient has no IV access at this time. On call resident also made aware.

## 2024-08-23 NOTE — PROGRESS NOTES
Another unit of Blood was received and started reached at 0610 am.This writer remained at bedside x15 minutes , no reactions noted , no c/o SOB or pain noted.

## 2024-08-23 NOTE — PROGRESS NOTES
Peripheral line obtain in right forearm via ultrasound guided IV. This writer called blood bank to release the unit of blood requested.

## 2024-08-23 NOTE — PROGRESS NOTES
Ph: [phone], Fax: [phone]           [facility]               Reason for admission:    Dizziness and SOB                 Brief Summary :   Fozia Boland is being seen by nephrology for CKD V       Assessment and plan:     BP is better controlled  Urine is 1800 ml   Creatinine at 3.2    Very high BUN is from GI bleed  Creatinine is close to baseline   EGD report reviewed with AVM clipping in gastric  She had GI work up in 2022  Monitor renal daily and give Lokelma as needed  Continue IVF  Hold Procardia XL as she had blood loss and BP is relatively low   Transfuse PRBC for GI blood loss anemia            1.  Chronic Kidney Disease: She is stage V CKD. It is likely from DM nephropathy .She has retinal detachment. In the labs current creatinine is 2.9 with a BUN of 118 (previously was 45) and GFR of 16 mL/minute.  - Daily RFP, Mg, strict I's & O's, daily weights    Ultrasound of the kidney (not done during this stay): Renal ultrasound showed bilateral 12 cm kidney with no hydronephrosis. She had a small left renal cyst  Urological procedures: none  Biopsy:  Diabetic nephropathy with 70 % IF  Proteinuria: She has 0.31 g per day of proteinuria improved from 3.8 grams / day   Hepatitis profile is non reactive  Complements are normal.     2.  HTN/Fluid over load:Hypertension is controlled. BP so far has been elevated. She is on Hydralazine ,Lopressor and Nifedipine XL 60 mg.    3.  Bone and hyperparathyroidism : PTH is in range for CKD (lab work prior to admission)  PTH: 76  Vitamin D 25 OH: 113  Bicarbonate : 24. continue bicarbonate BID     4.  Acute on chronic CARIDAD: most likely 2/2 UGIB.   Ferrokinetics: iron saturation is 12 % with ferritin of 33.        5. Lipids: Cholesterol is 88 with an LDL of 26. She is on Evolocumab.      6. Uric acid :  Serum uric acid was 5.7(outpatient). She is on  allopurinol 100 mg QD.     7. hyperkalemia : She is on Lokelma Three times weekly with low K diet.  Troponin was also slightly elevated but this was thought to be secondary to her kidney disease as she has no clear EKG changes at this time.    PMH/PSH/SH/Family History:     Past Medical History:   Diagnosis Date    CAD (coronary artery disease)     CAD (coronary artery disease) 10/20/2016    Distal RCA 3.0 x 15 mm Alpine CRISTEL    Diabetes mellitus (HCC)     Essential hypertension     Gout     Hyperlipidemia           Medication:     Current Facility-Administered Medications: 0.9 % sodium chloride infusion, , IntraVENous, PRN  lactated ringers IV soln infusion, , IntraVENous, Continuous  0.9 % sodium chloride infusion, , IntraVENous, PRN  pantoprazole (PROTONIX) 80 mg in sodium chloride 0.9 % 100 mL infusion, 8 mg/hr, IntraVENous, Continuous  sodium chloride flush 0.9 % injection 5-40 mL, 5-40 mL, IntraVENous, 2 times per day  sodium chloride flush 0.9 % injection 5-40 mL, 5-40 mL, IntraVENous, PRN  0.9 % sodium chloride infusion, , IntraVENous, PRN  ondansetron (ZOFRAN-ODT) disintegrating tablet 4 mg, 4 mg, Oral, Q8H PRN **OR** ondansetron (ZOFRAN) injection 4 mg, 4 mg, IntraVENous, Q6H PRN  acetaminophen (TYLENOL) tablet 650 mg, 650 mg, Oral, Q6H PRN **OR** acetaminophen (TYLENOL) suppository 650 mg, 650 mg, Rectal, Q6H PRN  cyclobenzaprine (FLEXERIL) tablet 10 mg, 10 mg, Oral, TID PRN  hydrALAZINE (APRESOLINE) tablet 50 mg, 50 mg, Oral, BID  metoprolol tartrate (LOPRESSOR) tablet 25 mg, 25 mg, Oral, BID  NIFEdipine (PROCARDIA XL) extended release tablet 60 mg, 60 mg, Oral, Daily  sodium bicarbonate tablet 1,300 mg, 1,300 mg, Oral, BID  glucose chewable tablet 16 g, 4 tablet, Oral, PRN  dextrose bolus 10% 125 mL, 125 mL, IntraVENous, PRN **OR** dextrose bolus 10% 250 mL, 250 mL, IntraVENous, PRN  glucagon injection 1 mg, 1 mg, SubCUTAneous, PRN  dextrose 10 % infusion, , IntraVENous, Continuous PRN  insulin lispro (HUMALOG,ADMELOG) injection vial 0-4 Units, 0-4 Units, SubCUTAneous, TID WC  insulin lispro

## 2024-08-23 NOTE — PROGRESS NOTES
Blood transfusion started at 2200 at 60 ml/hr per M.D order. Increase to 100 ml/hr after 15 minutes, patient has not had any adverse reactions nor any c/o's respiratory distress. Will continue to monitor per facility protocol. Patient resting in bed at present ci-pap device is on at this time.

## 2024-08-23 NOTE — PROGRESS NOTES
Internal Medicine Progress Note    Patient Name: Fozia Boland   Patient : 1950   Date: 2024   Admit Date: 2024     CC: Dizziness and Shortness of Breath (Patient presents to ED with report of dizziness, shortness of breath, vomiting X 1 episode for one day.)       Interval History     Patient was seen and examined in 4303 this morning. She was feeling well and pleasant. She had a drop in hb yesterday to 6.5 which necessitated the transfusion of 2 units of leukoreduced pRBC. She lost her peripheral access and a midline was inserted successfully this morning.    Her vitals were stable overnight. And she remained asymptomatic       ROS   Review of Systems   Constitutional: Negative.    HENT: Negative.     Eyes: Negative.    Respiratory: Negative.     Cardiovascular: Negative.    Gastrointestinal:  Negative for abdominal distention, abdominal pain, anal bleeding, constipation and diarrhea.   Endocrine: Negative.    Musculoskeletal: Negative.    Skin: Negative.    Allergic/Immunologic: Negative.    Neurological: Negative.    Hematological: Negative.    Psychiatric/Behavioral: Negative.            Objective     I/Os:  I/O last 3 completed shifts:  In: 2239.8 [P.O.:380; I.V.:959.8; Blood:900]  Out: 1900 [Urine:1900]      Vital Signs:  Patient Vitals for the past 8 hrs:   BP Temp Temp src Pulse Resp SpO2 Weight   24 0920 135/74 97.3 °F (36.3 °C) Oral 76 16 97 % --   24 0740 (!) 143/66 97.4 °F (36.3 °C) Oral 88 16 96 % --   24 0615 132/74 97.6 °F (36.4 °C) -- 84 18 97 % --   24 0545 107/67 97.9 °F (36.6 °C) Oral 79 18 98 % --   24 0458 125/76 98.6 °F (37 °C) Oral 78 18 96 % 98.6 kg (217 lb 6 oz)       Physical Exam:  Physical Exam  Vitals and nursing note reviewed.   Constitutional:       General: She is not in acute distress.     Appearance: Normal appearance. She is obese. She is not ill-appearing, toxic-appearing or diaphoretic.   HENT:      Head: Normocephalic.  08/22/24  1840 08/23/24  0450   WBC 12.4*  --   --   --  8.1   HGB 6.0*   < > 7.1* 6.5* 6.9*   HCT 19.2*   < > 21.6* 19.7* 20.3*     --   --   --  184   MCV 96.6  --   --   --  91.3    < > = values in this interval not displayed.       Renal:    Recent Labs     08/21/24  0612 08/22/24 0444 08/23/24 0450   * 137 139   K 5.2* 5.3* 4.7    105 107   CO2 16* 21 23   * 111* 104*   CREATININE 2.9* 2.9* 3.2*   GLUCOSE 188* 124* 107*   CALCIUM 9.5 8.8 9.0   MG  --   --  1.80   PHOS  --   --  2.7   ANIONGAP 18* 11 9       Hepatic:   Recent Labs     08/21/24 0612   AST 14*   ALT 12   BILITOT <0.2   ALKPHOS 67       Troponin:   Recent Labs     08/21/24  0610 08/21/24 0612   TROPHS 78* 83*         Pro-BNP:   Recent Labs     08/21/24 0612   PROBNP 77       Lipids: No results for input(s): \"CHOL\", \"TRIG\", \"HDL\", \"VLDL\" in the last 72 hours.    Invalid input(s): \"LDLCALC\"      ABGs:  No results for input(s): \"PHART\", \"FKX0WMF\", \"PO2ART\", \"IQG9SGM\", \"BEART\", \"THGBART\", \"R5GFGCXN\", \"PFK4FAS\" in the last 72 hours.    VBGs:   Recent Labs     08/21/24 0612   PHVEN 7.279*   UGH3EQX 40.6*   PO2VEN 117.0*       INR:   Recent Labs     08/22/24 0444   INR 1.21*     aPTT:   Recent Labs     08/22/24 0444   APTT 26.4       Procalcitonin: No results for input(s): \"PROCAL\" in the last 72 hours.  CRP: No results for input(s): \"CRP\" in the last 72 hours.  ESR: No results for input(s): \"SEDRATE\" in the last 72 hours.      Microbiology:  Results       Procedure Component Value Units Date/Time    COVID-19 & Influenza Combo [[id number]] Collected: 08/21/24 0620    Order Status: Completed Specimen: Nasopharyngeal Swab Updated: 08/21/24 0702     SARS-CoV-2 RNA, RT PCR NOT DETECTED     Comment: Not Detected results do not preclude SARS-CoV-2 infection and  should not be used as the sole basis for patient management  decisions.  Results must be combined with clinical observations,  patient history, and epidemiological

## 2024-08-23 NOTE — PROGRESS NOTES
A.M Hgb 6.9, HCT 20.3 results were sent to Dr. Bird . Patient has new order for a unit of blood. Patient has been made aware.

## 2024-08-23 NOTE — PLAN OF CARE
Problem: Discharge Planning  Goal: Discharge to home or other facility with appropriate resources  Outcome: Progressing     Problem: ABCDS Injury Assessment  Goal: Absence of physical injury  Outcome: Progressing  Flowsheets (Taken 8/23/2024 0348)  Absence of Physical Injury: Implement safety measures based on patient assessment     Problem: Safety - Adult  Goal: Free from fall injury  8/23/2024 0350 by Rita Joel RN  Outcome: Progressing  8/22/2024 1616 by Linda Dorado RN  Outcome: Progressing  Flowsheets (Taken 8/22/2024 1616)  Free From Fall Injury:   Instruct family/caregiver on patient safety   Based on caregiver fall risk screen, instruct family/caregiver to ask for assistance with transferring infant if caregiver noted to have fall risk factors     Problem: Chronic Conditions and Co-morbidities  Goal: Patient's chronic conditions and co-morbidity symptoms are monitored and maintained or improved  Outcome: Progressing     Problem: Pain  Goal: Verbalizes/displays adequate comfort level or baseline comfort level  8/23/2024 0350 by Rita Joel RN  Outcome: Progressing  8/22/2024 1616 by Linda Dorado RN  Outcome: Progressing  Flowsheets (Taken 8/22/2024 1616)  Verbalizes/displays adequate comfort level or baseline comfort level:   Encourage patient to monitor pain and request assistance   Administer analgesics based on type and severity of pain and evaluate response   Consider cultural and social influences on pain and pain management   Assess pain using appropriate pain scale   Implement non-pharmacological measures as appropriate and evaluate response   Notify Licensed Independent Practitioner if interventions unsuccessful or patient reports new pain

## 2024-08-23 NOTE — NURSE NAVIGATOR
POWER MIDLINE PROCEDURE NOTE  Chart reviewed for allergies, diagnosis, labs, known contraindications, reason for line placement and planned length of treatment.  Insertion procedure discussed with patient/family member.  Informed consent not required for Midline placement.  Three patient identifiers - Patient name,   and MRN -  completed &  confirmed verbally.   Hat, mask and eye shield donned.  Midline site scrubbed with Chloraprep  One-Step applicator  for 30 seconds x 1.   Hand Hygiene  performed with 3% Chlorhexidine surgical scrub x1 min prior to  Sterile gloves, sterile gown being donned.  Patient draped using maximal sterile barrier technique ( head to toe ).  Midline site scrubbed a 2nd time with Chloraprep One-Step applicator x 30 sec. Vein located by Ultra Sound and site marked with sterile pen.  1% Lidocaine 5 ml injected intradermal pre-insertion for trimmed Midline.  Midline inserted.  Positive brisk blood return obtained.  Valve applied to lumen.  Midline flushed with 10 mls  0.9% Sterile Sodium Chloride. Midline flushes easily with no resistance.   Skin prep applied to site. Catheter secured with non-sutured locking device per hospital protocol.  Bio-patch/CHG impregnated sterile tegaderm dressing applied. Alcohol Swab Caps applied to valve.   Sterile field maintained during procedure. Positioning wire accounted for post procedure. Pt. Response to procedure, tolerated well. Appearance of site: Clean dry and intact without bleeding or edema. All edges of Tegaderm occlusive.   Site marked with date and initials of RN placing line.Top 2 side rails in up position, call button in reach, RN notified of all of the above.   A Power Midline 12 CM placed in the L Brachial UA vein. 0 cm showing from insertion site.

## 2024-08-23 NOTE — PLAN OF CARE
Problem: ABCDS Injury Assessment  Goal: Absence of physical injury  8/23/2024 1623 by Linda Dorado, RN  Outcome: Progressing  Flowsheets (Taken 8/23/2024 0348 by Rita Joel, RN)  Absence of Physical Injury: Implement safety measures based on patient assessment     Problem: Safety - Adult  Goal: Free from fall injury  8/23/2024 1623 by Linda Dorado, RN  Outcome: Progressing  Flowsheets (Taken 8/22/2024 1616)  Free From Fall Injury:   Instruct family/caregiver on patient safety   Based on caregiver fall risk screen, instruct family/caregiver to ask for assistance with transferring infant if caregiver noted to have fall risk factors     Problem: Chronic Conditions and Co-morbidities  Goal: Patient's chronic conditions and co-morbidity symptoms are monitored and maintained or improved  8/23/2024 1623 by Linda Dorado RN  Outcome: Progressing  Flowsheets (Taken 8/23/2024 1623)  Care Plan - Patient's Chronic Conditions and Co-Morbidity Symptoms are Monitored and Maintained or Improved:   Monitor and assess patient's chronic conditions and comorbid symptoms for stability, deterioration, or improvement   Collaborate with multidisciplinary team to address chronic and comorbid conditions and prevent exacerbation or deterioration   Update acute care plan with appropriate goals if chronic or comorbid symptoms are exacerbated and prevent overall improvement and discharge

## 2024-08-23 NOTE — CARE COORDINATION
Case Management Assessment  Initial Evaluation    Date/Time of Evaluation: 8/23/2024 2:33 PM  Assessment Completed by: Mar Gaitan    If patient is discharged prior to next notation, then this note serves as note for discharge by case management.    Patient Name: Fozia Boland                   YOB: 1950  Diagnosis: Upper gastrointestinal bleeding [K92.2]  Symptomatic anemia [D64.9]                   Date / Time: 8/21/2024  5:34 AM    Patient Admission Status: Inpatient   Readmission Risk (Low < 19, Mod (19-27), High > 27): Readmission Risk Score: 18.3    Current PCP: Shima Vizcarra MD  PCP verified by CM? Yes    Chart Reviewed: Yes      History Provided by: Patient  Patient Orientation: Alert and Oriented    Patient Cognition: Alert    Hospitalization in the last 30 days (Readmission):  No    If yes, Readmission Assessment in CM Navigator will be completed.    Advance Directives:      Code Status: Full Code   Patient's Primary Decision Maker is: Legal Next of Kin    Primary Decision Maker: Lisette Boland - [facility] - [phone]    Discharge Planning:    Patient lives with: Spouse/Significant Other Type of Home: Apartment  Primary Care Giver: Self  Patient Support Systems include: Spouse/Significant Other, Family Members   Current Financial resources: Medicare  Current community resources: None  Current services prior to admission: None            Current DME:              Type of Home Care services:  None    ADLS  Prior functional level: Independent in ADLs/IADLs  Current functional level: Independent in ADLs/IADLs    PT AM-PAC:   /24  OT AM-PAC:   /24    Family can provide assistance at DC: Yes  Would you like Case Management to discuss the discharge plan with any other family members/significant others, and if so, who? No  Plans to Return to Present Housing: Yes  Other Identified Issues/Barriers to RETURNING to current housing: None  Potential Assistance needed at discharge: N/A             Potential DME:    Patient expects to discharge to: Apartment  Plan for transportation at discharge: Self    Financial    Payor: [employer] MEDICARE / Plan: [employer] MEDICARE-ADVANTAGE PPO / Product Type: Medicare /     Does insurance require precert for SNF: Yes    Potential assistance Purchasing Medications: No  Meds-to-Beds request:        [facility]/pharmacy #[id number] - [place], [place] - [address] - P [phone] - F [phone]  [address]  [facility] [address]  Phone: [phone] Fax: [phone]    [facility] ([facility] #[id number] - [place], [place] - [address] - P [phone] - F [phone]  [address] [place] [address]  Phone: [phone] Fax: [phone]    [facility] [place] - [address] - P [phone] - F [phone]  [facility]  [place] [address]  Phone: [phone] Fax: [phone]      Notes:    Factors facilitating achievement of predicted outcomes: Family support    Barriers to discharge: Symptomatic anemia, PRBCs, Lightheadedness, vomiting, CKD V, EGD    Additional Case Management Notes: Patient from home with .  Patient admitted with symptomatic anemia. CM met with patient and  at bedside.  Patient is [employer].  [employer] and still drives. Plan is to return home at discharge.   to transport.    The Plan for Transition of Care is related to the following treatment goals of Upper gastrointestinal bleeding [K92.2]  Symptomatic anemia [D64.9]    IF APPLICABLE: The Patient and/or patient representative Fozia and her family were provided with a choice of provider and agrees with the discharge plan. Freedom of choice list with basic dialogue that supports the patient's individualized plan of care/goals and shares the quality data associated with the providers was provided to: Patient   Patient Representative Name:       The Patient and/or Patient Representative Agree with the

## 2024-08-23 NOTE — DISCHARGE INSTRUCTIONS
Important Reminders:    Please schedule an appointment with your primary care physician within one week.   Please schedule an appointment with your gastroenterologist within two weeks.  Please schedule an appointment with your nephrologist within two weeks.     We saw you in the hospital for upper gastrointestinal bleed.     You were treated with blood products and fluids.     We are discharging you with protonix 40 mg twice daily.     You should seek immediate medical attention if:  You have a fever (greater than 101 degrees F),  You have chest pain, shortness of breath, abdominal pain, or uncontrollable vomiting,  You are unable to eat or drink,  You pass out,  You have difficulty moving your arms or legs,   You have difficulty speaking or slurred speech,  Or, you have any concern that you feel needs acute physician evaluation.

## 2024-08-24 VITALS
SYSTOLIC BLOOD PRESSURE: 128 MMHG | HEART RATE: 68 BPM | TEMPERATURE: 97.5 F | WEIGHT: 217.81 LBS | OXYGEN SATURATION: 98 % | RESPIRATION RATE: 16 BRPM | BODY MASS INDEX: 42.76 KG/M2 | HEIGHT: 60 IN | DIASTOLIC BLOOD PRESSURE: 73 MMHG

## 2024-08-24 LAB
ALBUMIN SERPL-MCNC: 3.3 G/DL (ref 3.4–5)
ANION GAP SERPL CALCULATED.3IONS-SCNC: 8 MMOL/L (ref 3–16)
BASOPHILS # BLD: 0 K/UL (ref 0–0.2)
BASOPHILS NFR BLD: 0.2 %
BUN SERPL-MCNC: 85 MG/DL (ref 7–20)
CALCIUM SERPL-MCNC: 9.2 MG/DL (ref 8.3–10.6)
CHLORIDE SERPL-SCNC: 107 MMOL/L (ref 99–110)
CO2 SERPL-SCNC: 24 MMOL/L (ref 21–32)
CREAT SERPL-MCNC: 3 MG/DL (ref 0.6–1.2)
DEPRECATED RDW RBC AUTO: 15.6 % (ref 12.4–15.4)
EOSINOPHIL # BLD: 0.1 K/UL (ref 0–0.6)
EOSINOPHIL NFR BLD: 2 %
GFR SERPLBLD CREATININE-BSD FMLA CKD-EPI: 16 ML/MIN/{1.73_M2}
GLUCOSE BLD-MCNC: 123 MG/DL (ref 70–99)
GLUCOSE SERPL-MCNC: 116 MG/DL (ref 70–99)
HCT VFR BLD AUTO: 23.5 % (ref 36–48)
HGB BLD-MCNC: 8.1 G/DL (ref 12–16)
LYMPHOCYTES # BLD: 1.4 K/UL (ref 1–5.1)
LYMPHOCYTES NFR BLD: 18.9 %
MAGNESIUM SERPL-MCNC: 1.8 MG/DL (ref 1.8–2.4)
MCH RBC QN AUTO: 31.1 PG (ref 26–34)
MCHC RBC AUTO-ENTMCNC: 34.3 G/DL (ref 31–36)
MCV RBC AUTO: 90.8 FL (ref 80–100)
MONOCYTES # BLD: 0.6 K/UL (ref 0–1.3)
MONOCYTES NFR BLD: 8.8 %
NEUTROPHILS # BLD: 5.2 K/UL (ref 1.7–7.7)
NEUTROPHILS NFR BLD: 70.1 %
PERFORMED ON: ABNORMAL
PHOSPHATE SERPL-MCNC: 3.1 MG/DL (ref 2.5–4.9)
PLATELET # BLD AUTO: 199 K/UL (ref 135–450)
PMV BLD AUTO: 7.3 FL (ref 5–10.5)
POTASSIUM SERPL-SCNC: 4.8 MMOL/L (ref 3.5–5.1)
RBC # BLD AUTO: 2.59 M/UL (ref 4–5.2)
SODIUM SERPL-SCNC: 139 MMOL/L (ref 136–145)
WBC # BLD AUTO: 7.3 K/UL (ref 4–11)

## 2024-08-24 PROCEDURE — 6370000000 HC RX 637 (ALT 250 FOR IP): Performed by: INTERNAL MEDICINE

## 2024-08-24 PROCEDURE — 6360000002 HC RX W HCPCS

## 2024-08-24 PROCEDURE — 2580000003 HC RX 258

## 2024-08-24 PROCEDURE — 83735 ASSAY OF MAGNESIUM: CPT

## 2024-08-24 PROCEDURE — 2580000003 HC RX 258: Performed by: INTERNAL MEDICINE

## 2024-08-24 PROCEDURE — 80069 RENAL FUNCTION PANEL: CPT

## 2024-08-24 PROCEDURE — 85025 COMPLETE CBC W/AUTO DIFF WBC: CPT

## 2024-08-24 PROCEDURE — 6370000000 HC RX 637 (ALT 250 FOR IP)

## 2024-08-24 RX ORDER — PANTOPRAZOLE SODIUM 40 MG/1
40 TABLET, DELAYED RELEASE ORAL
Status: DISCONTINUED | OUTPATIENT
Start: 2024-08-24 | End: 2024-08-24 | Stop reason: HOSPADM

## 2024-08-24 RX ORDER — MAGNESIUM SULFATE IN WATER 40 MG/ML
2000 INJECTION, SOLUTION INTRAVENOUS ONCE
Status: COMPLETED | OUTPATIENT
Start: 2024-08-24 | End: 2024-08-24

## 2024-08-24 RX ORDER — PANTOPRAZOLE SODIUM 40 MG/1
40 TABLET, DELAYED RELEASE ORAL
Qty: 60 TABLET | Refills: 0 | Status: SHIPPED | OUTPATIENT
Start: 2024-08-24 | End: 2024-09-23

## 2024-08-24 RX ADMIN — SODIUM CHLORIDE, PRESERVATIVE FREE 10 ML: 5 INJECTION INTRAVENOUS at 08:47

## 2024-08-24 RX ADMIN — METOPROLOL TARTRATE 25 MG: 25 TABLET, FILM COATED ORAL at 08:46

## 2024-08-24 RX ADMIN — PANTOPRAZOLE SODIUM 8 MG/HR: 40 INJECTION, POWDER, FOR SOLUTION INTRAVENOUS at 06:29

## 2024-08-24 RX ADMIN — HYDRALAZINE HYDROCHLORIDE 50 MG: 50 TABLET ORAL at 08:45

## 2024-08-24 RX ADMIN — SODIUM BICARBONATE 650 MG: 650 TABLET ORAL at 08:45

## 2024-08-24 RX ADMIN — MAGNESIUM SULFATE HEPTAHYDRATE 2000 MG: 40 INJECTION, SOLUTION INTRAVENOUS at 08:56

## 2024-08-24 ASSESSMENT — PAIN SCALES - GENERAL
PAINLEVEL_OUTOF10: 0

## 2024-08-24 NOTE — PROGRESS NOTES
Discharge instructions reviewed with pt/family, discussed medications, VU, denies any further questions or anxiety related to discharge. IV/tele discontinued. Pt discharged to home. Taken from room 4303 by wheelchair, personal belongings taken with.    New medication supplied through [facility].

## 2024-08-24 NOTE — PROGRESS NOTES
Ph: [phone], Fax: [phone]           [facility]               Reason for admission:    Dizziness and SOB                 Brief Summary :   Fozia Boland is being seen by nephrology for CKD V         Interval History and Plan:     Patient seen at bedside with nurse  Comfortable  /81  On room air  Urine output 2200 ml  Cr better 3.2 > 3.0  BUN improving  Lytes stable.   Off of IVF          Very high BUN is from GI bleed  Creatinine is close to baseline   EGD showed AVM clipping in gastric  Monitor renal daily and Lokelma as needed  Encourage oral hydration.   Holding Procardia XL as she had blood loss and BP is relatively low   Transfuse PRBC as needed for GI blood loss anemia           Assessment        1.  Chronic Kidney Disease: She is stage V CKD. It is likely from DM nephropathy .She has retinal detachment. In the labs current creatinine is 2.9 with a BUN of 118 (previously was 45) and GFR of 16 mL/minute.  - Daily RFP, Mg, strict I's & O's, daily weights    Ultrasound of the kidney (not done during this stay): Renal ultrasound showed bilateral 12 cm kidney with no hydronephrosis. She had a small left renal cyst  Urological procedures: none  Biopsy:  Diabetic nephropathy with 70 % IF  Proteinuria: She has 0.31 g per day of proteinuria improved from 3.8 grams / day   Hepatitis profile is non reactive  Complements are normal.     2.  HTN/Fluid over load:Hypertension is controlled. BP so far has been elevated. She is on Hydralazine ,Lopressor and Nifedipine XL 60 mg.    3.  Bone and hyperparathyroidism : PTH is in range for CKD (lab work prior to admission)  PTH: 76  Vitamin D 25 OH: 113  Bicarbonate : 24. continue bicarbonate BID     4.  Acute on chronic CARIDAD: most likely 2/2 UGIB.   Ferrokinetics: iron saturation is 12 % with ferritin of 33.        5. Lipids: Cholesterol is 88 with an LDL of 26. She is on Evolocumab.      6. Uric acid :  Serum uric acid was 5.7(outpatient).  further workup by GI. There was slight lactic acidosis but this is most likely secondary to a demand mismatch. Troponin was also slightly elevated but this was thought to be secondary to her kidney disease as she has no clear EKG changes at this time.    PMH/PSH/SH/Family History:     Past Medical History:   Diagnosis Date    CAD (coronary artery disease)     CAD (coronary artery disease) 10/20/2016    Distal RCA 3.0 x 15 mm Alpine CRISTEL    Diabetes mellitus (HCC)     Essential hypertension     Gout     Hyperlipidemia           Medication:     Current Facility-Administered Medications: magnesium sulfate 2000 mg in 50 mL IVPB premix, 2,000 mg, IntraVENous, Once  0.9 % sodium chloride infusion, , IntraVENous, PRN  sodium bicarbonate tablet 650 mg, 650 mg, Oral, BID  sodium chloride flush 0.9 % injection 5-40 mL, 5-40 mL, IntraVENous, 2 times per day  sodium chloride flush 0.9 % injection 5-40 mL, 5-40 mL, IntraVENous, PRN  0.9 % sodium chloride infusion, , IntraVENous, PRN  sodium chloride flush 0.9 % injection 5-40 mL, 5-40 mL, IntraVENous, 2 times per day  sodium chloride flush 0.9 % injection 5-40 mL, 5-40 mL, IntraVENous, PRN  0.9 % sodium chloride infusion, , IntraVENous, PRN  lidocaine PF 1 % injection 50 mg, 50 mg, IntraDERmal, Once  0.9 % sodium chloride infusion, , IntraVENous, PRN  pantoprazole (PROTONIX) 80 mg in sodium chloride 0.9 % 100 mL infusion, 8 mg/hr, IntraVENous, Continuous  sodium chloride flush 0.9 % injection 5-40 mL, 5-40 mL, IntraVENous, 2 times per day  sodium chloride flush 0.9 % injection 5-40 mL, 5-40 mL, IntraVENous, PRN  0.9 % sodium chloride infusion, , IntraVENous, PRN  ondansetron (ZOFRAN-ODT) disintegrating tablet 4 mg, 4 mg, Oral, Q8H PRN **OR** ondansetron (ZOFRAN) injection 4 mg, 4 mg, IntraVENous, Q6H PRN  acetaminophen (TYLENOL) tablet 650 mg, 650 mg, Oral, Q6H PRN **OR** acetaminophen (TYLENOL) suppository 650 mg, 650 mg, Rectal, Q6H PRN  cyclobenzaprine (FLEXERIL) tablet 10 mg,  10 mg, Oral, TID PRN  hydrALAZINE (APRESOLINE) tablet 50 mg, 50 mg, Oral, BID  metoprolol tartrate (LOPRESSOR) tablet 25 mg, 25 mg, Oral, BID  [Held by provider] NIFEdipine (PROCARDIA XL) extended release tablet 60 mg, 60 mg, Oral, Daily  glucose chewable tablet 16 g, 4 tablet, Oral, PRN  dextrose bolus 10% 125 mL, 125 mL, IntraVENous, PRN **OR** dextrose bolus 10% 250 mL, 250 mL, IntraVENous, PRN  glucagon injection 1 mg, 1 mg, SubCUTAneous, PRN  dextrose 10 % infusion, , IntraVENous, Continuous PRN  insulin lispro (HUMALOG,ADMELOG) injection vial 0-4 Units, 0-4 Units, SubCUTAneous, TID WC  insulin lispro (HUMALOG,ADMELOG) injection vial 0-4 Units, 0-4 Units, SubCUTAneous, Nightly    Vitals :     Vitals:    08/24/24 0625   BP:    Pulse: 74   Resp:    Temp:    SpO2:           Physical Examination :     appearance: Alert, orientated  Respiratory: no distress on room air and lungs clear.   Cardiovascular: No edema.   Abdomen: -  soft  Other relevant findings:      Labs :     CBC:   Recent Labs     08/23/24  0450 08/23/24  1040 08/24/24  0358   WBC 8.1  --  7.3   HGB 6.9* 8.5* 8.1*   HCT 20.3* 24.9* 23.5*     --  199     BMP:    Recent Labs     08/22/24  0444 08/23/24  0450 08/24/24  0358    139 139   K 5.3* 4.7 4.8    107 107   CO2 21 23 24   * 104* 85*   CREATININE 2.9* 3.2* 3.0*   GLUCOSE 124* 107* 116*   MG  --  1.80 1.80   PHOS  --  2.7 3.1     Lab Results   Component Value Date/Time    COLORU Yellow 08/21/2024 08:05 AM    NITRU Negative 08/21/2024 08:05 AM    GLUCOSEU Negative 08/21/2024 08:05 AM    KETUA Negative 08/21/2024 08:05 AM    UROBILINOGEN 0.2 08/21/2024 08:05 AM    BILIRUBINUR Negative 08/21/2024 08:05 AM        ----------------------------------------------------------  Please call with questions at      24 Hrs Answering service [phone]  Perfect serve, or cell phone 7 am - 5pm  MD cole [facility]

## 2024-08-24 NOTE — PROGRESS NOTES
Assessment  74-year-old female with history of coronary artery disease status post PCI on Brilinta and aspirin, CKD, hypertension, type 2 diabetes mellitus presenting with abrupt onset of hematemesis and melena.  Chronic history of anemia and had prior episode of GI bleeding 2022 with negative EGD, colonoscopy, and video capsule endoscopy for clear cause of bleeding/anemia.  Suspected diverticular hemorrhage versus bleeding from small bowel ulceration/erosions.  She was noted to have H. pylori at time of assessment and was treated with tetracycline/Flagyl/omeprazole/Pepto-Bismol.  EGD performed 8/22/2024 showing large gastric blood clot and actively bleeding gastric AVM treated with epinephrine and clip with complete hemostasis achieved.  Still with melena x 2 today, though may represent clearing of retained blood.     Plan:  Continue to follow serial H&H with transfusion as needed  Continue pantoprazole  Hold aspirin and Brilinta for now until stable hemoglobin noted.  Question if ongoing need for DAPT, appears PCI in 2016. Defer to primary team / Cardiology.    Subjective:  We are following for upper GI bleeding. Patient reports feeling better today in general.  2 loose stools today, still black in color.    Objective:    Review of Systems:    Constitutional: Negative for fever, chills, and unexpected weight change.   HENT: Negative for trouble swallowing.    Respiratory: Negative for cough, chest tightness and shortness of breath.    Cardiovascular: Negative for chest pain  Gastrointestinal: see HPI  Musculoskeletal: Negative for unusual arthralgias.   Skin: Negative for rash.     Scheduled Meds:   sodium bicarbonate  650 mg Oral BID    sodium chloride flush  5-40 mL IntraVENous 2 times per day    sodium chloride flush  5-40 mL IntraVENous 2 times per day    lidocaine 1 % injection  50 mg IntraDERmal Once    sodium chloride flush  5-40 mL IntraVENous 2 times per day    hydrALAZINE  50 mg Oral BID

## 2024-08-24 NOTE — PROGRESS NOTES
Assessment  74-year-old female with history of coronary artery disease status post PCI on Brilinta and aspirin, CKD, hypertension, type 2 diabetes mellitus presenting with abrupt onset of hematemesis and melena.  Chronic history of anemia and had prior episode of GI bleeding 2022 with negative EGD, colonoscopy, and video capsule endoscopy for clear cause of bleeding/anemia.  Suspected diverticular hemorrhage versus bleeding from small bowel ulceration/erosions then.  She was noted to have H. pylori at time of assessment and was treated with tetracycline/Flagyl/omeprazole/Pepto-Bismol.  EGD performed 8/22/2024 showing large gastric blood clot and actively bleeding gastric AVM treated with epinephrine and clip with complete hemostasis achieved.  Still with melena x 1 today, though may represent clearing of retained blood with stable Hgb.      Plan:  Continue pantoprazole 40mg daily for 1 month  Ok to resume ASA at this time   Plans to hold Brilinta noted  Recommend recheck of CBC in 1-2 weeks  Will sign off.  Call with further questions or concerns.     Subjective:  We are following for upper GI bleeding. Patient reports feeling better today in general.  No abdominal pain.  1 loose stools today, still black in color.    Objective:    Review of Systems:    Constitutional: Negative for fever, chills, and unexpected weight change.   HENT: Negative for trouble swallowing.    Respiratory: Negative for cough, chest tightness and shortness of breath.    Cardiovascular: Negative for chest pain  Gastrointestinal: see HPI  Musculoskeletal: Negative for unusual arthralgias.   Skin: Negative for rash.     Scheduled Meds:   pantoprazole  40 mg Oral BID AC    sodium bicarbonate  650 mg Oral BID    sodium chloride flush  5-40 mL IntraVENous 2 times per day    sodium chloride flush  5-40 mL IntraVENous 2 times per day    lidocaine 1 % injection  50 mg IntraDERmal Once    sodium chloride flush  5-40 mL IntraVENous 2 times per

## 2024-08-24 NOTE — CARE COORDINATION
This patient is going home with  today. Patient is independent at home, drives and states no needs. Checked with nurse, Winston and verified as well that patient has no needs at discharge. Electronically signed by Yola Landon RN on 8/24/2024 at 10:35 AM

## 2024-08-24 NOTE — PLAN OF CARE
Problem: Discharge Planning  Goal: Discharge to home or other facility with appropriate resources  Outcome: Progressing     Problem: ABCDS Injury Assessment  Goal: Absence of physical injury  8/24/2024 0019 by Lily Guzman RN  Outcome: Progressing  Flowsheets (Taken 8/23/2024 1940)  Absence of Physical Injury: Implement safety measures based on patient assessment     Problem: Safety - Adult  Goal: Free from fall injury  8/24/2024 0019 by Lily Guzman RN  Outcome: Progressing  Flowsheets (Taken 8/23/2024 1940)  Free From Fall Injury: Instruct family/caregiver on patient safety     Problem: Chronic Conditions and Co-morbidities  Goal: Patient's chronic conditions and co-morbidity symptoms are monitored and maintained or improved  8/24/2024 0019 by Lily Guzman RN  Outcome: Progressing  Flowsheets (Taken 8/23/2024 1940)  Care Plan - Patient's Chronic Conditions and Co-Morbidity Symptoms are Monitored and Maintained or Improved:   Monitor and assess patient's chronic conditions and comorbid symptoms for stability, deterioration, or improvement   Collaborate with multidisciplinary team to address chronic and comorbid conditions and prevent exacerbation or deterioration   Update acute care plan with appropriate goals if chronic or comorbid symptoms are exacerbated and prevent overall improvement and discharge     Problem: Pain  Goal: Verbalizes/displays adequate comfort level or baseline comfort level  Outcome: Progressing

## 2024-08-24 NOTE — DISCHARGE SUMMARY
INTERNAL MEDICINE DEPARTMENT AT THE [facility]  DISCHARGE SUMMARY    Patient ID: Fozia Boland                                             Discharge Date: 8/24/24   Patient's PCP: Shima Vizcarra MD                                          Discharge Physician: Elsi Cartwright MD  Admit Date: 8/21/2024   Admitting Physician: Elsi Cartwright MD    PROBLEMS DURING HOSPITALIZATION:  Present on Admission:   Symptomatic anemia   History of Helicobacter pylori infection   Acute blood loss anemia   Chronic kidney disease (CKD), stage V (HCC)   Azotemia   History of CAD (coronary artery disease)      Hospital Course:   Fozia Boland is a 74-year-old female with past medical history of CAD s/p stent 2016, hypertension, diabetes, gout, hyperlipidemia who presented with fatigue, lightheadedness and increased lethargy.  Patient noted that she had a large episode of black stool and brown emesis and felt fatigued afterwards.  She presented to the ER with tachycardia heart rate 108, labs remarkable for BUN of 118, creatinine 2.9, lactic acid 2.6, Troponin 78 > 83 and hemoglobin down to 6.  She was admitted for symptomatic acute blood loss anemia.  During admission patient was placed on a Protonix drip, received IV fluids, and received a total of 5 units of pRBCs to maintain her baseline hemoglobin of 7-8. GI was consulted and did an EGD on 8/22/24. EGD showed large gastric blood clot and active bleeding gastric AVM treated with epinephrine and clip with complete hemostasis. Patient was monitored for 24 hours and upon discharge she was hemodynamically stable with tolerance of PO intake. Patient will stop brilinta and continue aspirin. She will continue protonix 40 mg BID.  Informed patient to follow up with her PCP, GI, and nephrologist outpatient.       At the time of discharge, patient reported feeling stable. They were not in acute distress and vital signs were within normal limits. Further, the patient  tablet  Commonly known as: FLEXERIL  Take 1 tablet by mouth 3 times daily as needed for Muscle spasms     hydrALAZINE 50 MG tablet  Commonly known as: APRESOLINE     lidocaine 5 %  Commonly known as: Lidoderm  Place 1 patch onto the skin daily 12 hours on, 12 hours off.     lidocaine viscous hcl 2 % Soln solution  Commonly known as: XYLOCAINE  Take 15 mLs by mouth every 3 hours as needed for Irritation     metoprolol tartrate 25 MG tablet  Commonly known as: LOPRESSOR     NIFEdipine 60 MG extended release tablet  Commonly known as: PROCARDIA XL  Take 1 tablet by mouth daily     Repatha SureClick 140 MG/ML Soaj  Generic drug: Evolocumab  INJECT 1 PEN UNDER THE SKIN EVERY 14 DAYS     sertraline 50 MG tablet  Commonly known as: ZOLOFT     sodium bicarbonate 650 MG tablet  Take 2 tablets by mouth 2 times daily     sodium zirconium cyclosilicate 10 g Pack oral suspension  Commonly known as: LOKELMA     vitamin D 25 MCG (1000 UT) Tabs tablet  Commonly known as: CHOLECALCIFEROL            STOP taking these medications      glipiZIDE 2.5 MG extended release tablet  Commonly known as: GLUCOTROL XL     ticagrelor 60 MG Tabs tablet  Commonly known as: Brilinta               Where to Get Your Medications        These medications were sent to [facility]/pharmacy #[id number] - [place], [place] - [address] - P [phone] - F [phone]  [address], [facility] [address]      Phone: [phone]   pantoprazole 40 MG tablet          Activity: as tolerated   Diet: regular diet  Wound Care: none needed     Signed:  Shahnaz Cabral MD, PGY-2  Internal Medicine   8/24/2024

## 2024-08-24 NOTE — DISCHARGE INSTR - COC
Continuity of Care Form    Patient Name: Fozia Boland   :  1950  MRN:  [id number]    Admit date:  2024  Discharge date:  ***    Code Status Order: Full Code   Advance Directives:   Advance Care Flowsheet Documentation        Date/Time Healthcare Directive Type of Healthcare Directive Copy in Chart Healthcare Agent Appointed Healthcare Agent's Name Healthcare Agent's Phone Number    24 1237 No, patient does not have an advance directive for healthcare treatment  --  --  --  --  --                     Admitting Physician:  Elsi Cartwright MD  PCP: Shima Vizcarra MD    Discharging Nurse: ***  Discharging Hospital Unit/Room#: 4303/4303-01  Discharging Unit Phone Number: ***    Emergency Contact:   Extended Emergency Contact Information  Primary Emergency Contact: Lisette Boland  Home Phone: [phone]  Mobile Phone: [phone]  Relation: Child  Secondary Emergency Contact: Lexi Tran  Home Phone: [phone]  Relation: Other    Past Surgical History:  Past Surgical History:   Procedure Laterality Date    ANGIOPLASTY      GS    APPENDECTOMY      BREAST BIOPSY       SECTION      CHOLECYSTECTOMY      COLONOSCOPY N/A 2022    COLONOSCOPY POLYPECTOMY SNARE/COLD BIOPSY performed by Luca Hollis MD at [facility] ENDOSCOPY    CT BIOPSY RENAL  2021    CT BIOPSY RENAL 2021 [facility] CT SCAN    HERNIA REPAIR      UPPER GASTROINTESTINAL ENDOSCOPY N/A 2022    EGD BIOPSY performed by Luca Hollis MD at [facility] ENDOSCOPY    UPPER GASTROINTESTINAL ENDOSCOPY N/A 2024    ESOPHAGOGASTRODUODENOSCOPY CONTROL HEMORRHAGE performed by Darryn Olvera MD at [facility] ENDOSCOPY       Immunization History:   Immunization History   Administered Date(s) Administered    COVID-19, PFIZER GRAY top, DO NOT Dilute, (age 12 y+), IM, 30 mcg/0.3 mL 2022    COVID-19, PFIZER PURPLE top, DILUTE for use, (age 12 y+), 30mcg/0.3mL 03/10/2021, 2021, 2021    COVID-19,  PFIZER, ( formula), (age 12y+), IM, 30mcg/0.3mL 10/24/2023    Influenza Virus Vaccine 1900, 10/14/2005, 2006, 2007, 10/17/2008, 2010, 2011, 2013, 10/06/2014, 2015    Pneumococcal, PCV-13, PREVNAR 13, (age 6w+), IM, 0.5mL 2017    Pneumococcal, PPSV23, PNEUMOVAX 23, (age 2y+), SC/IM, 0.5mL 1900, 2012    Td, unspecified formulation 1900, 2007    Zoster Live (Zostavax) 2013       Active Problems:  Patient Active Problem List   Diagnosis Code    Coronary angioplasty status Z98.61    Essential hypertension I10    Pure hypercholesterolemia E78.00    Gout M10.9    Type 2 diabetes mellitus without complication ([facility]) E11.9    Angina pectoris ([facility]) I20.9    S/P coronary angiogram Z98.890    Acute pancreatitis without infection or necrosis K85.90    CAD (coronary artery disease) I25.10    Morbid obesity with BMI of 40.0-44.9, adult ([facility]) E66.01, Z68.41    COVID-19 U07.1    GI bleed K92.2    Symptomatic anemia D64.9    History of Helicobacter pylori infection Z86.19    Acute blood loss anemia D62    Chronic kidney disease (CKD), stage V ([facility]) N18.5    Azotemia R79.89    History of CAD (coronary artery disease) Z86.79       Isolation/Infection:   Isolation            No Isolation          Patient Infection Status       Infection Onset Added Last Indicated Last Indicated By Review Planned Expiration Resolved Resolved By    None active    Resolved    COVID-19  20 COVID-19   20 Infection                        Nurse Assessment:  Last Vital Signs: /73   Pulse 68   Temp 97.5 °F (36.4 °C) (Oral)   Resp 16   Ht 1.524 m (5')   Wt 98.8 kg (217 lb 13 oz)   LMP 2002   SpO2 98%   BMI 42.54 kg/m²     Last documented pain score (0-10 scale): Pain Level: 0  Last Weight:   Wt Readings from Last 1 Encounters:   24 98.8 kg (217 lb 13 oz)     Mental Status:  {IP PT MENTAL STATUS:}    IV Access:  {[facility] IV

## 2024-09-11 ENCOUNTER — HOSPITAL ENCOUNTER (INPATIENT)
Age: 74
LOS: 2 days | Discharge: HOME | DRG: 313 | End: 2024-09-13
Attending: EMERGENCY MEDICINE | Admitting: INTERNAL MEDICINE
Payer: MEDICARE

## 2024-09-11 ENCOUNTER — APPOINTMENT (OUTPATIENT)
Dept: GENERAL RADIOLOGY | Age: 74
DRG: 313 | End: 2024-09-11
Payer: MEDICARE

## 2024-09-11 DIAGNOSIS — R07.9 CHEST PAIN, UNSPECIFIED TYPE: Primary | ICD-10-CM

## 2024-09-11 DIAGNOSIS — R06.02 SHORTNESS OF BREATH: ICD-10-CM

## 2024-09-11 LAB
ANION GAP SERPL CALCULATED.3IONS-SCNC: 14 MMOL/L (ref 3–16)
BACTERIA URNS QL MICRO: ABNORMAL /HPF
BASOPHILS # BLD: 0.1 K/UL (ref 0–0.2)
BASOPHILS NFR BLD: 0.9 %
BILIRUB UR QL STRIP.AUTO: NEGATIVE
BUN SERPL-MCNC: 52 MG/DL (ref 7–20)
CALCIUM SERPL-MCNC: 9.7 MG/DL (ref 8.3–10.6)
CHLORIDE SERPL-SCNC: 103 MMOL/L (ref 99–110)
CLARITY UR: CLEAR
CO2 SERPL-SCNC: 22 MMOL/L (ref 21–32)
COLOR UR: YELLOW
CREAT SERPL-MCNC: 3.3 MG/DL (ref 0.6–1.2)
DEPRECATED RDW RBC AUTO: 16.9 % (ref 12.4–15.4)
EKG ATRIAL RATE: 120 BPM
EKG DIAGNOSIS: NORMAL
EKG P AXIS: 43 DEGREES
EKG P-R INTERVAL: 148 MS
EKG Q-T INTERVAL: 320 MS
EKG QRS DURATION: 74 MS
EKG QTC CALCULATION (BAZETT): 452 MS
EKG R AXIS: 1 DEGREES
EKG T AXIS: 69 DEGREES
EKG VENTRICULAR RATE: 120 BPM
EOSINOPHIL # BLD: 0.1 K/UL (ref 0–0.6)
EOSINOPHIL NFR BLD: 1.8 %
EPI CELLS #/AREA URNS HPF: ABNORMAL /HPF (ref 0–5)
GFR SERPLBLD CREATININE-BSD FMLA CKD-EPI: 14 ML/MIN/{1.73_M2}
GLUCOSE SERPL-MCNC: 166 MG/DL (ref 70–99)
GLUCOSE UR STRIP.AUTO-MCNC: NEGATIVE MG/DL
HCT VFR BLD AUTO: 25.3 % (ref 36–48)
HGB BLD-MCNC: 8.3 G/DL (ref 12–16)
HGB UR QL STRIP.AUTO: ABNORMAL
KETONES UR STRIP.AUTO-MCNC: NEGATIVE MG/DL
LEUKOCYTE ESTERASE UR QL STRIP.AUTO: ABNORMAL
LYMPHOCYTES # BLD: 2 K/UL (ref 1–5.1)
LYMPHOCYTES NFR BLD: 24.9 %
MAGNESIUM SERPL-MCNC: 1.6 MG/DL (ref 1.8–2.4)
MCH RBC QN AUTO: 30.1 PG (ref 26–34)
MCHC RBC AUTO-ENTMCNC: 32.8 G/DL (ref 31–36)
MCV RBC AUTO: 91.8 FL (ref 80–100)
MONOCYTES # BLD: 0.8 K/UL (ref 0–1.3)
MONOCYTES NFR BLD: 9.7 %
NEUTROPHILS # BLD: 5 K/UL (ref 1.7–7.7)
NEUTROPHILS NFR BLD: 62.7 %
NITRITE UR QL STRIP.AUTO: NEGATIVE
PH UR STRIP.AUTO: 7 [PH] (ref 5–8)
PHOSPHATE SERPL-MCNC: 3.1 MG/DL (ref 2.5–4.9)
PLATELET # BLD AUTO: 427 K/UL (ref 135–450)
PMV BLD AUTO: 7.1 FL (ref 5–10.5)
POTASSIUM SERPL-SCNC: 4.8 MMOL/L (ref 3.5–5.1)
PROT UR STRIP.AUTO-MCNC: 30 MG/DL
RBC # BLD AUTO: 2.76 M/UL (ref 4–5.2)
RBC #/AREA URNS HPF: ABNORMAL /HPF (ref 0–4)
SODIUM SERPL-SCNC: 139 MMOL/L (ref 136–145)
SP GR UR STRIP.AUTO: 1.01 (ref 1–1.03)
TROPONIN, HIGH SENSITIVITY: 67 NG/L (ref 0–14)
TROPONIN, HIGH SENSITIVITY: 68 NG/L (ref 0–14)
TSH SERPL DL<=0.005 MIU/L-ACNC: 5.71 UIU/ML (ref 0.27–4.2)
UA DIPSTICK W REFLEX MICRO PNL UR: YES
URN SPEC COLLECT METH UR: ABNORMAL
UROBILINOGEN UR STRIP-ACNC: 0.2 E.U./DL
WBC # BLD AUTO: 8 K/UL (ref 4–11)
WBC #/AREA URNS HPF: ABNORMAL /HPF (ref 0–5)

## 2024-09-11 PROCEDURE — 99285 EMERGENCY DEPT VISIT HI MDM: CPT

## 2024-09-11 PROCEDURE — 1200000000 HC SEMI PRIVATE

## 2024-09-11 PROCEDURE — 96372 THER/PROPH/DIAG INJ SC/IM: CPT

## 2024-09-11 PROCEDURE — 2580000003 HC RX 258: Performed by: INTERNAL MEDICINE

## 2024-09-11 PROCEDURE — 84484 ASSAY OF TROPONIN QUANT: CPT

## 2024-09-11 PROCEDURE — 83735 ASSAY OF MAGNESIUM: CPT

## 2024-09-11 PROCEDURE — 93005 ELECTROCARDIOGRAM TRACING: CPT | Performed by: EMERGENCY MEDICINE

## 2024-09-11 PROCEDURE — 99223 1ST HOSP IP/OBS HIGH 75: CPT | Performed by: INTERNAL MEDICINE

## 2024-09-11 PROCEDURE — G0378 HOSPITAL OBSERVATION PER HR: HCPCS

## 2024-09-11 PROCEDURE — 85025 COMPLETE CBC W/AUTO DIFF WBC: CPT

## 2024-09-11 PROCEDURE — 84100 ASSAY OF PHOSPHORUS: CPT

## 2024-09-11 PROCEDURE — 81001 URINALYSIS AUTO W/SCOPE: CPT

## 2024-09-11 PROCEDURE — 80048 BASIC METABOLIC PNL TOTAL CA: CPT

## 2024-09-11 PROCEDURE — 6370000000 HC RX 637 (ALT 250 FOR IP): Performed by: INTERNAL MEDICINE

## 2024-09-11 PROCEDURE — 6360000002 HC RX W HCPCS: Performed by: SURGERY

## 2024-09-11 PROCEDURE — 84443 ASSAY THYROID STIM HORMONE: CPT

## 2024-09-11 PROCEDURE — 71045 X-RAY EXAM CHEST 1 VIEW: CPT

## 2024-09-11 PROCEDURE — 94660 CPAP INITIATION&MGMT: CPT

## 2024-09-11 RX ORDER — VITAMIN B COMPLEX
1000 TABLET ORAL DAILY
Status: DISCONTINUED | OUTPATIENT
Start: 2024-09-11 | End: 2024-09-13 | Stop reason: HOSPADM

## 2024-09-11 RX ORDER — HYDRALAZINE HYDROCHLORIDE 50 MG/1
50 TABLET, FILM COATED ORAL 2 TIMES DAILY
Status: DISCONTINUED | OUTPATIENT
Start: 2024-09-11 | End: 2024-09-13 | Stop reason: HOSPADM

## 2024-09-11 RX ORDER — SODIUM CHLORIDE 9 MG/ML
INJECTION, SOLUTION INTRAVENOUS PRN
Status: DISCONTINUED | OUTPATIENT
Start: 2024-09-11 | End: 2024-09-13 | Stop reason: HOSPADM

## 2024-09-11 RX ORDER — ASPIRIN 81 MG/1
81 TABLET ORAL DAILY
Status: DISCONTINUED | OUTPATIENT
Start: 2024-09-11 | End: 2024-09-13 | Stop reason: HOSPADM

## 2024-09-11 RX ORDER — SODIUM CHLORIDE 0.9 % (FLUSH) 0.9 %
5-40 SYRINGE (ML) INJECTION PRN
Status: DISCONTINUED | OUTPATIENT
Start: 2024-09-11 | End: 2024-09-13 | Stop reason: HOSPADM

## 2024-09-11 RX ORDER — ONDANSETRON 2 MG/ML
4 INJECTION INTRAMUSCULAR; INTRAVENOUS EVERY 6 HOURS PRN
Status: DISCONTINUED | OUTPATIENT
Start: 2024-09-11 | End: 2024-09-13 | Stop reason: HOSPADM

## 2024-09-11 RX ORDER — POLYETHYLENE GLYCOL 3350 17 G/17G
17 POWDER, FOR SOLUTION ORAL DAILY PRN
Status: DISCONTINUED | OUTPATIENT
Start: 2024-09-11 | End: 2024-09-13 | Stop reason: HOSPADM

## 2024-09-11 RX ORDER — HEPARIN SODIUM 5000 [USP'U]/ML
5000 INJECTION, SOLUTION INTRAVENOUS; SUBCUTANEOUS EVERY 8 HOURS SCHEDULED
Status: DISCONTINUED | OUTPATIENT
Start: 2024-09-11 | End: 2024-09-13 | Stop reason: HOSPADM

## 2024-09-11 RX ORDER — ACETAMINOPHEN 650 MG/1
650 SUPPOSITORY RECTAL EVERY 6 HOURS PRN
Status: DISCONTINUED | OUTPATIENT
Start: 2024-09-11 | End: 2024-09-13 | Stop reason: HOSPADM

## 2024-09-11 RX ORDER — NIFEDIPINE 60 MG/1
60 TABLET, EXTENDED RELEASE ORAL DAILY
Status: DISCONTINUED | OUTPATIENT
Start: 2024-09-11 | End: 2024-09-13 | Stop reason: HOSPADM

## 2024-09-11 RX ORDER — ENOXAPARIN SODIUM 100 MG/ML
30 INJECTION SUBCUTANEOUS DAILY
Status: DISCONTINUED | OUTPATIENT
Start: 2024-09-11 | End: 2024-09-11

## 2024-09-11 RX ORDER — ONDANSETRON 4 MG/1
4 TABLET, ORALLY DISINTEGRATING ORAL EVERY 8 HOURS PRN
Status: DISCONTINUED | OUTPATIENT
Start: 2024-09-11 | End: 2024-09-13 | Stop reason: HOSPADM

## 2024-09-11 RX ORDER — ACETAMINOPHEN 325 MG/1
650 TABLET ORAL EVERY 6 HOURS PRN
Status: DISCONTINUED | OUTPATIENT
Start: 2024-09-11 | End: 2024-09-13 | Stop reason: HOSPADM

## 2024-09-11 RX ORDER — PANTOPRAZOLE SODIUM 40 MG/1
40 TABLET, DELAYED RELEASE ORAL
Status: DISCONTINUED | OUTPATIENT
Start: 2024-09-11 | End: 2024-09-13 | Stop reason: HOSPADM

## 2024-09-11 RX ORDER — METOPROLOL TARTRATE 25 MG/1
25 TABLET, FILM COATED ORAL 2 TIMES DAILY
Status: DISCONTINUED | OUTPATIENT
Start: 2024-09-11 | End: 2024-09-13 | Stop reason: HOSPADM

## 2024-09-11 RX ORDER — SODIUM BICARBONATE 650 MG/1
1300 TABLET ORAL 2 TIMES DAILY
Status: DISCONTINUED | OUTPATIENT
Start: 2024-09-11 | End: 2024-09-13 | Stop reason: HOSPADM

## 2024-09-11 RX ORDER — SODIUM CHLORIDE 0.9 % (FLUSH) 0.9 %
5-40 SYRINGE (ML) INJECTION EVERY 12 HOURS SCHEDULED
Status: DISCONTINUED | OUTPATIENT
Start: 2024-09-11 | End: 2024-09-13 | Stop reason: HOSPADM

## 2024-09-11 RX ORDER — ALLOPURINOL 100 MG/1
100 TABLET ORAL DAILY
Status: DISCONTINUED | OUTPATIENT
Start: 2024-09-11 | End: 2024-09-13 | Stop reason: HOSPADM

## 2024-09-11 RX ORDER — LANOLIN ALCOHOL/MO/W.PET/CERES
3 CREAM (GRAM) TOPICAL NIGHTLY
Status: DISCONTINUED | OUTPATIENT
Start: 2024-09-11 | End: 2024-09-13 | Stop reason: HOSPADM

## 2024-09-11 RX ADMIN — METOPROLOL TARTRATE 25 MG: 25 TABLET, FILM COATED ORAL at 10:19

## 2024-09-11 RX ADMIN — METOPROLOL TARTRATE 25 MG: 25 TABLET, FILM COATED ORAL at 19:26

## 2024-09-11 RX ADMIN — NIFEDIPINE 60 MG: 60 TABLET, EXTENDED RELEASE ORAL at 10:20

## 2024-09-11 RX ADMIN — ALLOPURINOL 100 MG: 100 TABLET ORAL at 10:20

## 2024-09-11 RX ADMIN — ASPIRIN 81 MG: 81 TABLET, COATED ORAL at 10:20

## 2024-09-11 RX ADMIN — PANTOPRAZOLE SODIUM 40 MG: 40 TABLET, DELAYED RELEASE ORAL at 10:20

## 2024-09-11 RX ADMIN — SODIUM BICARBONATE 1300 MG: 650 TABLET ORAL at 10:20

## 2024-09-11 RX ADMIN — SODIUM BICARBONATE 1300 MG: 650 TABLET ORAL at 19:27

## 2024-09-11 RX ADMIN — HYDRALAZINE HYDROCHLORIDE 50 MG: 50 TABLET ORAL at 19:27

## 2024-09-11 RX ADMIN — SODIUM CHLORIDE, PRESERVATIVE FREE 10 ML: 5 INJECTION INTRAVENOUS at 19:30

## 2024-09-11 RX ADMIN — SERTRALINE 50 MG: 50 TABLET, FILM COATED ORAL at 10:19

## 2024-09-11 RX ADMIN — HYDRALAZINE HYDROCHLORIDE 50 MG: 50 TABLET ORAL at 10:19

## 2024-09-11 RX ADMIN — PANTOPRAZOLE SODIUM 40 MG: 40 TABLET, DELAYED RELEASE ORAL at 15:22

## 2024-09-11 RX ADMIN — SODIUM CHLORIDE, PRESERVATIVE FREE 10 ML: 5 INJECTION INTRAVENOUS at 10:23

## 2024-09-11 RX ADMIN — Medication 1000 UNITS: at 10:20

## 2024-09-11 RX ADMIN — Medication 3 MG: at 19:29

## 2024-09-11 RX ADMIN — SODIUM ZIRCONIUM CYCLOSILICATE 10 G: 10 POWDER, FOR SUSPENSION ORAL at 10:27

## 2024-09-11 RX ADMIN — HEPARIN SODIUM 5000 UNITS: 5000 INJECTION INTRAVENOUS; SUBCUTANEOUS at 15:23

## 2024-09-11 RX ADMIN — HEPARIN SODIUM 5000 UNITS: 5000 INJECTION INTRAVENOUS; SUBCUTANEOUS at 21:57

## 2024-09-11 ASSESSMENT — PAIN DESCRIPTION - LOCATION: LOCATION: CHEST

## 2024-09-11 ASSESSMENT — PAIN - FUNCTIONAL ASSESSMENT: PAIN_FUNCTIONAL_ASSESSMENT: 0-10

## 2024-09-11 ASSESSMENT — PAIN DESCRIPTION - ORIENTATION: ORIENTATION: LEFT

## 2024-09-11 ASSESSMENT — ENCOUNTER SYMPTOMS
GASTROINTESTINAL NEGATIVE: 1
RESPIRATORY NEGATIVE: 1
EYES NEGATIVE: 1

## 2024-09-11 ASSESSMENT — LIFESTYLE VARIABLES
HOW MANY STANDARD DRINKS CONTAINING ALCOHOL DO YOU HAVE ON A TYPICAL DAY: PATIENT DOES NOT DRINK
HOW OFTEN DO YOU HAVE A DRINK CONTAINING ALCOHOL: NEVER

## 2024-09-11 ASSESSMENT — PAIN SCALES - GENERAL: PAINLEVEL_OUTOF10: 3

## 2024-09-12 LAB
ALBUMIN SERPL-MCNC: 3.6 G/DL (ref 3.4–5)
ANION GAP SERPL CALCULATED.3IONS-SCNC: 12 MMOL/L (ref 3–16)
BUN SERPL-MCNC: 52 MG/DL (ref 7–20)
CALCIUM SERPL-MCNC: 9.9 MG/DL (ref 8.3–10.6)
CHLORIDE SERPL-SCNC: 106 MMOL/L (ref 99–110)
CO2 SERPL-SCNC: 25 MMOL/L (ref 21–32)
CREAT SERPL-MCNC: 3.5 MG/DL (ref 0.6–1.2)
GFR SERPLBLD CREATININE-BSD FMLA CKD-EPI: 13 ML/MIN/{1.73_M2}
GLUCOSE SERPL-MCNC: 187 MG/DL (ref 70–99)
PHOSPHATE SERPL-MCNC: 3.7 MG/DL (ref 2.5–4.9)
POTASSIUM SERPL-SCNC: 4.8 MMOL/L (ref 3.5–5.1)
SODIUM SERPL-SCNC: 143 MMOL/L (ref 136–145)

## 2024-09-12 PROCEDURE — 2580000003 HC RX 258: Performed by: INTERNAL MEDICINE

## 2024-09-12 PROCEDURE — 6360000002 HC RX W HCPCS: Performed by: SURGERY

## 2024-09-12 PROCEDURE — G0378 HOSPITAL OBSERVATION PER HR: HCPCS

## 2024-09-12 PROCEDURE — 6370000000 HC RX 637 (ALT 250 FOR IP): Performed by: INTERNAL MEDICINE

## 2024-09-12 PROCEDURE — 36415 COLL VENOUS BLD VENIPUNCTURE: CPT

## 2024-09-12 PROCEDURE — 96372 THER/PROPH/DIAG INJ SC/IM: CPT

## 2024-09-12 PROCEDURE — 99232 SBSQ HOSP IP/OBS MODERATE 35: CPT | Performed by: INTERNAL MEDICINE

## 2024-09-12 PROCEDURE — 80069 RENAL FUNCTION PANEL: CPT

## 2024-09-12 PROCEDURE — 1200000000 HC SEMI PRIVATE

## 2024-09-12 RX ORDER — REGADENOSON 0.08 MG/ML
0.4 INJECTION, SOLUTION INTRAVENOUS
Status: COMPLETED | OUTPATIENT
Start: 2024-09-12 | End: 2024-09-13

## 2024-09-12 RX ADMIN — SODIUM BICARBONATE 1300 MG: 650 TABLET ORAL at 09:15

## 2024-09-12 RX ADMIN — ALLOPURINOL 100 MG: 100 TABLET ORAL at 09:15

## 2024-09-12 RX ADMIN — HEPARIN SODIUM 5000 UNITS: 5000 INJECTION INTRAVENOUS; SUBCUTANEOUS at 05:56

## 2024-09-12 RX ADMIN — HEPARIN SODIUM 5000 UNITS: 5000 INJECTION INTRAVENOUS; SUBCUTANEOUS at 14:53

## 2024-09-12 RX ADMIN — SODIUM BICARBONATE 1300 MG: 650 TABLET ORAL at 22:31

## 2024-09-12 RX ADMIN — Medication 1000 UNITS: at 09:15

## 2024-09-12 RX ADMIN — PANTOPRAZOLE SODIUM 40 MG: 40 TABLET, DELAYED RELEASE ORAL at 05:58

## 2024-09-12 RX ADMIN — METOPROLOL TARTRATE 25 MG: 25 TABLET, FILM COATED ORAL at 22:31

## 2024-09-12 RX ADMIN — HYDRALAZINE HYDROCHLORIDE 50 MG: 50 TABLET ORAL at 22:31

## 2024-09-12 RX ADMIN — NIFEDIPINE 60 MG: 60 TABLET, EXTENDED RELEASE ORAL at 09:15

## 2024-09-12 RX ADMIN — SODIUM CHLORIDE, PRESERVATIVE FREE 10 ML: 5 INJECTION INTRAVENOUS at 09:22

## 2024-09-12 RX ADMIN — Medication 3 MG: at 22:30

## 2024-09-12 RX ADMIN — METOPROLOL TARTRATE 25 MG: 25 TABLET, FILM COATED ORAL at 09:14

## 2024-09-12 RX ADMIN — PANTOPRAZOLE SODIUM 40 MG: 40 TABLET, DELAYED RELEASE ORAL at 14:52

## 2024-09-12 RX ADMIN — SODIUM CHLORIDE, PRESERVATIVE FREE 10 ML: 5 INJECTION INTRAVENOUS at 22:36

## 2024-09-12 RX ADMIN — HYDRALAZINE HYDROCHLORIDE 50 MG: 50 TABLET ORAL at 09:14

## 2024-09-12 RX ADMIN — ASPIRIN 81 MG: 81 TABLET, COATED ORAL at 09:15

## 2024-09-12 RX ADMIN — SODIUM ZIRCONIUM CYCLOSILICATE 10 G: 10 POWDER, FOR SUSPENSION ORAL at 09:15

## 2024-09-12 RX ADMIN — HEPARIN SODIUM 5000 UNITS: 5000 INJECTION INTRAVENOUS; SUBCUTANEOUS at 22:30

## 2024-09-12 RX ADMIN — SERTRALINE 50 MG: 50 TABLET, FILM COATED ORAL at 09:15

## 2024-09-12 ASSESSMENT — PAIN SCALES - GENERAL: PAINLEVEL_OUTOF10: 0

## 2024-09-13 ENCOUNTER — APPOINTMENT (OUTPATIENT)
Dept: NUCLEAR MEDICINE | Age: 74
DRG: 313 | End: 2024-09-13
Attending: SURGERY
Payer: MEDICARE

## 2024-09-13 ENCOUNTER — HOSPITAL ENCOUNTER (INPATIENT)
Age: 74
Discharge: HOME | DRG: 313 | End: 2024-09-15
Attending: SURGERY
Payer: MEDICARE

## 2024-09-13 VITALS
BODY MASS INDEX: 40.89 KG/M2 | TEMPERATURE: 98 F | OXYGEN SATURATION: 97 % | RESPIRATION RATE: 18 BRPM | HEART RATE: 68 BPM | DIASTOLIC BLOOD PRESSURE: 91 MMHG | SYSTOLIC BLOOD PRESSURE: 172 MMHG | HEIGHT: 60 IN | WEIGHT: 208.3 LBS

## 2024-09-13 LAB
ALBUMIN SERPL-MCNC: 3.4 G/DL (ref 3.4–5)
ANION GAP SERPL CALCULATED.3IONS-SCNC: 12 MMOL/L (ref 3–16)
BUN SERPL-MCNC: 54 MG/DL (ref 7–20)
CALCIUM SERPL-MCNC: 9.7 MG/DL (ref 8.3–10.6)
CHLORIDE SERPL-SCNC: 106 MMOL/L (ref 99–110)
CHOLEST SERPL-MCNC: 119 MG/DL (ref 0–199)
CO2 SERPL-SCNC: 24 MMOL/L (ref 21–32)
CREAT SERPL-MCNC: 3.3 MG/DL (ref 0.6–1.2)
ECHO BSA: 2.04 M2
GFR SERPLBLD CREATININE-BSD FMLA CKD-EPI: 14 ML/MIN/{1.73_M2}
GLUCOSE SERPL-MCNC: 103 MG/DL (ref 70–99)
HDLC SERPL-MCNC: 30 MG/DL (ref 40–60)
LDLC SERPL CALC-MCNC: 54 MG/DL
NUC STRESS EJECTION FRACTION: 75 %
NUC STRESS LV EDV: 106 ML (ref 56–104)
NUC STRESS LV ESV: 28 ML (ref 19–49)
NUC STRESS LV MASS: 144 G
PHOSPHATE SERPL-MCNC: 3.8 MG/DL (ref 2.5–4.9)
POTASSIUM SERPL-SCNC: 5 MMOL/L (ref 3.5–5.1)
SODIUM SERPL-SCNC: 142 MMOL/L (ref 136–145)
STRESS BASELINE DIAS BP: 84 MMHG
STRESS BASELINE HR: 70 BPM
STRESS BASELINE SYS BP: 190 MMHG
STRESS ESTIMATED WORKLOAD: 1 METS
STRESS EXERCISE DUR MIN: 1 MIN
STRESS EXERCISE DUR SEC: 0 SEC
STRESS PEAK DIAS BP: 80 MMHG
STRESS PEAK SYS BP: 169 MMHG
STRESS PERCENT HR ACHIEVED: 68 %
STRESS POST PEAK HR: 100 BPM
STRESS RATE PRESSURE PRODUCT: ABNORMAL BPM*MMHG
STRESS TARGET HR: 146 BPM
TID: 0.95
TRIGL SERPL-MCNC: 176 MG/DL (ref 0–150)
VLDLC SERPL CALC-MCNC: 35 MG/DL

## 2024-09-13 PROCEDURE — 80069 RENAL FUNCTION PANEL: CPT

## 2024-09-13 PROCEDURE — 36415 COLL VENOUS BLD VENIPUNCTURE: CPT

## 2024-09-13 PROCEDURE — 93018 CV STRESS TEST I&R ONLY: CPT | Performed by: INTERNAL MEDICINE

## 2024-09-13 PROCEDURE — 6370000000 HC RX 637 (ALT 250 FOR IP): Performed by: INTERNAL MEDICINE

## 2024-09-13 PROCEDURE — 99232 SBSQ HOSP IP/OBS MODERATE 35: CPT | Performed by: INTERNAL MEDICINE

## 2024-09-13 PROCEDURE — 78452 HT MUSCLE IMAGE SPECT MULT: CPT | Performed by: INTERNAL MEDICINE

## 2024-09-13 PROCEDURE — 93016 CV STRESS TEST SUPVJ ONLY: CPT | Performed by: INTERNAL MEDICINE

## 2024-09-13 PROCEDURE — 93017 CV STRESS TEST TRACING ONLY: CPT

## 2024-09-13 PROCEDURE — 80061 LIPID PANEL: CPT

## 2024-09-13 PROCEDURE — 78452 HT MUSCLE IMAGE SPECT MULT: CPT

## 2024-09-13 PROCEDURE — 6360000002 HC RX W HCPCS: Performed by: SURGERY

## 2024-09-13 PROCEDURE — 3430000000 HC RX DIAGNOSTIC RADIOPHARMACEUTICAL: Performed by: SURGERY

## 2024-09-13 PROCEDURE — G0378 HOSPITAL OBSERVATION PER HR: HCPCS

## 2024-09-13 PROCEDURE — 94660 CPAP INITIATION&MGMT: CPT

## 2024-09-13 PROCEDURE — 96372 THER/PROPH/DIAG INJ SC/IM: CPT

## 2024-09-13 PROCEDURE — A9502 TC99M TETROFOSMIN: HCPCS | Performed by: SURGERY

## 2024-09-13 RX ADMIN — METOPROLOL TARTRATE 25 MG: 25 TABLET, FILM COATED ORAL at 14:58

## 2024-09-13 RX ADMIN — TETROFOSMIN 28.3 MILLICURIE: 1.38 INJECTION, POWDER, LYOPHILIZED, FOR SOLUTION INTRAVENOUS at 14:02

## 2024-09-13 RX ADMIN — NIFEDIPINE 60 MG: 60 TABLET, EXTENDED RELEASE ORAL at 14:58

## 2024-09-13 RX ADMIN — ASPIRIN 81 MG: 81 TABLET, COATED ORAL at 14:58

## 2024-09-13 RX ADMIN — HEPARIN SODIUM 5000 UNITS: 5000 INJECTION INTRAVENOUS; SUBCUTANEOUS at 07:43

## 2024-09-13 RX ADMIN — PANTOPRAZOLE SODIUM 40 MG: 40 TABLET, DELAYED RELEASE ORAL at 07:43

## 2024-09-13 RX ADMIN — ACETAMINOPHEN 650 MG: 325 TABLET ORAL at 14:57

## 2024-09-13 RX ADMIN — REGADENOSON 0.4 MG: 0.08 INJECTION, SOLUTION INTRAVENOUS at 14:02

## 2024-09-13 RX ADMIN — Medication 1000 UNITS: at 14:58

## 2024-09-13 RX ADMIN — HYDRALAZINE HYDROCHLORIDE 50 MG: 50 TABLET ORAL at 14:58

## 2024-09-13 RX ADMIN — SODIUM BICARBONATE 1300 MG: 650 TABLET ORAL at 14:57

## 2024-09-13 RX ADMIN — SODIUM ZIRCONIUM CYCLOSILICATE 10 G: 10 POWDER, FOR SUSPENSION ORAL at 14:58

## 2024-09-13 RX ADMIN — SERTRALINE 50 MG: 50 TABLET, FILM COATED ORAL at 14:58

## 2024-09-13 RX ADMIN — ALLOPURINOL 100 MG: 100 TABLET ORAL at 14:58

## 2024-09-13 RX ADMIN — TETROFOSMIN 10.6 MILLICURIE: 1.38 INJECTION, POWDER, LYOPHILIZED, FOR SOLUTION INTRAVENOUS at 11:37

## 2024-09-13 ASSESSMENT — PAIN DESCRIPTION - DESCRIPTORS: DESCRIPTORS: ACHING

## 2024-09-13 ASSESSMENT — PAIN - FUNCTIONAL ASSESSMENT: PAIN_FUNCTIONAL_ASSESSMENT: PREVENTS OR INTERFERES SOME ACTIVE ACTIVITIES AND ADLS

## 2024-09-13 ASSESSMENT — PAIN DESCRIPTION - ONSET: ONSET: ON-GOING

## 2024-09-13 ASSESSMENT — PAIN DESCRIPTION - ORIENTATION: ORIENTATION: MID

## 2024-09-13 ASSESSMENT — PAIN DESCRIPTION - DIRECTION: RADIATING_TOWARDS: NO

## 2024-09-13 ASSESSMENT — PAIN DESCRIPTION - FREQUENCY: FREQUENCY: INTERMITTENT

## 2024-09-13 ASSESSMENT — PAIN DESCRIPTION - PAIN TYPE: TYPE: ACUTE PAIN

## 2024-09-13 ASSESSMENT — PAIN SCALES - GENERAL: PAINLEVEL_OUTOF10: 3

## 2024-09-13 ASSESSMENT — PAIN DESCRIPTION - LOCATION: LOCATION: HEAD

## 2024-09-20 ENCOUNTER — OFFICE VISIT (OUTPATIENT)
Dept: CARDIOLOGY CLINIC | Age: 74
End: 2024-09-20
Payer: MEDICARE

## 2024-09-20 VITALS
SYSTOLIC BLOOD PRESSURE: 110 MMHG | BODY MASS INDEX: 38.47 KG/M2 | OXYGEN SATURATION: 92 % | DIASTOLIC BLOOD PRESSURE: 60 MMHG | HEART RATE: 73 BPM | WEIGHT: 197 LBS

## 2024-09-20 DIAGNOSIS — I25.10 CORONARY ARTERY DISEASE INVOLVING NATIVE CORONARY ARTERY OF NATIVE HEART WITHOUT ANGINA PECTORIS: Primary | ICD-10-CM

## 2024-09-20 DIAGNOSIS — E55.9 VITAMIN D DEFICIENCY: ICD-10-CM

## 2024-09-20 DIAGNOSIS — E78.00 PURE HYPERCHOLESTEROLEMIA: ICD-10-CM

## 2024-09-20 DIAGNOSIS — R60.0 LOCALIZED EDEMA: ICD-10-CM

## 2024-09-20 DIAGNOSIS — R07.9 CHEST PAIN, UNSPECIFIED TYPE: ICD-10-CM

## 2024-09-20 PROCEDURE — 3078F DIAST BP <80 MM HG: CPT | Performed by: NURSE PRACTITIONER

## 2024-09-20 PROCEDURE — 3074F SYST BP LT 130 MM HG: CPT | Performed by: NURSE PRACTITIONER

## 2024-09-20 PROCEDURE — 99214 OFFICE O/P EST MOD 30 MIN: CPT | Performed by: NURSE PRACTITIONER

## 2024-09-20 PROCEDURE — 1123F ACP DISCUSS/DSCN MKR DOCD: CPT | Performed by: NURSE PRACTITIONER

## 2024-09-20 RX ORDER — METOPROLOL SUCCINATE 50 MG/1
50 TABLET, EXTENDED RELEASE ORAL DAILY
Qty: 90 TABLET | Refills: 3 | Status: SHIPPED | OUTPATIENT
Start: 2024-09-20

## 2024-10-23 ENCOUNTER — HOSPITAL ENCOUNTER (EMERGENCY)
Age: 74
Discharge: HOME | End: 2024-10-23
Attending: EMERGENCY MEDICINE
Payer: MEDICARE

## 2024-10-23 VITALS
HEART RATE: 85 BPM | BODY MASS INDEX: 35.56 KG/M2 | TEMPERATURE: 98.5 F | OXYGEN SATURATION: 96 % | SYSTOLIC BLOOD PRESSURE: 149 MMHG | HEIGHT: 65 IN | WEIGHT: 213.41 LBS | RESPIRATION RATE: 18 BRPM | DIASTOLIC BLOOD PRESSURE: 69 MMHG

## 2024-10-23 DIAGNOSIS — L29.9 PRURITIC DISORDER: Primary | ICD-10-CM

## 2024-10-23 LAB
ALBUMIN SERPL-MCNC: 4.1 G/DL (ref 3.4–5)
ALP SERPL-CCNC: 98 U/L (ref 40–129)
ALT SERPL-CCNC: 11 U/L (ref 10–40)
ANION GAP SERPL CALCULATED.3IONS-SCNC: 13 MMOL/L (ref 3–16)
AST SERPL-CCNC: 25 U/L (ref 15–37)
BASOPHILS # BLD: 0 K/UL (ref 0–0.2)
BASOPHILS NFR BLD: 0.6 %
BILIRUB DIRECT SERPL-MCNC: 0.2 MG/DL (ref 0–0.3)
BILIRUB INDIRECT SERPL-MCNC: NORMAL MG/DL (ref 0–1)
BILIRUB SERPL-MCNC: <0.2 MG/DL (ref 0–1)
BUN SERPL-MCNC: 43 MG/DL (ref 7–20)
CALCIUM SERPL-MCNC: 10.1 MG/DL (ref 8.3–10.6)
CHLORIDE SERPL-SCNC: 100 MMOL/L (ref 99–110)
CO2 SERPL-SCNC: 24 MMOL/L (ref 21–32)
CREAT SERPL-MCNC: 3.2 MG/DL (ref 0.6–1.2)
DEPRECATED RDW RBC AUTO: 17.4 % (ref 12.4–15.4)
EOSINOPHIL # BLD: 0.2 K/UL (ref 0–0.6)
EOSINOPHIL NFR BLD: 2.2 %
GFR SERPLBLD CREATININE-BSD FMLA CKD-EPI: 15 ML/MIN/{1.73_M2}
GLUCOSE SERPL-MCNC: 118 MG/DL (ref 70–99)
HCT VFR BLD AUTO: 29.7 % (ref 36–48)
HGB BLD-MCNC: 9.5 G/DL (ref 12–16)
LYMPHOCYTES # BLD: 1.8 K/UL (ref 1–5.1)
LYMPHOCYTES NFR BLD: 24 %
MCH RBC QN AUTO: 29.3 PG (ref 26–34)
MCHC RBC AUTO-ENTMCNC: 32 G/DL (ref 31–36)
MCV RBC AUTO: 91.7 FL (ref 80–100)
MONOCYTES # BLD: 0.9 K/UL (ref 0–1.3)
MONOCYTES NFR BLD: 12.1 %
NEUTROPHILS # BLD: 4.7 K/UL (ref 1.7–7.7)
NEUTROPHILS NFR BLD: 61.1 %
PLATELET # BLD AUTO: 333 K/UL (ref 135–450)
PMV BLD AUTO: 7.6 FL (ref 5–10.5)
POTASSIUM SERPL-SCNC: 5.2 MMOL/L (ref 3.5–5.1)
PROT SERPL-MCNC: 8.1 G/DL (ref 6.4–8.2)
RBC # BLD AUTO: 3.24 M/UL (ref 4–5.2)
SODIUM SERPL-SCNC: 137 MMOL/L (ref 136–145)
WBC # BLD AUTO: 7.6 K/UL (ref 4–11)

## 2024-10-23 PROCEDURE — 85025 COMPLETE CBC W/AUTO DIFF WBC: CPT

## 2024-10-23 PROCEDURE — 80048 BASIC METABOLIC PNL TOTAL CA: CPT

## 2024-10-23 PROCEDURE — 99283 EMERGENCY DEPT VISIT LOW MDM: CPT

## 2024-10-23 PROCEDURE — 80076 HEPATIC FUNCTION PANEL: CPT

## 2024-10-23 RX ORDER — CETIRIZINE HYDROCHLORIDE 10 MG/1
5 TABLET ORAL DAILY PRN
Qty: 14 TABLET | Refills: 0 | Status: SHIPPED | OUTPATIENT
Start: 2024-10-23

## 2024-10-23 ASSESSMENT — PAIN - FUNCTIONAL ASSESSMENT: PAIN_FUNCTIONAL_ASSESSMENT: NONE - DENIES PAIN

## 2024-10-24 NOTE — ED PROVIDER NOTES
THE [facility]  EMERGENCY DEPARTMENT ENCOUNTER          ATTENDING PHYSICIAN NOTE       Date of evaluation: 10/23/2024      Assessment/ Medical Decion Making     MDM: Fozia Boland is a 74 y.o. female with history of CKD, recent GI bleed with new initiation of pantoprazole who presents for evaluation of pruritus without associated rash.    Hypersensitivity is considered given onset of symptoms following initiation of pantoprazole but she has no rash or other symptoms to suggest allergic reaction so would consider this a diagnosis of exclusion.  At this point I think the benefits of pantoprazole with recent GI bleed outweigh stopping this to see if itching resolves.  Patient agrees so we will continue this and investigate and treat for other potential causes.    With her comorbid conditions labs were obtained which did not demonstrate any change in her baseline kidney disease or hepatic dysfunction.  Her hemoglobin is also improved from hospital discharge.    She has no evidence of scabies or bites to suggest bedbugs or other infestation is the cause of her itchiness.  Also has no household contacts with similar symptoms which is further suggestive against infestation.    She also has no physical evidence of yeast or eczematous or psoriasiform rash.  Her skin is dry today because she did not complete her typical skin care regimen.    We agreed on trial of supportive care to include lukewarm bathing and limiting soap to intertriginous areas like the axilla and groin as well as hypoallergenic moisturizer to see if this helps with symptoms.  She has been taking diphenhydramine but will switch to cetirizine for better side effect profile which was renally dosed.  She may require up titration of this if symptoms are not improved.  We did discuss potential anticholinergic side effects.    Patient is established with a primary care provider and is comfortable with discharge and continued outpatient

## 2024-10-24 NOTE — DISCHARGE INSTRUCTIONS
You were seen today for itching. You do not have signs of rash or bites. I do not think this is anything like bedbugs or scabies. Your bloodwork also looked okay and you do not have signs of worsening kidney disease or any liver disease as a cause.     You were given discharge instructions about how to care for skin with ezcema. You do not appear to have eczema, but the  instruction for skin care in the handout are good options for you to try. You were also given a prescription for cetirizine to see if this helps. Starting dose is lower because of your kidney disease, so your doctor may increase this if you do not have any change. The pantoprazole you were given may cause itching, but cause you do not have any rash or other signs of allergic reaction, it is better to keep taking this because of your recent bleed.

## 2024-11-11 ENCOUNTER — HOSPITAL ENCOUNTER (OUTPATIENT)
Age: 74
Discharge: HOME | End: 2024-11-13
Payer: MEDICARE

## 2024-11-11 VITALS — DIASTOLIC BLOOD PRESSURE: 69 MMHG | SYSTOLIC BLOOD PRESSURE: 149 MMHG

## 2024-11-11 DIAGNOSIS — I25.10 CORONARY ARTERY DISEASE INVOLVING NATIVE CORONARY ARTERY OF NATIVE HEART WITHOUT ANGINA PECTORIS: ICD-10-CM

## 2024-11-11 DIAGNOSIS — R07.9 CHEST PAIN, UNSPECIFIED TYPE: ICD-10-CM

## 2024-11-11 LAB
ECHO AO ASC DIAM: 3.1 CM
ECHO AO ROOT DIAM: 2.4 CM
ECHO AV AREA PEAK VELOCITY: 0.8 CM2
ECHO AV AREA VTI: 0.8 CM2
ECHO AV MEAN GRADIENT: 23 MMHG
ECHO AV MEAN VELOCITY: 2.3 M/S
ECHO AV PEAK GRADIENT: 47 MMHG
ECHO AV PEAK VELOCITY: 3.4 M/S
ECHO AV VELOCITY RATIO: 0.32
ECHO AV VTI: 76.5 CM
ECHO IVC PROX: 2.3 CM
ECHO LA AREA 2C: 14 CM2
ECHO LA AREA 4C: 20 CM2
ECHO LA DIAMETER: 4 CM
ECHO LA MAJOR AXIS: 5.9 CM
ECHO LA MINOR AXIS: 4.5 CM
ECHO LA TO AORTIC ROOT RATIO: 1.67
ECHO LA VOL MOD A2C: 35 ML (ref 22–52)
ECHO LA VOL MOD A4C: 55 ML (ref 22–52)
ECHO LV E' LATERAL VELOCITY: 8.9 CM/S
ECHO LV E' SEPTAL VELOCITY: 7 CM/S
ECHO LV EDV A2C: 139 ML
ECHO LV EDV A4C: 137 ML
ECHO LV EJECTION FRACTION A2C: 48 %
ECHO LV EJECTION FRACTION A4C: 67 %
ECHO LV EJECTION FRACTION BIPLANE: 58 % (ref 55–100)
ECHO LV ESV A2C: 72 ML
ECHO LV ESV A4C: 45 ML
ECHO LV FRACTIONAL SHORTENING: 31 % (ref 28–44)
ECHO LV INTERNAL DIMENSION DIASTOLIC: 4.5 CM (ref 3.9–5.3)
ECHO LV INTERNAL DIMENSION SYSTOLIC: 3.1 CM
ECHO LV IVSD: 1.2 CM (ref 0.6–0.9)
ECHO LV MASS 2D: 222.6 G (ref 67–162)
ECHO LV POSTERIOR WALL DIASTOLIC: 1.4 CM (ref 0.6–0.9)
ECHO LV RELATIVE WALL THICKNESS RATIO: 0.62
ECHO LVOT AREA: 2.3 CM2
ECHO LVOT AV VTI INDEX: 0.35
ECHO LVOT DIAM: 1.7 CM
ECHO LVOT MEAN GRADIENT: 3 MMHG
ECHO LVOT PEAK GRADIENT: 5 MMHG
ECHO LVOT PEAK VELOCITY: 1.1 M/S
ECHO LVOT SV: 60.3 ML
ECHO LVOT VTI: 26.6 CM
ECHO MV A VELOCITY: 1.59 M/S
ECHO MV AREA VTI: 1.2 CM2
ECHO MV E VELOCITY: 1.45 M/S
ECHO MV E/A RATIO: 0.91
ECHO MV E/E' LATERAL: 16.29
ECHO MV E/E' RATIO (AVERAGED): 18.5
ECHO MV E/E' SEPTAL: 20.71
ECHO MV LVOT VTI INDEX: 1.95
ECHO MV MAX VELOCITY: 1.7 M/S
ECHO MV MEAN GRADIENT: 5 MMHG
ECHO MV MEAN VELOCITY: 1.1 M/S
ECHO MV PEAK GRADIENT: 12 MMHG
ECHO MV VTI: 51.8 CM
ECHO PV MAX VELOCITY: 1.1 M/S
ECHO PV MEAN GRADIENT: 3 MMHG
ECHO PV MEAN VELOCITY: 0.8 M/S
ECHO PV PEAK GRADIENT: 5 MMHG
ECHO PV VTI: 25.5 CM
ECHO RA AREA 4C: 18.5 CM2
ECHO RA VOLUME: 56 ML
ECHO RV BASAL DIMENSION: 3.5 CM
ECHO RV FREE WALL PEAK S': 14.3 CM/S
ECHO RV LONGITUDINAL DIMENSION: 7.2 CM
ECHO RV MID DIMENSION: 2.7 CM
ECHO RV TAPSE: 2.8 CM (ref 1.7–?)

## 2024-11-11 PROCEDURE — C8929 TTE W OR WO FOL WCON,DOPPLER: HCPCS

## 2024-11-11 PROCEDURE — 6360000004 HC RX CONTRAST MEDICATION: Performed by: NURSE PRACTITIONER

## 2024-11-11 RX ADMIN — PERFLUTREN 1.5 ML: 6.52 INJECTION, SUSPENSION INTRAVENOUS at 11:09

## 2024-11-21 RX ORDER — EVOLOCUMAB 140 MG/ML
INJECTION, SOLUTION SUBCUTANEOUS
Qty: 2 ML | Refills: 3 | Status: SHIPPED | OUTPATIENT
Start: 2024-11-21

## 2024-11-21 NOTE — TELEPHONE ENCOUNTER
Requested Prescriptions     Pending Prescriptions Disp Refills    REPATHA SURECLICK 140 MG/ML SOAJ [Pharmacy Med Name: REPATHA SRCLK 140MG/ML PF AUTO INJ] 2 mL 3     Sig: INJECT 1 PEN UNDER THE SKIN EVERY 14 DAYS            Checked Correct Pharmacy: Yes    Any changes since last refill? No     Number: 4    Refills: 3    Last Office Visit: 09.20.2024    Next Office Visit: 01.16.2025  Last Labs: 10.23.2024

## 2024-12-15 ENCOUNTER — HOSPITAL ENCOUNTER (EMERGENCY)
Age: 74
Discharge: HOME | End: 2024-12-15
Attending: EMERGENCY MEDICINE
Payer: MEDICARE

## 2024-12-15 VITALS
DIASTOLIC BLOOD PRESSURE: 73 MMHG | RESPIRATION RATE: 17 BRPM | SYSTOLIC BLOOD PRESSURE: 131 MMHG | OXYGEN SATURATION: 93 % | TEMPERATURE: 97.9 F | HEART RATE: 80 BPM

## 2024-12-15 DIAGNOSIS — K62.5 RECTAL BLEEDING: Primary | ICD-10-CM

## 2024-12-15 DIAGNOSIS — K64.8 INTERNAL HEMORRHOIDS: ICD-10-CM

## 2024-12-15 LAB
BASOPHILS # BLD: 0 K/UL (ref 0–0.2)
BASOPHILS NFR BLD: 0.7 %
DEPRECATED RDW RBC AUTO: 15.8 % (ref 12.4–15.4)
EOSINOPHIL # BLD: 0.1 K/UL (ref 0–0.6)
EOSINOPHIL NFR BLD: 1.9 %
HCT VFR BLD AUTO: 31.9 % (ref 36–48)
HEMOCCULT STL QL: POSITIVE
HGB BLD-MCNC: 10 G/DL (ref 12–16)
LYMPHOCYTES # BLD: 1.4 K/UL (ref 1–5.1)
LYMPHOCYTES NFR BLD: 19.6 %
MCH RBC QN AUTO: 27.4 PG (ref 26–34)
MCHC RBC AUTO-ENTMCNC: 31.2 G/DL (ref 31–36)
MCV RBC AUTO: 87.8 FL (ref 80–100)
MONOCYTES # BLD: 0.8 K/UL (ref 0–1.3)
MONOCYTES NFR BLD: 11.7 %
NEUTROPHILS # BLD: 4.6 K/UL (ref 1.7–7.7)
NEUTROPHILS NFR BLD: 66.1 %
PLATELET # BLD AUTO: 337 K/UL (ref 135–450)
PMV BLD AUTO: 7.4 FL (ref 5–10.5)
RBC # BLD AUTO: 3.63 M/UL (ref 4–5.2)
WBC # BLD AUTO: 7 K/UL (ref 4–11)

## 2024-12-15 PROCEDURE — 82272 OCCULT BLD FECES 1-3 TESTS: CPT

## 2024-12-15 PROCEDURE — 85025 COMPLETE CBC W/AUTO DIFF WBC: CPT

## 2024-12-15 PROCEDURE — 99283 EMERGENCY DEPT VISIT LOW MDM: CPT

## 2024-12-15 ASSESSMENT — PAIN - FUNCTIONAL ASSESSMENT: PAIN_FUNCTIONAL_ASSESSMENT: NONE - DENIES PAIN

## 2024-12-15 NOTE — ED PROVIDER NOTES
breath.    Gastrointestinal:  Positive for anal bleeding and blood in stool. Negative for abdominal pain, diarrhea, nausea and vomiting.   All other systems reviewed and are negative.      Past Medical, Surgical, Family, and Social History     She has a past medical history of CAD (coronary artery disease), Diabetes mellitus (HCC), Essential hypertension, Gout, and Hyperlipidemia.  She has a past surgical history that includes angioplasty (); Cholecystectomy; Appendectomy;  section; Breast biopsy; hernia repair; CT BIOPSY RENAL (2021); Upper gastrointestinal endoscopy (N/A, 2022); Colonoscopy (N/A, 2022); and Upper gastrointestinal endoscopy (N/A, 2024).  Her family history is not on file.  She reports that she has never smoked. She has never used smokeless tobacco. She reports that she does not drink alcohol and does not use drugs.    Medications     Current Discharge Medication List        CONTINUE these medications which have NOT CHANGED    Details   REPATHA SURECLICK 140 MG/ML SOAJ INJECT 1 PEN UNDER THE SKIN EVERY 14 DAYS  Qty: 2 mL, Refills: 3      cetirizine (ZYRTEC) 10 MG tablet Take 0.5 tablets by mouth daily as needed (severe itching)  Qty: 14 tablet, Refills: 0      metoprolol succinate (TOPROL XL) 50 MG extended release tablet Take 1 tablet by mouth daily  Qty: 90 tablet, Refills: 3      pantoprazole (PROTONIX) 40 MG tablet Take 1 tablet by mouth 2 times daily (before meals) for 60 doses  Qty: 60 tablet, Refills: 0      lidocaine viscous hcl (XYLOCAINE) 2 % SOLN solution Take 15 mLs by mouth every 3 hours as needed for Irritation  Qty: 15 mL, Refills: 0      lidocaine (LIDODERM) 5 % Place 1 patch onto the skin daily 12 hours on, 12 hours off.  Qty: 30 patch, Refills: 0    Associated Diagnoses: Acute pain of right knee      Acetaminophen 325 MG CAPS Take by mouth      aspirin 81 MG EC tablet Take 1 tablet by mouth daily  Qty: 30 tablet, Refills: 3      sodium bicarbonate

## 2025-01-21 ENCOUNTER — TELEPHONE (OUTPATIENT)
Dept: CARDIOLOGY CLINIC | Age: 75
End: 2025-01-21

## 2025-01-21 NOTE — TELEPHONE ENCOUNTER
Halley - pharmacy Tech from [facility] called and stated the patient needs a prior auth for REPATHA SURECLICK 140 MG/ML SOAJ.  She stated she needed the most recent office notes and lipid panel faxed over to her.  Fax number: [phone]   Information was faxed over today.

## 2025-01-28 RX ORDER — EVOLOCUMAB 140 MG/ML
INJECTION, SOLUTION SUBCUTANEOUS
Qty: 2 ML | Refills: 3 | Status: SHIPPED | OUTPATIENT
Start: 2025-01-28

## 2025-01-30 ENCOUNTER — TELEPHONE (OUTPATIENT)
Dept: ADMINISTRATIVE | Age: 75
End: 2025-01-30

## 2025-01-30 NOTE — TELEPHONE ENCOUNTER
Submitted PA for Repatha SureClick 140MG/ML auto-injectors   Via CMM Key: [id number]  STATUS: PENDING.    Follow up done daily; if no decision with in three days we will refax.  If another three days goes by with no decision will call the insurance for status.

## 2025-02-06 NOTE — TELEPHONE ENCOUNTER
Called [employer] for a status check, spoke with meghan who states pt does not have active medical coverage with this plan.  Please verify pts coverage

## 2025-02-06 NOTE — TELEPHONE ENCOUNTER
Called patient and she states that she has had [employer] since 2015. Let me know what I need the patient to do ?

## 2025-02-07 NOTE — TELEPHONE ENCOUNTER
Pt will need to call the insurance company and see what the issues is unfortunately.  The message below was from the insurance company

## 2025-02-10 RX ORDER — TICAGRELOR 60 MG/1
TABLET ORAL
Qty: 180 TABLET | Refills: 2 | Status: SHIPPED | OUTPATIENT
Start: 2025-02-10

## 2025-03-17 NOTE — PROGRESS NOTES
[facility]     Outpatient Cardiology         Patient Name:  Fozia Boland  Primary Care Physician: Shima Vizcarra MD  25     Assessment & Plan    Assessment / Plan:     ***              Chief Complaint:     No chief complaint on file.      History of Present Illness:       HPI     Fozia Boland is a 74 y.o. female with PMH of anemia/GI bleed/AVMs, sleep apnea,  coronary artery disease s/p PCI 2016 of the RCA, hypertension, diabetes, and hyperlipidemia. Previously followed with Dr. Root.       Here for ***       Patient denies any chest pain, shortness of breath, palpitations, presyncope or syncope. No TIA. No claudication. No recent hospitalizations    PMH  Past Medical History:   Diagnosis Date    CAD (coronary artery disease) 10/20/2016    Distal RCA 3.0 x 15 mm [employer] CRISTEL    Diabetes mellitus (HCC)     Essential hypertension     Gout     Hyperlipidemia        PSH  Past Surgical History:   Procedure Laterality Date    ANGIOPLASTY      GS    APPENDECTOMY      BREAST BIOPSY       SECTION      CHOLECYSTECTOMY      COLONOSCOPY N/A 2022    COLONOSCOPY POLYPECTOMY SNARE/COLD BIOPSY performed by Luca Hollis MD at [facility] ENDOSCOPY    CT BIOPSY RENAL  2021    CT BIOPSY RENAL 2021 [facility] CT SCAN    HERNIA REPAIR      UPPER GASTROINTESTINAL ENDOSCOPY N/A 2022    EGD BIOPSY performed by Luca Hollis MD at [facility] ENDOSCOPY    UPPER GASTROINTESTINAL ENDOSCOPY N/A 2024    ESOPHAGOGASTRODUODENOSCOPY CONTROL HEMORRHAGE performed by Darryn Olvera MD at [facility] ENDOSCOPY        Social HIstory  Social History     Tobacco Use    Smoking status: Never    Smokeless tobacco: Never   Vaping Use    Vaping status: Never Used   Substance Use Topics    Alcohol use: No    Drug use: Never       Family History  No family history on file.    Allergies   Allergies   Allergen Reactions    Statins Other (See Comments)     Unknown reaction

## 2025-03-19 ENCOUNTER — OFFICE VISIT (OUTPATIENT)
Dept: CARDIOLOGY CLINIC | Age: 75
End: 2025-03-19
Payer: MEDICARE

## 2025-03-19 VITALS
HEART RATE: 78 BPM | DIASTOLIC BLOOD PRESSURE: 72 MMHG | HEIGHT: 65 IN | WEIGHT: 213 LBS | BODY MASS INDEX: 35.49 KG/M2 | SYSTOLIC BLOOD PRESSURE: 116 MMHG

## 2025-03-19 DIAGNOSIS — I25.10 CORONARY ARTERY DISEASE INVOLVING NATIVE CORONARY ARTERY OF NATIVE HEART WITHOUT ANGINA PECTORIS: Primary | ICD-10-CM

## 2025-03-19 DIAGNOSIS — I35.0 NONRHEUMATIC AORTIC VALVE STENOSIS: ICD-10-CM

## 2025-03-19 DIAGNOSIS — E78.5 HYPERLIPIDEMIA LDL GOAL <70: ICD-10-CM

## 2025-03-19 PROCEDURE — 99214 OFFICE O/P EST MOD 30 MIN: CPT | Performed by: INTERNAL MEDICINE

## 2025-03-19 PROCEDURE — 3074F SYST BP LT 130 MM HG: CPT | Performed by: INTERNAL MEDICINE

## 2025-03-19 PROCEDURE — 3078F DIAST BP <80 MM HG: CPT | Performed by: INTERNAL MEDICINE

## 2025-03-19 PROCEDURE — 1123F ACP DISCUSS/DSCN MKR DOCD: CPT | Performed by: INTERNAL MEDICINE

## 2025-03-19 PROCEDURE — G2211 COMPLEX E/M VISIT ADD ON: HCPCS | Performed by: INTERNAL MEDICINE

## 2025-03-19 RX ORDER — FLUTICASONE PROPIONATE 50 MCG
2 SPRAY, SUSPENSION (ML) NASAL DAILY
COMMUNITY
Start: 2024-07-09

## 2025-03-19 NOTE — PATIENT INSTRUCTIONS
Thank you for choosing [facility] for your cardiac care.    During your visit today, we reviewed and confirmed your cardiac medications along with  medication prescribed by your other healthcare team members. Please be sure to discuss any  changes to medication with your providers.    Please bring a list of ALL medications (or the bottles) with you to EVERY appointment.  Also include vitamins and over-the-counter medications.    If you need refills for any cardiac medications, please call your pharmacy and they will reach out to us electronically.    Did your provider order testing today? If yes, then you will receive your results in three  possible ways. You can receive a [employer] message, a phone call, or letter in the mail. Please  note, if you are an active [employer] user, some of your testing will be available within 1-2 days.    Finally, please know that it is good for your heart to exercise and follow a healthy, low-fat diet  as advised by your physician and health care providers.    If you are experiencing a medical emergency, please call 911 immediately.    It's easy to register for a [employer] account if you don't already have one. With a [employer]  account you can manage your health record, view test results, schedule appointments and  more.     Dr. Tian's clinical staff can be reached at the following phone number: [phone]    If any cardiac testing is ordered, please contact central scheduling at [phone] to get your test scheduled.        No more than 64 oz of fluids a day

## 2025-04-08 ENCOUNTER — APPOINTMENT (OUTPATIENT)
Dept: GENERAL RADIOLOGY | Age: 75
End: 2025-04-08
Payer: MEDICARE

## 2025-04-08 ENCOUNTER — HOSPITAL ENCOUNTER (EMERGENCY)
Age: 75
Discharge: HOME | End: 2025-04-08
Attending: EMERGENCY MEDICINE
Payer: MEDICARE

## 2025-04-08 VITALS
OXYGEN SATURATION: 97 % | HEIGHT: 60 IN | BODY MASS INDEX: 41.82 KG/M2 | SYSTOLIC BLOOD PRESSURE: 159 MMHG | DIASTOLIC BLOOD PRESSURE: 71 MMHG | HEART RATE: 79 BPM | WEIGHT: 213 LBS | TEMPERATURE: 97.9 F | RESPIRATION RATE: 18 BRPM

## 2025-04-08 DIAGNOSIS — R42 LIGHTHEADEDNESS: Primary | ICD-10-CM

## 2025-04-08 DIAGNOSIS — Z86.2 HISTORY OF IRON DEFICIENCY ANEMIA: ICD-10-CM

## 2025-04-08 LAB
ANION GAP SERPL CALCULATED.3IONS-SCNC: 16 MMOL/L (ref 3–16)
BACTERIA URNS QL MICRO: ABNORMAL /HPF
BASOPHILS # BLD: 0 K/UL (ref 0–0.2)
BASOPHILS NFR BLD: 0.6 %
BILIRUB UR QL STRIP.AUTO: NEGATIVE
BUN SERPL-MCNC: 48 MG/DL (ref 7–20)
CALCIUM SERPL-MCNC: 10.5 MG/DL (ref 8.3–10.6)
CHLORIDE SERPL-SCNC: 104 MMOL/L (ref 99–110)
CLARITY UR: CLEAR
CO2 SERPL-SCNC: 22 MMOL/L (ref 21–32)
COLOR UR: YELLOW
CREAT SERPL-MCNC: 3.1 MG/DL (ref 0.6–1.2)
DEPRECATED RDW RBC AUTO: 16.9 % (ref 12.4–15.4)
EKG ATRIAL RATE: 77 BPM
EKG DIAGNOSIS: NORMAL
EKG P AXIS: 50 DEGREES
EKG P-R INTERVAL: 180 MS
EKG Q-T INTERVAL: 402 MS
EKG QRS DURATION: 78 MS
EKG QTC CALCULATION (BAZETT): 454 MS
EKG R AXIS: -8 DEGREES
EKG T AXIS: 37 DEGREES
EKG VENTRICULAR RATE: 77 BPM
EOSINOPHIL # BLD: 0.2 K/UL (ref 0–0.6)
EOSINOPHIL NFR BLD: 2.9 %
EPI CELLS #/AREA URNS HPF: ABNORMAL /HPF (ref 0–5)
GFR SERPLBLD CREATININE-BSD FMLA CKD-EPI: 15 ML/MIN/{1.73_M2}
GLUCOSE SERPL-MCNC: 114 MG/DL (ref 70–99)
GLUCOSE UR STRIP.AUTO-MCNC: NEGATIVE MG/DL
HCT VFR BLD AUTO: 34.3 % (ref 36–48)
HGB BLD-MCNC: 11.4 G/DL (ref 12–16)
HGB UR QL STRIP.AUTO: NEGATIVE
KETONES UR STRIP.AUTO-MCNC: NEGATIVE MG/DL
LEUKOCYTE ESTERASE UR QL STRIP.AUTO: NEGATIVE
LYMPHOCYTES # BLD: 1.3 K/UL (ref 1–5.1)
LYMPHOCYTES NFR BLD: 19.1 %
MCH RBC QN AUTO: 29.8 PG (ref 26–34)
MCHC RBC AUTO-ENTMCNC: 33.2 G/DL (ref 31–36)
MCV RBC AUTO: 89.8 FL (ref 80–100)
MONOCYTES # BLD: 0.7 K/UL (ref 0–1.3)
MONOCYTES NFR BLD: 9.5 %
NEUTROPHILS # BLD: 4.8 K/UL (ref 1.7–7.7)
NEUTROPHILS NFR BLD: 67.9 %
NITRITE UR QL STRIP.AUTO: NEGATIVE
PH UR STRIP.AUTO: 6.5 [PH] (ref 5–8)
PLATELET # BLD AUTO: 308 K/UL (ref 135–450)
PMV BLD AUTO: 7.4 FL (ref 5–10.5)
POTASSIUM SERPL-SCNC: 4.8 MMOL/L (ref 3.5–5.1)
PROT UR STRIP.AUTO-MCNC: 100 MG/DL
RBC # BLD AUTO: 3.82 M/UL (ref 4–5.2)
RBC #/AREA URNS HPF: ABNORMAL /HPF (ref 0–4)
SODIUM SERPL-SCNC: 142 MMOL/L (ref 136–145)
SP GR UR STRIP.AUTO: 1.02 (ref 1–1.03)
TROPONIN, HIGH SENSITIVITY: 56 NG/L (ref 0–14)
TROPONIN, HIGH SENSITIVITY: 61 NG/L (ref 0–14)
UA DIPSTICK W REFLEX MICRO PNL UR: YES
URN SPEC COLLECT METH UR: ABNORMAL
UROBILINOGEN UR STRIP-ACNC: 0.2 E.U./DL
WBC # BLD AUTO: 7 K/UL (ref 4–11)
WBC #/AREA URNS HPF: ABNORMAL /HPF (ref 0–5)

## 2025-04-08 PROCEDURE — 71046 X-RAY EXAM CHEST 2 VIEWS: CPT

## 2025-04-08 PROCEDURE — 93005 ELECTROCARDIOGRAM TRACING: CPT | Performed by: EMERGENCY MEDICINE

## 2025-04-08 PROCEDURE — 81001 URINALYSIS AUTO W/SCOPE: CPT

## 2025-04-08 PROCEDURE — 99285 EMERGENCY DEPT VISIT HI MDM: CPT

## 2025-04-08 PROCEDURE — 36415 COLL VENOUS BLD VENIPUNCTURE: CPT

## 2025-04-08 PROCEDURE — 85025 COMPLETE CBC W/AUTO DIFF WBC: CPT

## 2025-04-08 PROCEDURE — 80048 BASIC METABOLIC PNL TOTAL CA: CPT

## 2025-04-08 PROCEDURE — 84484 ASSAY OF TROPONIN QUANT: CPT

## 2025-04-08 ASSESSMENT — PAIN - FUNCTIONAL ASSESSMENT: PAIN_FUNCTIONAL_ASSESSMENT: NONE - DENIES PAIN

## 2025-04-08 ASSESSMENT — ENCOUNTER SYMPTOMS
COLOR CHANGE: 0
COUGH: 0
VOMITING: 0
NAUSEA: 0
BLOOD IN STOOL: 0
ABDOMINAL PAIN: 0
SHORTNESS OF BREATH: 0
RHINORRHEA: 0
SORE THROAT: 0

## 2025-04-08 NOTE — ED PROVIDER NOTES
THE [facility]  EMERGENCY DEPARTMENT ENCOUNTER          PHYSICIAN ASSISTANT NOTE       Date of evaluation: 4/8/2025    Chief Complaint     Dizziness      History of Present Illness     Fozia Boland is a 74 y.o. female, with a history of hypertension, hyperlipidemia, diabetes and coronary artery disease, who presents to the ED with complaints of what she describes as feeling \"offkilter\" that started last night while she was watching TV approximately 12 hours prior to arrival.  Her symptoms persisted this morning prompting her evaluation.  She denies vertiginous type symptoms states it feels more like lightheadedness and she is concerned she may be anemic.  She has been anemic in the past and took iron however discontinued that after she received no more prescriptions once treated for a GI bleed.  She denies fever, chills, nausea and vomiting.  Has had no abdominal pain and changes in urinary or bowel habits, specifically no black or bloody stools.  No chest pain, shortness of breath or leg swelling.  No cough or other URI type symptoms she otherwise has no complaints.    ASSESSMENT / PLAN  (MEDICAL DECISION MAKING)     INITIAL VITALS: BP: (!) 159/71, Temp: 97.9 °F (36.6 °C), Pulse: 79, Respirations: 18, SpO2: 97 %    Fozia Boland is a 74 y.o. female presenting with complaints of lightheadedness with no apparent triggers.  States she feels \"offkilter\" but with no other complaints.  Vital signs are stable, her exam is unremarkable with no neurologic deficits.  She is not orthostatic.    Labs include a CBC with an H&H of 11.4 and 34.3, stable compared to 10 and 32 approximately 4 months ago.  BMP results a BUN of 48, creatinine 3.1, also stable compared to 43 and 3.2 approximately 6 months ago.  Troponin is 61-->56, stable compared to troponins in September 2024.  Urinalysis results 100 protein, it is otherwise unremarkable.    Chest x-ray shows cardiomegaly with mild pulmonary vascular prominence which 
(N/A, 11/19/2022); Upper gastrointestinal endoscopy (N/A, 08/22/2024); and Hysterectomy.    family history is not on file.    She reports that she has never smoked. She has never used smokeless tobacco. She reports that she does not drink alcohol and does not use drugs.    Pertinent Physical Exam Findings:   Overall very well-appearing patient, pleasantly conversational, in no acute distress.  She is able to ambulate in the emergency department with a steady gait.          Kandi Deluna MD  04/08/25 2061

## 2025-04-08 NOTE — ED TRIAGE NOTES
Pt arrives in the ED c/o dizziness starting last night. States she has a Hx of low hemoglobin, unable to tell me why. Denies any pain, CP or SOB.

## 2025-07-24 RX ORDER — EVOLOCUMAB 140 MG/ML
INJECTION, SOLUTION SUBCUTANEOUS
Qty: 6 ADJUSTABLE DOSE PRE-FILLED PEN SYRINGE | Refills: 3 | Status: SHIPPED | OUTPATIENT
Start: 2025-07-24

## 2025-08-23 ENCOUNTER — HOSPITAL ENCOUNTER (EMERGENCY)
Age: 75
Discharge: HOME | End: 2025-08-23
Attending: STUDENT IN AN ORGANIZED HEALTH CARE EDUCATION/TRAINING PROGRAM
Payer: MEDICARE

## 2025-08-23 ENCOUNTER — APPOINTMENT (OUTPATIENT)
Dept: GENERAL RADIOLOGY | Age: 75
End: 2025-08-23
Payer: MEDICARE

## 2025-08-23 VITALS
TEMPERATURE: 98.2 F | DIASTOLIC BLOOD PRESSURE: 68 MMHG | HEART RATE: 61 BPM | BODY MASS INDEX: 42.53 KG/M2 | HEIGHT: 60 IN | SYSTOLIC BLOOD PRESSURE: 148 MMHG | WEIGHT: 216.6 LBS | OXYGEN SATURATION: 99 % | RESPIRATION RATE: 18 BRPM

## 2025-08-23 DIAGNOSIS — R07.9 CHEST PAIN, UNSPECIFIED TYPE: Primary | ICD-10-CM

## 2025-08-23 LAB
ANION GAP SERPL CALCULATED.3IONS-SCNC: 13 MMOL/L (ref 3–16)
BASOPHILS # BLD: 0 K/UL (ref 0–0.2)
BASOPHILS NFR BLD: 0.5 %
BUN SERPL-MCNC: 51 MG/DL (ref 7–20)
CALCIUM SERPL-MCNC: 9.8 MG/DL (ref 8.3–10.6)
CHLORIDE SERPL-SCNC: 102 MMOL/L (ref 99–110)
CO2 SERPL-SCNC: 21 MMOL/L (ref 21–32)
CREAT SERPL-MCNC: 3.3 MG/DL (ref 0.6–1.2)
DEPRECATED RDW RBC AUTO: 16.6 % (ref 12.4–15.4)
EKG ATRIAL RATE: 71 BPM
EKG DIAGNOSIS: NORMAL
EKG P AXIS: 46 DEGREES
EKG P-R INTERVAL: 174 MS
EKG Q-T INTERVAL: 416 MS
EKG QRS DURATION: 82 MS
EKG QTC CALCULATION (BAZETT): 452 MS
EKG R AXIS: 0 DEGREES
EKG T AXIS: 46 DEGREES
EKG VENTRICULAR RATE: 71 BPM
EOSINOPHIL # BLD: 0.1 K/UL (ref 0–0.6)
EOSINOPHIL NFR BLD: 1.6 %
GFR SERPLBLD CREATININE-BSD FMLA CKD-EPI: 14 ML/MIN/{1.73_M2}
GLUCOSE SERPL-MCNC: 113 MG/DL (ref 70–99)
HCT VFR BLD AUTO: 35.3 % (ref 36–48)
HGB BLD-MCNC: 11.9 G/DL (ref 12–16)
LYMPHOCYTES # BLD: 1.1 K/UL (ref 1–5.1)
LYMPHOCYTES NFR BLD: 21.7 %
MCH RBC QN AUTO: 30.4 PG (ref 26–34)
MCHC RBC AUTO-ENTMCNC: 33.8 G/DL (ref 31–36)
MCV RBC AUTO: 90.1 FL (ref 80–100)
MONOCYTES # BLD: 0.5 K/UL (ref 0–1.3)
MONOCYTES NFR BLD: 10.4 %
NEUTROPHILS # BLD: 3.4 K/UL (ref 1.7–7.7)
NEUTROPHILS NFR BLD: 65.8 %
NT-PROBNP SERPL-MCNC: 282 PG/ML (ref 0–449)
PLATELET # BLD AUTO: 299 K/UL (ref 135–450)
PMV BLD AUTO: 7 FL (ref 5–10.5)
POTASSIUM SERPL-SCNC: 5 MMOL/L (ref 3.5–5.1)
RBC # BLD AUTO: 3.92 M/UL (ref 4–5.2)
REASON FOR REJECTION: NORMAL
REJECTED TEST: NORMAL
SODIUM SERPL-SCNC: 136 MMOL/L (ref 136–145)
TROPONIN, HIGH SENSITIVITY: 38 NG/L (ref 0–14)
TROPONIN, HIGH SENSITIVITY: 53 NG/L (ref 0–14)
WBC # BLD AUTO: 5.2 K/UL (ref 4–11)

## 2025-08-23 PROCEDURE — 93005 ELECTROCARDIOGRAM TRACING: CPT | Performed by: STUDENT IN AN ORGANIZED HEALTH CARE EDUCATION/TRAINING PROGRAM

## 2025-08-23 PROCEDURE — 71046 X-RAY EXAM CHEST 2 VIEWS: CPT

## 2025-08-23 PROCEDURE — 99285 EMERGENCY DEPT VISIT HI MDM: CPT

## 2025-08-23 PROCEDURE — 83880 ASSAY OF NATRIURETIC PEPTIDE: CPT

## 2025-08-23 PROCEDURE — 84484 ASSAY OF TROPONIN QUANT: CPT

## 2025-08-23 PROCEDURE — 80048 BASIC METABOLIC PNL TOTAL CA: CPT

## 2025-08-23 PROCEDURE — 85025 COMPLETE CBC W/AUTO DIFF WBC: CPT

## 2025-08-23 ASSESSMENT — ENCOUNTER SYMPTOMS
CHEST TIGHTNESS: 0
ABDOMINAL PAIN: 0
VOMITING: 0
NAUSEA: 0
SHORTNESS OF BREATH: 0

## 2025-08-23 ASSESSMENT — PAIN SCALES - GENERAL: PAINLEVEL_OUTOF10: 0

## (undated) DEVICE — CANNULA SAMP CO2 AD GRN 7FT CO2 AND 7FT O2 TBNG UNIV CONN

## (undated) DEVICE — FORCEPS BX L240CM JAW DIA2.8MM L CAP W/ NDL MIC MESH TOOTH

## (undated) DEVICE — NEEDLE INJ ARTC 2.5MMX230CM

## (undated) DEVICE — CLIPPING DEVICE: Brand: RESOLUTION CLIP